# Patient Record
Sex: MALE | Race: WHITE | NOT HISPANIC OR LATINO | Employment: UNEMPLOYED | URBAN - METROPOLITAN AREA
[De-identification: names, ages, dates, MRNs, and addresses within clinical notes are randomized per-mention and may not be internally consistent; named-entity substitution may affect disease eponyms.]

---

## 2017-07-14 ENCOUNTER — RESOLUTE PROFESSIONAL BILLING HOSPITAL PROF FEE (OUTPATIENT)
Dept: HOSPITALIST | Facility: MEDICAL CENTER | Age: 32
End: 2017-07-14
Payer: MEDICAID

## 2017-07-14 ENCOUNTER — HOSPITAL ENCOUNTER (INPATIENT)
Facility: MEDICAL CENTER | Age: 32
LOS: 4 days | DRG: 638 | End: 2017-07-18
Attending: EMERGENCY MEDICINE | Admitting: HOSPITALIST
Payer: MEDICAID

## 2017-07-14 PROBLEM — E87.1 HYPONATREMIA: Status: ACTIVE | Noted: 2017-07-14

## 2017-07-14 PROBLEM — R73.9 HYPERGLYCEMIA: Status: ACTIVE | Noted: 2017-07-14

## 2017-07-14 PROBLEM — R59.9 LYMPH NODE ENLARGEMENT: Status: ACTIVE | Noted: 2017-07-14

## 2017-07-14 LAB
ALBUMIN SERPL BCP-MCNC: 3.8 G/DL (ref 3.2–4.9)
ALBUMIN/GLOB SERPL: 1.3 G/DL
ALP SERPL-CCNC: 126 U/L (ref 30–99)
ALT SERPL-CCNC: 20 U/L (ref 2–50)
ANION GAP SERPL CALC-SCNC: 11 MMOL/L (ref 0–11.9)
APPEARANCE UR: CLEAR
AST SERPL-CCNC: 15 U/L (ref 12–45)
B-OH-BUTYR SERPL-MCNC: 0.27 MMOL/L (ref 0.02–0.27)
BASOPHILS # BLD AUTO: 0.3 % (ref 0–1.8)
BASOPHILS # BLD: 0.02 K/UL (ref 0–0.12)
BILIRUB SERPL-MCNC: 0.6 MG/DL (ref 0.1–1.5)
BILIRUB UR QL STRIP.AUTO: NEGATIVE
BUN SERPL-MCNC: 8 MG/DL (ref 8–22)
CALCIUM SERPL-MCNC: 9.3 MG/DL (ref 8.5–10.5)
CHLORIDE SERPL-SCNC: 92 MMOL/L (ref 96–112)
CO2 SERPL-SCNC: 24 MMOL/L (ref 20–33)
COLOR UR: YELLOW
CREAT SERPL-MCNC: 0.71 MG/DL (ref 0.5–1.4)
CULTURE IF INDICATED INDCX: NO UA CULTURE
DEPRECATED S PYO AG THROAT QL EIA: NORMAL
EOSINOPHIL # BLD AUTO: 0.09 K/UL (ref 0–0.51)
EOSINOPHIL NFR BLD: 1.3 % (ref 0–6.9)
ERYTHROCYTE [DISTWIDTH] IN BLOOD BY AUTOMATED COUNT: 40.2 FL (ref 35.9–50)
EST. AVERAGE GLUCOSE BLD GHB EST-MCNC: 384 MG/DL
GFR SERPL CREATININE-BSD FRML MDRD: >60 ML/MIN/1.73 M 2
GLOBULIN SER CALC-MCNC: 3 G/DL (ref 1.9–3.5)
GLUCOSE BLD-MCNC: 472 MG/DL (ref 65–99)
GLUCOSE BLD-MCNC: 525 MG/DL (ref 65–99)
GLUCOSE BLD-MCNC: >600 MG/DL (ref 65–99)
GLUCOSE SERPL-MCNC: 682 MG/DL (ref 65–99)
GLUCOSE UR STRIP.AUTO-MCNC: >=1000 MG/DL
HBA1C MFR BLD: 15 % (ref 0–5.6)
HCT VFR BLD AUTO: 40 % (ref 42–52)
HETEROPH AB SER QL: NEGATIVE
HGB BLD-MCNC: 13.9 G/DL (ref 14–18)
IMM GRANULOCYTES # BLD AUTO: 0.03 K/UL (ref 0–0.11)
IMM GRANULOCYTES NFR BLD AUTO: 0.4 % (ref 0–0.9)
KETONES UR STRIP.AUTO-MCNC: NEGATIVE MG/DL
LEUKOCYTE ESTERASE UR QL STRIP.AUTO: NEGATIVE
LYMPHOCYTES # BLD AUTO: 1.54 K/UL (ref 1–4.8)
LYMPHOCYTES NFR BLD: 22.2 % (ref 22–41)
MCH RBC QN AUTO: 30.4 PG (ref 27–33)
MCHC RBC AUTO-ENTMCNC: 34.8 G/DL (ref 33.7–35.3)
MCV RBC AUTO: 87.5 FL (ref 81.4–97.8)
MICRO URNS: ABNORMAL
MONOCYTES # BLD AUTO: 0.55 K/UL (ref 0–0.85)
MONOCYTES NFR BLD AUTO: 7.9 % (ref 0–13.4)
NEUTROPHILS # BLD AUTO: 4.72 K/UL (ref 1.82–7.42)
NEUTROPHILS NFR BLD: 67.9 % (ref 44–72)
NITRITE UR QL STRIP.AUTO: NEGATIVE
NRBC # BLD AUTO: 0 K/UL
NRBC BLD AUTO-RTO: 0 /100 WBC
PH UR STRIP.AUTO: 8 [PH]
PLATELET # BLD AUTO: 227 K/UL (ref 164–446)
PMV BLD AUTO: 11.3 FL (ref 9–12.9)
POTASSIUM SERPL-SCNC: 4.4 MMOL/L (ref 3.6–5.5)
PROT SERPL-MCNC: 6.8 G/DL (ref 6–8.2)
PROT UR QL STRIP: NEGATIVE MG/DL
RBC # BLD AUTO: 4.57 M/UL (ref 4.7–6.1)
RBC UR QL AUTO: NEGATIVE
SIGNIFICANT IND 70042: NORMAL
SITE SITE: NORMAL
SODIUM SERPL-SCNC: 127 MMOL/L (ref 135–145)
SOURCE SOURCE: NORMAL
SP GR UR STRIP.AUTO: 1.04
UROBILINOGEN UR STRIP.AUTO-MCNC: 0.2 MG/DL
WBC # BLD AUTO: 7 K/UL (ref 4.8–10.8)

## 2017-07-14 PROCEDURE — 700102 HCHG RX REV CODE 250 W/ 637 OVERRIDE(OP): Performed by: HOSPITALIST

## 2017-07-14 PROCEDURE — 82010 KETONE BODYS QUAN: CPT

## 2017-07-14 PROCEDURE — 96372 THER/PROPH/DIAG INJ SC/IM: CPT

## 2017-07-14 PROCEDURE — 87081 CULTURE SCREEN ONLY: CPT

## 2017-07-14 PROCEDURE — 36415 COLL VENOUS BLD VENIPUNCTURE: CPT

## 2017-07-14 PROCEDURE — 700105 HCHG RX REV CODE 258: Performed by: EMERGENCY MEDICINE

## 2017-07-14 PROCEDURE — 86308 HETEROPHILE ANTIBODY SCREEN: CPT

## 2017-07-14 PROCEDURE — 99285 EMERGENCY DEPT VISIT HI MDM: CPT

## 2017-07-14 PROCEDURE — 99406 BEHAV CHNG SMOKING 3-10 MIN: CPT | Performed by: HOSPITALIST

## 2017-07-14 PROCEDURE — 96361 HYDRATE IV INFUSION ADD-ON: CPT

## 2017-07-14 PROCEDURE — 82962 GLUCOSE BLOOD TEST: CPT | Mod: 91

## 2017-07-14 PROCEDURE — 83036 HEMOGLOBIN GLYCOSYLATED A1C: CPT

## 2017-07-14 PROCEDURE — 85025 COMPLETE CBC W/AUTO DIFF WBC: CPT

## 2017-07-14 PROCEDURE — 770006 HCHG ROOM/CARE - MED/SURG/GYN SEMI*

## 2017-07-14 PROCEDURE — 99223 1ST HOSP IP/OBS HIGH 75: CPT | Mod: 25 | Performed by: HOSPITALIST

## 2017-07-14 PROCEDURE — 80053 COMPREHEN METABOLIC PANEL: CPT

## 2017-07-14 PROCEDURE — 96360 HYDRATION IV INFUSION INIT: CPT

## 2017-07-14 PROCEDURE — 700105 HCHG RX REV CODE 258: Performed by: HOSPITALIST

## 2017-07-14 PROCEDURE — 87880 STREP A ASSAY W/OPTIC: CPT

## 2017-07-14 PROCEDURE — 81003 URINALYSIS AUTO W/O SCOPE: CPT

## 2017-07-14 RX ORDER — HEPARIN SODIUM 5000 [USP'U]/ML
5000 INJECTION, SOLUTION INTRAVENOUS; SUBCUTANEOUS EVERY 8 HOURS
Status: DISCONTINUED | OUTPATIENT
Start: 2017-07-15 | End: 2017-07-18 | Stop reason: HOSPADM

## 2017-07-14 RX ORDER — DEXTROSE MONOHYDRATE 25 G/50ML
25 INJECTION, SOLUTION INTRAVENOUS
Status: DISCONTINUED | OUTPATIENT
Start: 2017-07-14 | End: 2017-07-18 | Stop reason: HOSPADM

## 2017-07-14 RX ORDER — ONDANSETRON 4 MG/1
4 TABLET, ORALLY DISINTEGRATING ORAL EVERY 4 HOURS PRN
Status: DISCONTINUED | OUTPATIENT
Start: 2017-07-14 | End: 2017-07-18 | Stop reason: HOSPADM

## 2017-07-14 RX ORDER — NICOTINE 21 MG/24HR
21 PATCH, TRANSDERMAL 24 HOURS TRANSDERMAL
Status: DISCONTINUED | OUTPATIENT
Start: 2017-07-15 | End: 2017-07-18 | Stop reason: HOSPADM

## 2017-07-14 RX ORDER — PROMETHAZINE HYDROCHLORIDE 25 MG/1
12.5-25 SUPPOSITORY RECTAL EVERY 4 HOURS PRN
Status: DISCONTINUED | OUTPATIENT
Start: 2017-07-14 | End: 2017-07-18 | Stop reason: HOSPADM

## 2017-07-14 RX ORDER — PROMETHAZINE HYDROCHLORIDE 25 MG/1
12.5-25 TABLET ORAL EVERY 4 HOURS PRN
Status: DISCONTINUED | OUTPATIENT
Start: 2017-07-14 | End: 2017-07-18 | Stop reason: HOSPADM

## 2017-07-14 RX ORDER — SODIUM CHLORIDE 9 MG/ML
1000 INJECTION, SOLUTION INTRAVENOUS ONCE
Status: COMPLETED | OUTPATIENT
Start: 2017-07-14 | End: 2017-07-14

## 2017-07-14 RX ORDER — AMOXICILLIN 250 MG
2 CAPSULE ORAL 2 TIMES DAILY
Status: DISCONTINUED | OUTPATIENT
Start: 2017-07-15 | End: 2017-07-18 | Stop reason: HOSPADM

## 2017-07-14 RX ORDER — SODIUM CHLORIDE 9 MG/ML
INJECTION, SOLUTION INTRAVENOUS CONTINUOUS
Status: DISCONTINUED | OUTPATIENT
Start: 2017-07-14 | End: 2017-07-15

## 2017-07-14 RX ORDER — ONDANSETRON 2 MG/ML
4 INJECTION INTRAMUSCULAR; INTRAVENOUS EVERY 4 HOURS PRN
Status: DISCONTINUED | OUTPATIENT
Start: 2017-07-14 | End: 2017-07-18 | Stop reason: HOSPADM

## 2017-07-14 RX ORDER — POLYETHYLENE GLYCOL 3350 17 G/17G
1 POWDER, FOR SOLUTION ORAL
Status: DISCONTINUED | OUTPATIENT
Start: 2017-07-14 | End: 2017-07-18 | Stop reason: HOSPADM

## 2017-07-14 RX ORDER — BISACODYL 10 MG
10 SUPPOSITORY, RECTAL RECTAL
Status: DISCONTINUED | OUTPATIENT
Start: 2017-07-14 | End: 2017-07-18 | Stop reason: HOSPADM

## 2017-07-14 RX ADMIN — INSULIN LISPRO 14 UNITS: 100 INJECTION, SOLUTION INTRAVENOUS; SUBCUTANEOUS at 21:07

## 2017-07-14 RX ADMIN — SODIUM CHLORIDE: 9 INJECTION, SOLUTION INTRAVENOUS at 20:46

## 2017-07-14 RX ADMIN — INSULIN HUMAN 10 UNITS: 100 INJECTION, SUSPENSION SUBCUTANEOUS at 18:42

## 2017-07-14 RX ADMIN — SODIUM CHLORIDE 1000 ML: 9 INJECTION, SOLUTION INTRAVENOUS at 17:45

## 2017-07-14 RX ADMIN — SODIUM CHLORIDE 1000 ML: 9 INJECTION, SOLUTION INTRAVENOUS at 17:10

## 2017-07-14 ASSESSMENT — PAIN SCALES - GENERAL
PAINLEVEL_OUTOF10: 0
PAINLEVEL_OUTOF10: 0

## 2017-07-14 ASSESSMENT — PATIENT HEALTH QUESTIONNAIRE - PHQ9
2. FEELING DOWN, DEPRESSED, IRRITABLE, OR HOPELESS: NOT AT ALL
SUM OF ALL RESPONSES TO PHQ QUESTIONS 1-9: 0
1. LITTLE INTEREST OR PLEASURE IN DOING THINGS: NOT AT ALL
SUM OF ALL RESPONSES TO PHQ9 QUESTIONS 1 AND 2: 0

## 2017-07-14 ASSESSMENT — LIFESTYLE VARIABLES: DO YOU DRINK ALCOHOL: NO

## 2017-07-14 NOTE — IP AVS SNAPSHOT
Novitas Access Code: [email or web address]  Expires: 8/17/2017  1:41 PM    Your email address is not on file at [facility].  Email Addresses are required for you to sign up for Novitas, please contact [phone] to verify your personal information and to provide your email address prior to attempting to register for Novitas.    Gerber Langston  [address] [place] [address] [address] [place] [id number]    Novitas  A secure, online tool to manage your health information     [facility]’s Novitas® is a secure, online tool that connects you to your personalized health information from the privacy of your home -- day or night - making it very easy for you to manage your healthcare. Once the activation process is completed, you can even access your medical information using the Novitas rosemary, which is available for free in the Apple Rosemary store or Google Play store.     To learn more about Novitas, visit [email or web address]    There are two levels of access available (as shown below):   My Chart Features  [facility] Primary Care Doctor [facility]  Specialists [facility]  Urgent  Care Non-[facility] Primary Care Doctor   Email your healthcare team securely and privately 24/7 X X X    Manage appointments: schedule your next appointment; view details of past/upcoming appointments X      Request prescription refills. X      View recent personal medical records, including lab and immunizations X X X X   View health record, including health history, allergies, medications X X X X   Read reports about your outpatient visits, procedures, consult and ER notes X X X X   See your discharge summary, which is a recap of your hospital and/or ER visit that includes your diagnosis, lab results, and care plan X X  X     How to register for WhatSalont:  Once your e-mail address has been verified, follow the following steps to sign up for WhatSalont.     1. Go to  [email or web address]  2. Click on the Sign Up Now box, which takes you to the New Member Sign Up page. You  will need to provide the following information:  a. Enter your JAZIO Access Code exactly as it appears at the top of this page. (You will not need to use this code after you’ve completed the sign-up process. If you do not sign up before the expiration date, you must request a new code.)   b. Enter your date of birth.   c. Enter your home email address.   d. Click Submit, and follow the next screen’s instructions.  3. Create a Caring.comt ID. This will be your JAZIO login ID and cannot be changed, so think of one that is secure and easy to remember.  4. Create a JAZIO password. You can change your password at any time.  5. Enter your Password Reset Question and Answer. This can be used at a later time if you forget your password.   6. Enter your e-mail address. This allows you to receive e-mail notifications when new information is available in JAZIO.  7. Click Sign Up. You can now view your health information.    For assistance activating your JAZIO account, call [phone]

## 2017-07-14 NOTE — ED PROVIDER NOTES
ED Provider Note    CHIEF COMPLAINT  Chief Complaint   Patient presents with   • Sore Throat       HPI  Gerber Langston is a 31 y.o. male who presents for evaluation of sore throat pain and a lymph node on his right neck. Patient also reports he has not been feeling well over the past several months with weight loss polydipsia polyphagia increased thirst. He has no stated or significant medical or surgical history. He reports mild sore throat but no muffled voice no high fevers or chills    REVIEW OF SYSTEMS  See HPI for further details. Positive for general malaise polydipsia polyuria All other systems are negative.     PAST MEDICAL HISTORY  History reviewed. No pertinent past medical history.  No stated medical history  FAMILY HISTORY  None reported    SOCIAL HISTORY  Social History     Social History   • Marital Status: Single     Spouse Name: N/A   • Number of Children: N/A   • Years of Education: N/A     Social History Main Topics   • Smoking status: Current Every Day Smoker -- 0.50 packs/day     Types: Cigarettes   • Smokeless tobacco: None   • Alcohol Use: Yes      Comment: socially   • Drug Use: No   • Sexual Activity: Not Asked     Other Topics Concern   • None     Social History Narrative     Smoke cigarettes denies IV drugs  SURGICAL HISTORY  Past Surgical History   Procedure Laterality Date   • Hernia repair Left 4/2016     inguinal       CURRENT MEDICATIONS  Home Medications     Reviewed by Anna Cortez, Pharmacy Int (Pharmacy Intern) on 07/14/17 at 1721  Med List Status: Complete    Medication Last Dose Status          Patient Ajay Taking any Medications                      No regular medications  ALLERGIES  No Known Allergies    PHYSICAL EXAM  VITAL SIGNS: /72 mmHg  Pulse 82  Temp(Src) 36.9 °C (98.4 °F)  Resp 16  Wt 56.2 kg (123 lb 14.4 oz)  SpO2 98% Room air O2: 98    Constitutional: Patient appears slightly cachectic   HEENT. Ear nose normal exam. Throat is erythematous no exudates  normal oral exudates, Nose normal. Dry mucous membranes  Eyes: PERRLA, EOMI, Conjunctiva normal, No discharge.   Neck: Normal range of motion, No tenderness, there appears to be a isolated right enlarged lymph node in the anterior cervical chain no abscess  Cardiovascular: Normal heart rate, Normal rhythm, No murmurs, No rubs, No gallops.   Thorax & Lungs: Normal breath sounds, No respiratory distress, No wheezing, No chest tenderness.   Abdomen: Bowel sounds normal, Soft, No tenderness, No masses, No pulsatile masses.   Skin: Warm, Dry, No erythema, No rash.   Back: No tenderness, No CVA tenderness.   Extremities: Intact distal pulses, No edema, No tenderness, No cyanosis, No clubbing.   Musculoskeletal: Good range of motion in all major joints. No tenderness to palpation or major deformities noted.   Neurologic: Alert & oriented x 3, Normal motor function, Normal sensory function, No focal deficits noted.   Psychiatric: Anxious      RADIOLOGY/PROCEDURES  Results for orders placed or performed during the hospital encounter of 07/14/17   CBC WITH DIFFERENTIAL   Result Value Ref Range    WBC 7.0 4.8 - 10.8 K/uL    RBC 4.57 (L) 4.70 - 6.10 M/uL    Hemoglobin 13.9 (L) 14.0 - 18.0 g/dL    Hematocrit 40.0 (L) 42.0 - 52.0 %    MCV 87.5 81.4 - 97.8 fL    MCH 30.4 27.0 - 33.0 pg    MCHC 34.8 33.7 - 35.3 g/dL    RDW 40.2 35.9 - 50.0 fL    Platelet Count 227 164 - 446 K/uL    MPV 11.3 9.0 - 12.9 fL    Neutrophils-Polys 67.90 44.00 - 72.00 %    Lymphocytes 22.20 22.00 - 41.00 %    Monocytes 7.90 0.00 - 13.40 %    Eosinophils 1.30 0.00 - 6.90 %    Basophils 0.30 0.00 - 1.80 %    Immature Granulocytes 0.40 0.00 - 0.90 %    Nucleated RBC 0.00 /100 WBC    Neutrophils (Absolute) 4.72 1.82 - 7.42 K/uL    Lymphs (Absolute) 1.54 1.00 - 4.80 K/uL    Monos (Absolute) 0.55 0.00 - 0.85 K/uL    Eos (Absolute) 0.09 0.00 - 0.51 K/uL    Baso (Absolute) 0.02 0.00 - 0.12 K/uL    Immature Granulocytes (abs) 0.03 0.00 - 0.11 K/uL    NRBC  (Absolute) 0.00 K/uL   COMP METABOLIC PANEL   Result Value Ref Range    Sodium 127 (L) 135 - 145 mmol/L    Potassium 4.4 3.6 - 5.5 mmol/L    Chloride 92 (L) 96 - 112 mmol/L    Co2 24 20 - 33 mmol/L    Anion Gap 11.0 0.0 - 11.9    Glucose 682 (HH) 65 - 99 mg/dL    Bun 8 8 - 22 mg/dL    Creatinine 0.71 0.50 - 1.40 mg/dL    Calcium 9.3 8.5 - 10.5 mg/dL    AST(SGOT) 15 12 - 45 U/L    ALT(SGPT) 20 2 - 50 U/L    Alkaline Phosphatase 126 (H) 30 - 99 U/L    Total Bilirubin 0.6 0.1 - 1.5 mg/dL    Albumin 3.8 3.2 - 4.9 g/dL    Total Protein 6.8 6.0 - 8.2 g/dL    Globulin 3.0 1.9 - 3.5 g/dL    A-G Ratio 1.3 g/dL   MONONUCLEOSIS TEST QUAL   Result Value Ref Range    Heterophile Screen Negative Negative   RAPID STREP, CULT IF INDICATED (CULTURE IF NEGATIVE)   Result Value Ref Range    Significant Indicator NEG     Source THRT     Site THROAT     Rapid Strep Screen       Negative for Group A streptococcus.  A negative result may be obtained if the specimen is  inadequate or antigen concentration is below the  sensitivity of the test. This negative test will be followed  up with a culture as requested.     ESTIMATED GFR   Result Value Ref Range    GFR If African American >60 >60 mL/min/1.73 m 2    GFR If Non African American >60 >60 mL/min/1.73 m 2   ACCU-CHEK GLUCOSE   Result Value Ref Range    Glucose - Accu-Ck >600 (HH) 65 - 99 mg/dL         COURSE & MEDICAL DECISION MAKING  Pertinent Labs & Imaging studies reviewed. (See chart for details)  Patient presented here with several constitutional symptoms as well as a enlarged lymph node isolated on the right side in the anterior cervical chain. I performed extensive laboratory studies and his rapid strep, Monospot were negative and his CBC was unremarkable however surprisingly his blood sugar was greater than 600 without evidence of acidosis. He has no evidence of hyperglycemia or diabetes. We rechecked this with an Accu-Chek to be sure that it was not a laboratory air and it was  reading critically high. Given the fact the patient is a new diabetic with profound hyperglycemia luckily he does not have any evidence of acidosis. I administered 2 L of crystalloid he'll be admitted to internal medicine for treatment and workup of new-onset diabetes    FINAL IMPRESSION  1. New onset diabetes without acidosis  2. Right anterior cervical chain lymphadenopathy    Admission         Electronically signed by: Sidney Friedman, 7/14/2017 3:17 PM

## 2017-07-14 NOTE — IP AVS SNAPSHOT
" <p align=\"LEFT\"><IMG SRC=\"//[email or web address]\" alt=\"Image\" WIDTH=\"50%\" HEIGHT=\"200\" BORDER=\"\"></p>                   Name:Gerber Langston  Medical Record Number:[id number]  CSN: [id number]    YOB: 1985   Age: 31 y.o.  Sex: male  HT:1.626 m (5' 4\") WT: 58.9 kg (129 lb 13.6 oz)          Admit Date: 7/14/2017     Discharge Date:   Today's Date: 7/18/2017  Attending Doctor:  Bashir Miner M.D.                  Allergies:  Review of patient's allergies indicates no known allergies.          Your appointments     Aug 02, 2017 10:00 AM   New Patient with Donald Weber M.D.   [facility] Med - Internal Medicine (--)    [address] [place] [id number] [phone]           Please bring Photo ID, Insurance Cards, All Medication Bottles and copies of any legal documents (such as Living Will, Power of ) If speaking a language besides English please bring an adult . Please arrive 30 minutes prior for check in and registration. You will be receiving a confirmation call a few days before your appointment from our automated call confirmation system.                 Medication List      Take these Medications        Instructions    insulin glargine 100 UNIT/ML Soln   Commonly known as:  LANTUS    Inject 30 Units as instructed every evening.   Dose:  30 Units       insulin lispro 100 UNIT/ML Soln   Commonly known as:  HUMALOG    Inject 3-14 Units as instructed 4 Times a Day,Before Meals and at Bedtime.   Dose:  3-14 Units         "

## 2017-07-14 NOTE — ED NOTES
Jesse from Lab called with critical result of Glucose of 682 at 1653. Critical lab result read back to Jesse.   Dr. Gamez notified of critical lab result at 1653.  Critical lab result read back by Dr. Chandler.

## 2017-07-14 NOTE — IP AVS SNAPSHOT
7/18/2017    Gerber Langston  [address]  [place] [address]    Dear Gerber:    [facility] wants to ensure your discharge home is safe and you or your loved ones have had all of your questions answered regarding your care after you leave the hospital.    Below is a list of resources and contact information should you have any questions regarding your hospital stay, follow-up instructions, or active medical symptoms.    Questions or Concerns Regarding… Contact   Medical Questions Related to Your Discharge  (7 days a week, 8am-5pm) Contact a Nurse Care Coordinator   [phone]   Medical Questions Not Related to Your Discharge  (24 hours a day / 7 days a week)  Contact the Nurse Health Line   [phone]    Medications or Discharge Instructions Refer to your discharge packet   or contact your [facility] Primary Care Provider   [phone]   Follow-up Appointment(s) Schedule your appointment via [employer]   or contact Scheduling [phone]   Billing Review your statement via [employer]  or contact Billing [phone]   Medical Records Review your records via [employer]   or contact Medical Records [phone]     You may receive a telephone call within two days of discharge. This call is to make certain you understand your discharge instructions and have the opportunity to have any questions answered. You can also easily access your medical information, test results and upcoming appointments via the [employer] free online health management tool. You can learn more and sign up at [employer]. For assistance setting up your [employer] account, please call [phone].    Once again, we want to ensure your discharge home is safe and that you have a clear understanding of any next steps in your care. If you have any questions or concerns, please do not hesitate to contact us, we are here for you. Thank you for choosing [facility] for your healthcare needs.    Sincerely,    Your [facility] Healthcare Team

## 2017-07-14 NOTE — IP AVS SNAPSHOT
" Home Care Instructions                                                                                                                  Name:Gerber Langston  Medical Record Number:[id number]  CSN: [id number]    YOB: 1985   Age: 31 y.o.  Sex: male  HT:1.626 m (5' 4\") WT: 58.9 kg (129 lb 13.6 oz)          Admit Date: 7/14/2017     Discharge Date:   Today's Date: 7/18/2017  Attending Doctor:  Bashir Miner M.D.                  Allergies:  Review of patient's allergies indicates no known allergies.            Discharge Instructions       Discharge Instructions    Discharged to home by car with friend. Discharged via walking, hospital escort: Refused.  Special equipment needed: Not Applicable    Be sure to schedule a follow-up appointment with your primary care doctor or any specialists as instructed.     Discharge Plan:   Diet Plan: Discussed  Activity Level: Discussed  Confirmed Follow up Appointment: Appointment Scheduled  Confirmed Symptoms Management: Discussed  Medication Reconciliation Updated: Yes  Pneumococcal Vaccine Given - only chart on this line when given: Given (See MAR)  Influenza Vaccine Indication: Indicated: Not available from distributor/    I understand that a diet low in cholesterol, fat, and sodium is recommended for good health. Unless I have been given specific instructions below for another diet, I accept this instruction as my diet prescription.   Other diet: diabetic    Special Instructions: None    · Is patient discharged on Warfarin / Coumadin?   No     · Is patient Post Blood Transfusion?  No    Depression / Suicide Risk    As you are discharged from [facility], it is important to learn how to keep safe from harming yourself.    Recognize the warning signs:  · Abrupt changes in personality, positive or negative- including increase in energy   · Giving away possessions  · Change in eating patterns- significant weight changes-  positive or negative  · Change in " sleeping patterns- unable to sleep or sleeping all the time   · Unwillingness or inability to communicate  · Depression  · Unusual sadness, discouragement and loneliness  · Talk of wanting to die  · Neglect of personal appearance   · Rebelliousness- reckless behavior  · Withdrawal from people/activities they love  · Confusion- inability to concentrate     If you or a loved one observes any of these behaviors or has concerns about self-harm, here's what you can do:  · Talk about it- your feelings and reasons for harming yourself  · Remove any means that you might use to hurt yourself (examples: pills, rope, extension cords, firearm)  · Get professional help from the community (Mental Health, Substance Abuse, psychological counseling)  · Do not be alone:Call your Safe Contact- someone whom you trust who will be there for you.  · Call your local CRISIS HOTLINE 588-3116 or 606-244-2985  · Call your local Children's Mobile Crisis Response Team [place] (612) 209-8516 or www.Cmxtwenty  · Call the toll free National Suicide Prevention Hotlines   · National Suicide Prevention Lifeline 157-021-PUKZ (1240)  · National Hope Line Network 800-SUICIDE (703-5237)        Your appointments     Aug 02, 2017 10:00 AM   New Patient with Donald Weber M.D.   [facility] - Internal Medicine (--)    [address] [place] [id number] [phone]           Please bring Photo ID, Insurance Cards, All Medication Bottles and copies of any legal documents (such as Living Will, Power of ) If speaking a language besides English please bring an adult . Please arrive 30 minutes prior for check in and registration. You will be receiving a confirmation call a few days before your appointment from our automated call confirmation system.                 Discharge Medication Instructions:    Below are the medications your physician expects you to take upon discharge:    Review all  your home medications and newly ordered medications with your doctor and/or pharmacist. Follow medication instructions as directed by your doctor and/or pharmacist.    Please keep your medication list with you and share with your physician.               Medication List      START taking these medications        Instructions    Morning Afternoon Evening Bedtime    insulin glargine 100 UNIT/ML Soln   Last time this was given:  30 Units on 7/17/2017  9:40 PM   Commonly known as:  LANTUS        Inject 30 Units as instructed every evening.   Dose:  30 Units                        insulin lispro 100 UNIT/ML Soln   Last time this was given:  7 Units on 7/18/2017 11:28 AM   Commonly known as:  HUMALOG        Inject 3-14 Units as instructed 4 Times a Day,Before Meals and at Bedtime.   Dose:  3-14 Units                             Where to Get Your Medications      You can get these medications from any pharmacy     Bring a paper prescription for each of these medications    - insulin glargine 100 UNIT/ML Soln  - insulin lispro 100 UNIT/ML Soln            Instructions           Diet / Nutrition:    Follow any diet instructions given to you by your doctor or the dietician, including how much salt (sodium) you are allowed each day.    If you are overweight, talk to your doctor about a weight reduction plan.    Activity:    Remain physically active following your doctor's instructions about exercise and activity.    Rest often.     Any time you become even a little tired or short of breath, SIT DOWN and rest.    Worsening Symptoms:    Report any of the following signs and symptoms to the doctor's office immediately:    *Pain of jaw, arm, or neck  *Chest pain not relieved by medication                               *Dizziness or loss of consciousness  *Difficulty breathing even when at rest   *More tired than usual                                       *Bleeding drainage or swelling of surgical site  *Swelling of feet, ankles,  legs or stomach                 *Fever (>100ºF)  *Pink or blood tinged sputum  *Weight gain (3lbs/day or 5lbs /week)           *Shock from internal defibrillator (if applicable)  *Palpitations or irregular heartbeats                *Cool and/or numb extremities    Stroke Awareness    Common Risk Factors for Stroke include:    Age  Atrial Fibrillation  Carotid Artery Stenosis  Diabetes Mellitus  Excessive alcohol consumption  High blood pressure  Overweight   Physical inactivity  Smoking    Warning signs and symptoms of a stroke include:    *Sudden numbness or weakness of the face, arm or leg (especially on one side of the body).  *Sudden confusion, trouble speaking or understanding.  *Sudden trouble seeing in one or both eyes.  *Sudden trouble walking, dizziness, loss of balance or coordination.Sudden severe headache with no known cause.    It is very important to get treatment quickly when a stroke occurs. If you experience any of the above warning signs, call 911 immediately.                   Disclaimer         Quit Smoking / Tobacco Use:    I understand the use of any tobacco products increases my chance of suffering from future heart disease or stroke and could cause other illnesses which may shorten my life. Quitting the use of tobacco products is the single most important thing I can do to improve my health. For further information on smoking / tobacco cessation call a Toll Free Quit Line at 1-257.439.6395 (*National [employer] [place]) or 1-279.140.1247 (American Lung Association) or you can access the web based program at www.lungusa.org.    [place] Tobacco Users Help Line:  (602) 799-9816       Toll Free: 1-585.758.4089  Quit Tobacco Program [facility] (590)666-3139    Crisis Hotline:    [place] Crisis Hotline:  5-206-NFFKCAJ or 1-854.107.6476    [place] Crisis Hotline:    1-207.728.9656 or 235-859-2946    Discharge Survey:   Thank you for choosing [email or web address]. We hope we did  everything we could to make your hospital stay a pleasant one. You may be receiving a phone survey and we would appreciate your time and participation in answering the questions. Your input is very valuable to us in our efforts to improve our service to our patients and their families.        My signature on this form indicates that:    1. I have reviewed and understand the above information.  2. My questions regarding this information have been answered to my satisfaction.  3. I have formulated a plan with my discharge nurse to obtain my prescribed medications for home.                  Disclaimer         __________________________________                     __________       ________                       Patient Signature                                                 Date                    Time

## 2017-07-15 PROBLEM — E10.9 TYPE 1 DIABETES MELLITUS WITHOUT COMPLICATION (HCC): Status: ACTIVE | Noted: 2017-07-15

## 2017-07-15 LAB
ANION GAP SERPL CALC-SCNC: 3 MMOL/L (ref 0–11.9)
BUN SERPL-MCNC: 9 MG/DL (ref 8–22)
CALCIUM SERPL-MCNC: 8.6 MG/DL (ref 8.5–10.5)
CHLORIDE SERPL-SCNC: 106 MMOL/L (ref 96–112)
CO2 SERPL-SCNC: 25 MMOL/L (ref 20–33)
CREAT SERPL-MCNC: 0.57 MG/DL (ref 0.5–1.4)
GFR SERPL CREATININE-BSD FRML MDRD: >60 ML/MIN/1.73 M 2
GLUCOSE BLD-MCNC: 149 MG/DL (ref 65–99)
GLUCOSE BLD-MCNC: 200 MG/DL (ref 65–99)
GLUCOSE BLD-MCNC: 209 MG/DL (ref 65–99)
GLUCOSE BLD-MCNC: 305 MG/DL (ref 65–99)
GLUCOSE BLD-MCNC: 471 MG/DL (ref 65–99)
GLUCOSE SERPL-MCNC: 190 MG/DL (ref 65–99)
POTASSIUM SERPL-SCNC: 3.5 MMOL/L (ref 3.6–5.5)
SODIUM SERPL-SCNC: 134 MMOL/L (ref 135–145)

## 2017-07-15 PROCEDURE — 90732 PPSV23 VACC 2 YRS+ SUBQ/IM: CPT | Performed by: HOSPITALIST

## 2017-07-15 PROCEDURE — 80048 BASIC METABOLIC PNL TOTAL CA: CPT

## 2017-07-15 PROCEDURE — 700102 HCHG RX REV CODE 250 W/ 637 OVERRIDE(OP): Performed by: HOSPITALIST

## 2017-07-15 PROCEDURE — 700105 HCHG RX REV CODE 258: Performed by: HOSPITALIST

## 2017-07-15 PROCEDURE — 90471 IMMUNIZATION ADMIN: CPT

## 2017-07-15 PROCEDURE — 700102 HCHG RX REV CODE 250 W/ 637 OVERRIDE(OP): Performed by: INTERNAL MEDICINE

## 2017-07-15 PROCEDURE — 82962 GLUCOSE BLOOD TEST: CPT | Mod: 91

## 2017-07-15 PROCEDURE — A9270 NON-COVERED ITEM OR SERVICE: HCPCS | Performed by: INTERNAL MEDICINE

## 2017-07-15 PROCEDURE — 3E0234Z INTRODUCTION OF SERUM, TOXOID AND VACCINE INTO MUSCLE, PERCUTANEOUS APPROACH: ICD-10-PCS | Performed by: HOSPITALIST

## 2017-07-15 PROCEDURE — 700111 HCHG RX REV CODE 636 W/ 250 OVERRIDE (IP): Performed by: HOSPITALIST

## 2017-07-15 PROCEDURE — 770006 HCHG ROOM/CARE - MED/SURG/GYN SEMI*

## 2017-07-15 PROCEDURE — 99233 SBSQ HOSP IP/OBS HIGH 50: CPT | Performed by: INTERNAL MEDICINE

## 2017-07-15 PROCEDURE — A9270 NON-COVERED ITEM OR SERVICE: HCPCS | Performed by: HOSPITALIST

## 2017-07-15 PROCEDURE — 36415 COLL VENOUS BLD VENIPUNCTURE: CPT

## 2017-07-15 RX ORDER — POTASSIUM CHLORIDE 20 MEQ/1
40 TABLET, EXTENDED RELEASE ORAL ONCE
Status: COMPLETED | OUTPATIENT
Start: 2017-07-15 | End: 2017-07-15

## 2017-07-15 RX ADMIN — INSULIN LISPRO 10 UNITS: 100 INJECTION, SOLUTION INTRAVENOUS; SUBCUTANEOUS at 21:54

## 2017-07-15 RX ADMIN — STANDARDIZED SENNA CONCENTRATE AND DOCUSATE SODIUM 2 TABLET: 8.6; 5 TABLET, FILM COATED ORAL at 21:05

## 2017-07-15 RX ADMIN — INSULIN LISPRO 14 UNITS: 100 INJECTION, SOLUTION INTRAVENOUS; SUBCUTANEOUS at 17:54

## 2017-07-15 RX ADMIN — NICOTINE 21 MG: 21 PATCH, EXTENDED RELEASE TRANSDERMAL at 05:50

## 2017-07-15 RX ADMIN — INSULIN HUMAN 10 UNITS: 100 INJECTION, SUSPENSION SUBCUTANEOUS at 17:55

## 2017-07-15 RX ADMIN — PNEUMOCOCCAL VACCINE POLYVALENT 25 MCG
25; 25; 25; 25; 25; 25; 25; 25; 25; 25; 25; 25; 25; 25; 25; 25; 25; 25; 25; 25; 25; 25; 25 INJECTION, SOLUTION INTRAMUSCULAR; SUBCUTANEOUS at 05:50

## 2017-07-15 RX ADMIN — POTASSIUM CHLORIDE 40 MEQ: 1500 TABLET, EXTENDED RELEASE ORAL at 09:11

## 2017-07-15 RX ADMIN — INSULIN LISPRO 4 UNITS: 100 INJECTION, SOLUTION INTRAVENOUS; SUBCUTANEOUS at 06:32

## 2017-07-15 RX ADMIN — HEPARIN SODIUM 5000 UNITS: 5000 INJECTION, SOLUTION INTRAVENOUS; SUBCUTANEOUS at 21:49

## 2017-07-15 RX ADMIN — SODIUM CHLORIDE: 9 INJECTION, SOLUTION INTRAVENOUS at 12:35

## 2017-07-15 RX ADMIN — INSULIN HUMAN 10 UNITS: 100 INJECTION, SUSPENSION SUBCUTANEOUS at 06:45

## 2017-07-15 ASSESSMENT — ENCOUNTER SYMPTOMS
DEPRESSION: 0
EYE PAIN: 0
DIARRHEA: 0
ABDOMINAL PAIN: 0
NAUSEA: 0
HEADACHES: 0
SEIZURES: 0
SPUTUM PRODUCTION: 0
SPEECH CHANGE: 0
SHORTNESS OF BREATH: 0
FALLS: 0
WEIGHT LOSS: 0
CONSTIPATION: 0
HEARTBURN: 0
TREMORS: 0
BLURRED VISION: 0
BACK PAIN: 0
WEAKNESS: 0
NECK PAIN: 0
CHILLS: 0
VOMITING: 0
PALPITATIONS: 0
DIZZINESS: 0
COUGH: 0
FEVER: 0
PND: 0
WHEEZING: 0

## 2017-07-15 ASSESSMENT — PAIN SCALES - GENERAL
PAINLEVEL_OUTOF10: 0

## 2017-07-15 ASSESSMENT — LIFESTYLE VARIABLES: SUBSTANCE_ABUSE: 0

## 2017-07-15 NOTE — CARE PLAN
Problem: Safety  Goal: Will remain free from falls  Intervention: Assess risk factors for falls    07/14/17 2034 07/15/17 0000   OTHER   Fall Risk Risk to Fall - 0 - 1 point --    Mobility Status Assessment 0-Ambulates & Transfers Independently. No Assistance Required --    History of fall 0 --    Pt Calls for Assistance --  No assistance required       Intervention: Implement fall precautions    07/14/17 2034 07/15/17 0000   OTHER   Environmental Precautions --  Treaded Slipper Socks on Patient;Report Given to Other Health Care Providers Regarding Fall Risk;Communication Sign for Patients & Families   IV Pole on Same Side of Bed as Bathroom Yes --    Bedrails Bedrails Closest to Bathroom Down --    Chair/Bed Strip Alarm Patient Educated Regarding Fall Risk and Need for Bed Alarm, Understands and Continues to Refuse --            Problem: Venous Thromboembolism (VTW)/Deep Vein Thrombosis (DVT) Prevention:  Goal: Patient will participate in Venous Thrombosis (VTE)/Deep Vein Thrombosis (DVT)Prevention Measures  Outcome: PROGRESSING AS EXPECTED    07/14/17 2034   Mechanical/VTE Prophylaxis   Mechanical Prophylaxis  (Ambulates frequently)   OTHER   Risk Assessment Score 0   VTE RISK Low   Pharmacologic Prophylaxis Used (Not ordered yet, will endorse)

## 2017-07-15 NOTE — PROGRESS NOTES
Assumed care of pt. This morning, A&Ox4, pt. Laying in bed, friend at bedside. Intact PIV running on NS at 125ml/hr infusing well. Complaints of body malaise, pain still unchange on R side under the jaw. Reinforced education about diet. Due med given this morning, pt. Ambulates steadily to the bathroom, other fall prec in place like treaded socks and room close to nurses station. Needs attended.

## 2017-07-15 NOTE — ASSESSMENT & PLAN NOTE
Newly Diagnosed, insulin antibodies ordered  Hgb 15  BS still not controlled  NPH changed to lantus  Diabetic educator has seen patient today and teaching how to use sliding scale at home

## 2017-07-15 NOTE — PROGRESS NOTES
Pt arrived in unit at around 2030. Transported via gurney. Pt is ambulatory, steady gait. Assessed to be alert and oriented x4, pleasant and cooperative with care. Per report, pt is a newly diagnosed diabetic. Noted blood sugar over 700 down in ER. Checked BS upon arrival in unit, noted to be 525mg/dl. Covered with 14units humalog per sliding scale. Rechecked after 1hr, continued to be elevated at 472mg/dl. Notified hospitalist JÚNIOR Gipson Will recheck at 12mn per request. Will notify if greater than 400. Pt has polyphagia, polydipsia. Advised that this is a common symptom for diabetes. Reports that he has been exhibiting these symptoms for a couple of weeks now and he lost about 30-40lbs in the past 3 months. Pt is calm in bed, denies nausea, no dizziness. Reports to have some weakness but manages to ambulate independently. Started IV fluids. Denies taking any steroids. Enforced diet restrictions and educated about diet order. Pt agreeable to care plan. Completed admit profile, denies taking any prescription meds at home. Kept safe and comfortable and provided needs.

## 2017-07-15 NOTE — H&P
"PATIENT ID:  NAME:  Gerber Langston  MRN:               [id number]  YOB: 1985    PMD: Pcp Pt States None    CC: Right neck lump    HPI: Gerber Langston is a 31 y.o. male who presents with right neck lump. Patient reports that he has noticed a small lump in his right neck/lower jaw area. This is been present for about a week. He denies being sick in the recent past. The lump became concerning so he finally decided to come the hospital for evaluation. Where he is found to have blood glucose levels of greater than 600. He denies ever having had a history of diabetes. He has a history of hepatitis C. He has no significant medical conditions. He has no other complaints except for polydipsia and polyuria.                REVIEW OF SYSTEMS  A full review of systems was completed and all pertinent positives and negatives are included in the HPI above by AMA/CMS criteria.    PAST MEDICAL HISTORY  History reviewed. No pertinent past medical history.    PAST SURGICAL HISTORY  Past Surgical History   Procedure Laterality Date   • Hernia repair Left 4/2016     inguinal       FAMILY HISTORY  No family history on file.    SOCIAL HISTORY  Social History   Substance Use Topics   • Smoking status: Current Every Day Smoker -- 0.50 packs/day     Types: Cigarettes   • Smokeless tobacco: Not on file   • Alcohol Use: Yes      Comment: socially       ALLERGIES  Allergies as of 07/14/2017   • (No Known Allergies)       OUTPATIENT MEDICATIONS     Medication List      Notice     You have not been prescribed any medications.          PHYSICAL EXAM:  Blood pressure 117/80, pulse 77, temperature 36.5 °C (97.7 °F), resp. rate 18, height 1.626 m (5' 4\"), weight 58.9 kg (129 lb 13.6 oz), SpO2 97 %.  Oxygen: Pulse Oximetry: 97 %, O2 (LPM): 0, O2 Delivery: None (Room Air)    Gen: NAD, comfortable  HEENT: NCAT, EOMI, right submandibular lymphadenopathy, tender to touch, very poor dentition, no JVD, neck supple, dry mucous membranes, no " supraclavicular lymphadenopathy  Heart: +S1/S2, RRR, no murmur, rubs or gallops appreciated  Lungs: CTAB, no accessory muscle use, no wheezes, rubs or rhonchi heard  GI: NTND, soft, +BS, no rebound/guarding, no hepatosplenomegaly  Extremities: Warm, well-perfused, no cyanosis/clubbing, no peripheral edema, distal pulses are intact  MSK: 5/5 strength in all 4 extremities, sensation intact to light touch  Neuro: AO x 3, CN II-XII grossly intact  Skin: Multiple tattoos on his face and elsewhere    LAB TESTS and IMAGES:   Recent Labs      07/14/17   1535   WBC  7.0   RBC  4.57*   HEMOGLOBIN  13.9*   HEMATOCRIT  40.0*   MCV  87.5   MCH  30.4   RDW  40.2   PLATELETCT  227   MPV  11.3   NEUTSPOLYS  67.90   LYMPHOCYTES  22.20   MONOCYTES  7.90   EOSINOPHILS  1.30   BASOPHILS  0.30             Recent Labs      07/14/17   1535   SODIUM  127*   POTASSIUM  4.4   CHLORIDE  92*   CO2  24   BUN  8   CREATININE  0.71   CALCIUM  9.3   ALBUMIN  3.8     Estimated GFR/CRCL = Estimated Creatinine Clearance: 127.9 mL/min (by C-G formula based on Cr of 0.71).  Recent Labs      07/14/17   1535  07/14/17   1700  07/14/17   2043  07/14/17   2213   GLUCOSE  682*   --    --    --    POCGLUCOSE   --   >600*  525*  472*     GLYCOHEMOGLOBIN   Date/Time Value Ref Range Status   07/14/2017 03:35 PM 15.0* 0.0 - 5.6 % Final     Comment:     Increased risk for diabetes:  5.7 -6.4%  Diabetes:  >6.4%  Glycemic control for adults with diabetes:  <7.0%  The above interpretations are per ADA guidelines.  Diagnosis  of diabetes mellitus on the basis of elevated Hemoglobin A1c  should be confirmed by repeating the Hb A1c test.       Recent Labs      07/14/17   1535   ASTSGOT  15   ALTSGPT  20   TBILIRUBIN  0.6   ALKPHOSPHAT  126*   GLOBULIN  3.0           Invalid input(s): [id number]  No results found for: GBNYLDOP77, FOLATE, FERRITIN, IRON, TOTIRONBC  No results found.    ASSESSMENT/PLAN: Gerber Langston is a 31 y.o. male who presents with right  submandibular lump    1. Hyperglycemia  - Newly diagnosed diabetes  - Check a hemoglobin A1c  - Aggressive sliding-scale insulin  - IV fluid  - NPH 10 units twice a day    2. Hepatitis C    3. Ongoing tobacco dependence  - Counseled him regarding tobacco cessation for approximately 3 minutes  - Nicotine patch    4. Right submandibular lymphadenopathy  - Etiology is not clear  - No lymphadenopathy elsewhere  - No sign of active infection    5. Hypochloremic hyponatremia  - Consistent with poor control diabetes    PPX: Sequential compression devices for DVT prophylaxis, no PPI indicated, stool softeners ordered    CODE STATUS: Full    Anticipate that patient will need more than 2 midnights for management of the above discussed medical issues.    This dictation was created using voice recognition software. The accuracy of the dictation is limited to the abilities of the software. I expect there may be some errors of grammar and possibly content.

## 2017-07-15 NOTE — CARE PLAN
Problem: Safety  Goal: Will remain free from falls  Outcome: PROGRESSING AS EXPECTED  Pt. Is upself able to get up to the bathroom despite weakness, not getting any pain meds.     Problem: Knowledge Deficit  Goal: Knowledge of disease process/condition, treatment plan, diagnostic tests, and medications will improve  Outcome: PROGRESSING AS EXPECTED  Newly diagnosed Diabetic, pending diabetes education and nutrition consult.  Educated on diet regimen.     Problem: Fluid Volume:  Goal: Will maintain balanced intake and output  Outcome: PROGRESSING AS EXPECTED  Getting NS fluid 125ml/hr, voiding freely.

## 2017-07-15 NOTE — CARE PLAN
Problem: Nutritional:  Goal: Patient to verbalize or demonstrate understanding of diet  Outcome: NOT MET  Pt asleep at this time, DM education handouts left at bedside, RD will follow up and provide education

## 2017-07-15 NOTE — PROGRESS NOTES
2-RN skin check done. Assessed to have fair skin condition, intact, no open areas, no brusing, skin tears, rashes. Pt has old scar from an old inguinal hernia to left inguinal area. Appropriate skin protocols initiated.

## 2017-07-15 NOTE — PROGRESS NOTES
[facility] Hospitalist Progress Note    Date of Service: 7/15/2017    Chief Complaint  31 y.o. male admitted 2017 with DM type 1 newly diagnosed.    Interval Problem Update  7/15 patient has been stable overnight, no significant chief complaint. No acute event. Blood sugar better controlled with insulin. Patient otherwise denies fever, chills, nausea, vomiting, adb pain, SOB, CP, headache, constipation, diarrhea, cough, or sputum.    Consultants/Specialty  none    Disposition  Home        Review of Systems   Constitutional: Negative for fever, chills and weight loss.   HENT: Negative for congestion, ear discharge, ear pain, hearing loss and nosebleeds.    Eyes: Negative for blurred vision and pain.   Respiratory: Negative for cough, sputum production, shortness of breath and wheezing.    Cardiovascular: Negative for chest pain, palpitations, leg swelling and PND.   Gastrointestinal: Negative for heartburn, nausea, vomiting, abdominal pain, diarrhea and constipation.   Genitourinary: Negative for dysuria, frequency and hematuria.   Musculoskeletal: Negative for back pain, joint pain, falls and neck pain.   Skin: Negative for rash.   Neurological: Negative for dizziness, tremors, speech change, seizures, weakness and headaches.   Psychiatric/Behavioral: Negative for depression, suicidal ideas and substance abuse.      Physical Exam  Laboratory/Imaging   Hemodynamics  Temp (24hrs), Av.7 °C (98.1 °F), Min:36.5 °C (97.7 °F), Max:36.9 °C (98.4 °F)   Temperature: 36.8 °C (98.2 °F)  Pulse  Av.3  Min: 74  Max: 99    Blood Pressure: 105/72 mmHg, NIBP: 122/80 mmHg      Respiratory      Respiration: 18, Pulse Oximetry: 99 %             Fluids    Intake/Output Summary (Last 24 hours) at 07/15/17 0801  Last data filed at 07/15/17 0600   Gross per 24 hour   Intake    900 ml   Output      0 ml   Net    900 ml       Nutrition  Orders Placed This Encounter   Procedures   • Diet Order     Standing Status: Standing       Number of Occurrences: 1      Standing Expiration Date:      Order Specific Question:  Diet:     Answer:  Diabetic [3]     Physical Exam   Constitutional: He is oriented to person, place, and time. He appears well-developed and well-nourished.   HENT:   Head: Normocephalic.   Eyes: EOM are normal. Pupils are equal, round, and reactive to light.   Neck: Normal range of motion. Neck supple. No JVD present. No thyromegaly present.   Cardiovascular: Normal rate, regular rhythm and normal heart sounds.  Exam reveals no gallop and no friction rub.    No murmur heard.  Pulmonary/Chest: Effort normal and breath sounds normal. No respiratory distress. He has no wheezes. He has no rales. He exhibits no tenderness.   Abdominal: Soft. Bowel sounds are normal. He exhibits no distension and no mass. There is no tenderness. There is no rebound and no guarding.   Musculoskeletal: Normal range of motion. He exhibits no edema.   Lymphadenopathy:     He has cervical adenopathy (mild small).   Neurological: He is alert and oriented to person, place, and time. He displays normal reflexes. No cranial nerve deficit.   Skin: Skin is dry. No rash noted.   Psychiatric: He has a normal mood and affect.   Nursing note and vitals reviewed.      Recent Labs      07/14/17   1535   WBC  7.0   RBC  4.57*   HEMOGLOBIN  13.9*   HEMATOCRIT  40.0*   MCV  87.5   MCH  30.4   MCHC  34.8   RDW  40.2   PLATELETCT  227   MPV  11.3     Recent Labs      07/14/17   1535  07/15/17   0216   SODIUM  127*  134*   POTASSIUM  4.4  3.5*   CHLORIDE  92*  106   CO2  24  25   GLUCOSE  682*  190*   BUN  8  9   CREATININE  0.71  0.57   CALCIUM  9.3  8.6                      Assessment/Plan     * Type 1 diabetes mellitus without complication (CMS-HCC)  Assessment & Plan  Newly Diagnosed  Hgb 15  Need insulin and diabetes education.  Patient is willing to do self insulin injection.    Hyponatremia  Assessment & Plan  sudohyponatremia  Due to high BS  Resolved      Lymph node  enlargement  Assessment & Plan  Reactive  No signs of infection      Labs reviewed, Radiology images reviewed and Medications reviewed  Cordero catheter: No Cordero        DVT prophylaxis - mechanical: SCDs  Ulcer prophylaxis: Not indicated    Assessed for rehab: Patient returned to prior level of function, rehabilitation not indicated at this time     For complexity-based billing, please refer to the history, exam, and decison making above. In addition, I spent 35 minutes caring for the patient today. More than 50% of the time was spent counseling and coordinating care.    I have discussed with RN and ERROL and SW and other consultants about patient's plan.

## 2017-07-16 LAB
ANION GAP SERPL CALC-SCNC: 7 MMOL/L (ref 0–11.9)
BUN SERPL-MCNC: 10 MG/DL (ref 8–22)
CALCIUM SERPL-MCNC: 8.5 MG/DL (ref 8.5–10.5)
CHLORIDE SERPL-SCNC: 106 MMOL/L (ref 96–112)
CO2 SERPL-SCNC: 25 MMOL/L (ref 20–33)
CREAT SERPL-MCNC: 0.53 MG/DL (ref 0.5–1.4)
GFR SERPL CREATININE-BSD FRML MDRD: >60 ML/MIN/1.73 M 2
GLUCOSE BLD-MCNC: 276 MG/DL (ref 65–99)
GLUCOSE BLD-MCNC: 289 MG/DL (ref 65–99)
GLUCOSE BLD-MCNC: 298 MG/DL (ref 65–99)
GLUCOSE BLD-MCNC: 360 MG/DL (ref 65–99)
GLUCOSE SERPL-MCNC: 175 MG/DL (ref 65–99)
POTASSIUM SERPL-SCNC: 3.6 MMOL/L (ref 3.6–5.5)
S PYO SPEC QL CULT: NORMAL
SIGNIFICANT IND 70042: NORMAL
SITE SITE: NORMAL
SODIUM SERPL-SCNC: 138 MMOL/L (ref 135–145)
SOURCE SOURCE: NORMAL

## 2017-07-16 PROCEDURE — 770006 HCHG ROOM/CARE - MED/SURG/GYN SEMI*

## 2017-07-16 PROCEDURE — 36415 COLL VENOUS BLD VENIPUNCTURE: CPT

## 2017-07-16 PROCEDURE — 99232 SBSQ HOSP IP/OBS MODERATE 35: CPT | Performed by: INTERNAL MEDICINE

## 2017-07-16 PROCEDURE — A9270 NON-COVERED ITEM OR SERVICE: HCPCS | Performed by: NURSE PRACTITIONER

## 2017-07-16 PROCEDURE — A9270 NON-COVERED ITEM OR SERVICE: HCPCS | Performed by: HOSPITALIST

## 2017-07-16 PROCEDURE — 700102 HCHG RX REV CODE 250 W/ 637 OVERRIDE(OP): Performed by: INTERNAL MEDICINE

## 2017-07-16 PROCEDURE — 80048 BASIC METABOLIC PNL TOTAL CA: CPT

## 2017-07-16 PROCEDURE — 82962 GLUCOSE BLOOD TEST: CPT | Mod: 91

## 2017-07-16 PROCEDURE — 700102 HCHG RX REV CODE 250 W/ 637 OVERRIDE(OP): Performed by: NURSE PRACTITIONER

## 2017-07-16 PROCEDURE — 700102 HCHG RX REV CODE 250 W/ 637 OVERRIDE(OP): Performed by: HOSPITALIST

## 2017-07-16 PROCEDURE — 700111 HCHG RX REV CODE 636 W/ 250 OVERRIDE (IP): Performed by: HOSPITALIST

## 2017-07-16 RX ORDER — TRAMADOL HYDROCHLORIDE 50 MG/1
50 TABLET ORAL EVERY 6 HOURS PRN
Status: DISCONTINUED | OUTPATIENT
Start: 2017-07-16 | End: 2017-07-18 | Stop reason: HOSPADM

## 2017-07-16 RX ADMIN — INSULIN LISPRO 7 UNITS: 100 INJECTION, SOLUTION INTRAVENOUS; SUBCUTANEOUS at 21:11

## 2017-07-16 RX ADMIN — NICOTINE 21 MG: 21 PATCH, EXTENDED RELEASE TRANSDERMAL at 06:36

## 2017-07-16 RX ADMIN — INSULIN LISPRO 7 UNITS: 100 INJECTION, SOLUTION INTRAVENOUS; SUBCUTANEOUS at 13:01

## 2017-07-16 RX ADMIN — HEPARIN SODIUM 5000 UNITS: 5000 INJECTION, SOLUTION INTRAVENOUS; SUBCUTANEOUS at 06:28

## 2017-07-16 RX ADMIN — INSULIN HUMAN 10 UNITS: 100 INJECTION, SUSPENSION SUBCUTANEOUS at 06:35

## 2017-07-16 RX ADMIN — INSULIN LISPRO 12 UNITS: 100 INJECTION, SOLUTION INTRAVENOUS; SUBCUTANEOUS at 18:04

## 2017-07-16 RX ADMIN — TRAMADOL HYDROCHLORIDE 50 MG: 50 TABLET, COATED ORAL at 21:34

## 2017-07-16 RX ADMIN — STANDARDIZED SENNA CONCENTRATE AND DOCUSATE SODIUM 2 TABLET: 8.6; 5 TABLET, FILM COATED ORAL at 09:50

## 2017-07-16 RX ADMIN — INSULIN LISPRO 7 UNITS: 100 INJECTION, SOLUTION INTRAVENOUS; SUBCUTANEOUS at 06:34

## 2017-07-16 ASSESSMENT — ENCOUNTER SYMPTOMS
SPEECH CHANGE: 0
PND: 0
WEAKNESS: 0
HEARTBURN: 0
NAUSEA: 0
FALLS: 0
WEIGHT LOSS: 0
DIZZINESS: 0
SPUTUM PRODUCTION: 0
COUGH: 0
CONSTIPATION: 0
CHILLS: 0
EYE PAIN: 0
TREMORS: 0
BLURRED VISION: 0
SEIZURES: 0
DIARRHEA: 0
DEPRESSION: 0
HEADACHES: 0
PALPITATIONS: 0
NECK PAIN: 0
WHEEZING: 0
FEVER: 0

## 2017-07-16 ASSESSMENT — PAIN SCALES - GENERAL
PAINLEVEL_OUTOF10: 0
PAINLEVEL_OUTOF10: 4
PAINLEVEL_OUTOF10: 7

## 2017-07-16 ASSESSMENT — LIFESTYLE VARIABLES: SUBSTANCE_ABUSE: 0

## 2017-07-16 NOTE — PROGRESS NOTES
Assumed care of pt at 0745. A/Ox4, discussed plan of care. Pt on room air, tolerating diabetic diet, further education needed.Pt up self with steady gait. MD River and RN Oxana fontaine educated pt to self administer insulin today, nurse to be at bedside and help pt with administration. All needs met at this time. Bed in lowest position, treaded socks on, personal belongings and call light within reach, instructed to call for any assistance.

## 2017-07-16 NOTE — CARE PLAN
Problem: Safety  Goal: Will remain free from falls  Patient free of falls and fall precautions in place

## 2017-07-16 NOTE — PROGRESS NOTES
demonstrated to pt how to draw up insulin and administer, Pt verbalized understanding and demonstrated teaching.

## 2017-07-16 NOTE — PROGRESS NOTES
[facility] Hospitalist Progress Note    Date of Service: 2017    Chief Complaint  31 y.o. male admitted 2017 with DM type 1 newly diagnosed.    Interval Problem Update  7/15 patient has been stable overnight, no significant chief complaint. No acute event. Blood sugar better controlled with insulin. Patient otherwise denies fever, chills, nausea, vomiting, adb pain, SOB, CP, headache, constipation, diarrhea, cough, or sputum.     patient has been stable and no significant chief complaint overnight. No acute bed. Blood sugar is still not controlled need to adjust insulin dosage. Patient remained afebrile and no significant shortness of breath or chest pain.    Consultants/Specialty  none    Disposition  Home        Review of Systems   Constitutional: Negative for fever, chills and weight loss.   HENT: Negative for congestion, ear pain and hearing loss.    Eyes: Negative for blurred vision and pain.   Respiratory: Negative for cough, sputum production and wheezing.    Cardiovascular: Negative for chest pain, palpitations, leg swelling and PND.   Gastrointestinal: Negative for heartburn, nausea, diarrhea and constipation.   Genitourinary: Negative for dysuria, frequency and hematuria.   Musculoskeletal: Negative for joint pain, falls and neck pain.   Skin: Negative for rash.   Neurological: Negative for dizziness, tremors, speech change, seizures, weakness and headaches.   Psychiatric/Behavioral: Negative for depression, suicidal ideas and substance abuse.      Physical Exam  Laboratory/Imaging   Hemodynamics  Temp (24hrs), Av.9 °C (98.5 °F), Min:36.7 °C (98.1 °F), Max:37.2 °C (98.9 °F)   Temperature: 37.2 °C (98.9 °F)  Pulse  Av.5  Min: 69  Max: 99    Blood Pressure: 108/82 mmHg      Respiratory      Respiration: 18, Pulse Oximetry: 99 %             Fluids    Intake/Output Summary (Last 24 hours) at 17 0883  Last data filed at 07/15/17 1700   Gross per 24 hour   Intake   1000 ml   Output       0 ml   Net   1000 ml       Nutrition  Orders Placed This Encounter   Procedures   • Diet Order     Standing Status: Standing      Number of Occurrences: 1      Standing Expiration Date:      Order Specific Question:  Diet:     Answer:  Diabetic [3]     Physical Exam   Constitutional: He is oriented to person, place, and time. He appears well-developed.   HENT:   Head: Normocephalic.   Eyes: EOM are normal. Pupils are equal, round, and reactive to light.   Neck: Normal range of motion. Neck supple. No JVD present. No thyromegaly present.   Cardiovascular: Normal rate, regular rhythm and normal heart sounds.  Exam reveals no gallop and no friction rub.    Pulmonary/Chest: Effort normal and breath sounds normal. No respiratory distress. He has no rales. He exhibits no tenderness.   Abdominal: Soft. Bowel sounds are normal. He exhibits no distension and no mass. There is no tenderness. There is no guarding.   Musculoskeletal: Normal range of motion. He exhibits no edema.   Lymphadenopathy:     He has cervical adenopathy (mild small).   Neurological: He is alert and oriented to person, place, and time. He displays normal reflexes. No cranial nerve deficit.   Skin: Skin is dry. No rash noted.   Psychiatric: He has a normal mood and affect.   Nursing note and vitals reviewed.      Recent Labs      07/14/17   1535   WBC  7.0   RBC  4.57*   HEMOGLOBIN  13.9*   HEMATOCRIT  40.0*   MCV  87.5   MCH  30.4   MCHC  34.8   RDW  40.2   PLATELETCT  227   MPV  11.3     Recent Labs      07/14/17   1535  07/15/17   0216   SODIUM  127*  134*   POTASSIUM  4.4  3.5*   CHLORIDE  92*  106   CO2  24  25   GLUCOSE  682*  190*   BUN  8  9   CREATININE  0.71  0.57   CALCIUM  9.3  8.6                      Assessment/Plan     * Type 1 diabetes mellitus without complication (CMS-HCC)  Assessment & Plan  Newly Diagnosed  Hgb 15  Need insulin and diabetes education.  Patient is willing to do self insulin injection.  BS still not controlled, increased  insulin NPH 15 units twice a day    Hyponatremia  Assessment & Plan  sudohyponatremia  Due to high BS  Resolved      Lymph node enlargement  Assessment & Plan  Reactive  No signs of infection      Labs reviewed, Radiology images reviewed and Medications reviewed  Cordero catheter: No Cordero        DVT prophylaxis - mechanical: SCDs  Ulcer prophylaxis: Not indicated    Assessed for rehab: Patient returned to prior level of function, rehabilitation not indicated at this time     For complexity-based billing, please refer to the history, exam, and decison making above. In addition, I spent 35 minutes caring for the patient today. More than 50% of the time was spent counseling and coordinating care.    I have discussed with RN and CM and SW and other consultants about patient's plan.

## 2017-07-16 NOTE — CARE PLAN
Problem: Communication  Goal: The ability to communicate needs accurately and effectively will improve  Outcome: PROGRESSING AS EXPECTED  Patient had a difficulty understanding education but otherwise communicating his needs

## 2017-07-16 NOTE — CARE PLAN
Problem: Communication  Goal: The ability to communicate needs accurately and effectively will improve  Outcome: PROGRESSING AS EXPECTED  Pt educated on the process of administering insulin, handouts given and demonstration preformed.     Problem: Safety  Goal: Will remain free from injury  Outcome: PROGRESSING AS EXPECTED  Educated the pt about drawing up insulin, signs and symptoms of hypoglycemia, and hyperglycemia.

## 2017-07-17 LAB
GLUCOSE BLD-MCNC: 160 MG/DL (ref 65–99)
GLUCOSE BLD-MCNC: 305 MG/DL (ref 65–99)
GLUCOSE BLD-MCNC: 362 MG/DL (ref 65–99)
GLUCOSE BLD-MCNC: 384 MG/DL (ref 65–99)

## 2017-07-17 PROCEDURE — 82962 GLUCOSE BLOOD TEST: CPT | Mod: 91

## 2017-07-17 PROCEDURE — A9270 NON-COVERED ITEM OR SERVICE: HCPCS | Performed by: HOSPITALIST

## 2017-07-17 PROCEDURE — 99232 SBSQ HOSP IP/OBS MODERATE 35: CPT | Performed by: INTERNAL MEDICINE

## 2017-07-17 PROCEDURE — 770006 HCHG ROOM/CARE - MED/SURG/GYN SEMI*

## 2017-07-17 PROCEDURE — 700102 HCHG RX REV CODE 250 W/ 637 OVERRIDE(OP): Performed by: HOSPITALIST

## 2017-07-17 RX ORDER — INSULIN GLARGINE 100 [IU]/ML
30 INJECTION, SOLUTION SUBCUTANEOUS EVERY EVENING
Status: DISCONTINUED | OUTPATIENT
Start: 2017-07-17 | End: 2017-07-18 | Stop reason: HOSPADM

## 2017-07-17 RX ADMIN — INSULIN LISPRO 12 UNITS: 100 INJECTION, SOLUTION INTRAVENOUS; SUBCUTANEOUS at 17:51

## 2017-07-17 RX ADMIN — INSULIN LISPRO 12 UNITS: 100 INJECTION, SOLUTION INTRAVENOUS; SUBCUTANEOUS at 21:39

## 2017-07-17 RX ADMIN — INSULIN LISPRO 10 UNITS: 100 INJECTION, SOLUTION INTRAVENOUS; SUBCUTANEOUS at 11:59

## 2017-07-17 RX ADMIN — INSULIN LISPRO 3 UNITS: 100 INJECTION, SOLUTION INTRAVENOUS; SUBCUTANEOUS at 05:49

## 2017-07-17 RX ADMIN — NICOTINE 21 MG: 21 PATCH, EXTENDED RELEASE TRANSDERMAL at 05:42

## 2017-07-17 RX ADMIN — INSULIN GLARGINE 30 UNITS: 100 INJECTION, SOLUTION SUBCUTANEOUS at 21:40

## 2017-07-17 ASSESSMENT — ENCOUNTER SYMPTOMS
FALLS: 0
DEPRESSION: 0
SEIZURES: 0
TREMORS: 0
CONSTIPATION: 0
DIZZINESS: 0
HEADACHES: 0
PND: 0
COUGH: 0
FEVER: 0
SPEECH CHANGE: 0
SPUTUM PRODUCTION: 0
PALPITATIONS: 0
CHILLS: 0
DIARRHEA: 0
WHEEZING: 0
NAUSEA: 0
EYE PAIN: 0
HEARTBURN: 0
WEIGHT LOSS: 0
BLURRED VISION: 0
NECK PAIN: 0
WEAKNESS: 0

## 2017-07-17 ASSESSMENT — LIFESTYLE VARIABLES: SUBSTANCE_ABUSE: 0

## 2017-07-17 ASSESSMENT — PAIN SCALES - GENERAL
PAINLEVEL_OUTOF10: 0
PAINLEVEL_OUTOF10: 7

## 2017-07-17 NOTE — PROGRESS NOTES
Pt AOx4, medicated for right neck pain. Diabetic education provided regarding checking his own blood sugar. Pt resting quietly in bed, needs met. Pt is up self with steady gait. Call light within reach, personal belongings available, bed in lowest position, treaded socks on. Hourly rounding in place.

## 2017-07-17 NOTE — DIETARY
Nutrition note:  Met with patient and S/O  for carb counting education.  Discussed sources of carb, healthful carb choices, beverages, snacks, label reading, activity, dining out and estimating portions. They asked appropriate questions and verbalized understanding of all concepts discussed.  Gave printed materials to back up verbal education. S/O not happy about having to stay one more night  I feel that he could use more education   They seem to understand the basis carb counting concept    RD will visit daily to address questions and concerns.

## 2017-07-17 NOTE — CARE PLAN
Problem: Safety  Goal: Will remain free from falls  Pt is up self. Safety precautions in place. Bed in low position, treaded socks on, personal possessions in reach, call light in reach and pt using it to call for assistance.     Problem: Venous Thromboembolism (VTW)/Deep Vein Thrombosis (DVT) Prevention:  Goal: Patient will participate in Venous Thrombosis (VTE)/Deep Vein Thrombosis (DVT)Prevention Measures  Pt refusing heparin injections.

## 2017-07-17 NOTE — PROGRESS NOTES
[facility]ist Progress Note    Date of Service: 2017    Chief Complaint  31 y.o. male admitted 2017 with DM type 1 newly diagnosed.    Interval Problem Update  7/15 patient has been stable overnight, no significant chief complaint. No acute event. Blood sugar better controlled with insulin. Patient otherwise denies fever, chills, nausea, vomiting, adb pain, SOB, CP, headache, constipation, diarrhea, cough, or sputum.     patient has been stable and no significant chief complaint overnight. No acute bed. Blood sugar is still not controlled need to adjust insulin dosage. Patient remained afebrile and no significant shortness of breath or chest pain.     no acute events overnight. Blood sugars still uncontrolled. Has used > 30 units of short acting from sliding scale. Diabetes educator spoke to patient. Changing insulin to lantus for tonight. Teaching how to use sliding scale. If BS and education understood, patient to be discharged tomorrow.    Consultants/Specialty  none    Disposition  Home        Review of Systems   Constitutional: Negative for fever, chills and weight loss.   HENT: Negative for congestion, ear pain and hearing loss.    Eyes: Negative for blurred vision and pain.   Respiratory: Negative for cough, sputum production and wheezing.    Cardiovascular: Negative for chest pain, palpitations, leg swelling and PND.   Gastrointestinal: Negative for heartburn, nausea, diarrhea and constipation.   Genitourinary: Negative for dysuria, frequency and hematuria.   Musculoskeletal: Negative for joint pain, falls and neck pain.   Skin: Negative for rash.   Neurological: Negative for dizziness, tremors, speech change, seizures, weakness and headaches.   Psychiatric/Behavioral: Negative for depression, suicidal ideas and substance abuse.      Physical Exam  Laboratory/Imaging   Hemodynamics  Temp (24hrs), Av.3 °C (97.4 °F), Min:36.1 °C (97 °F), Max:36.5 °C (97.7 °F)   Temperature: 36.5 °C  (97.7 °F)  Pulse  Av.1  Min: 65  Max: 99    Blood Pressure: (!) 92/61 mmHg (RN aware)      Respiratory      Respiration: 18, Pulse Oximetry: 99 %             Fluids    Intake/Output Summary (Last 24 hours) at 17 1429  Last data filed at 17 2100   Gross per 24 hour   Intake    240 ml   Output      0 ml   Net    240 ml       Nutrition  Orders Placed This Encounter   Procedures   • Diet Order     Standing Status: Standing      Number of Occurrences: 1      Standing Expiration Date:      Order Specific Question:  Diet:     Answer:  Diabetic [3]     Physical Exam   Constitutional: He is oriented to person, place, and time. He appears well-developed.   HENT:   Head: Normocephalic.   Eyes: EOM are normal. Pupils are equal, round, and reactive to light.   Neck: Normal range of motion. Neck supple. No JVD present. No thyromegaly present.   Cardiovascular: Normal rate, regular rhythm and normal heart sounds.  Exam reveals no gallop and no friction rub.    Pulmonary/Chest: Effort normal and breath sounds normal. No respiratory distress. He has no rales. He exhibits no tenderness.   Abdominal: Soft. Bowel sounds are normal. He exhibits no distension and no mass. There is no tenderness. There is no guarding.   Musculoskeletal: Normal range of motion. He exhibits no edema.   Lymphadenopathy:     He has cervical adenopathy (mild small).   Neurological: He is alert and oriented to person, place, and time. He displays normal reflexes. No cranial nerve deficit.   Skin: Skin is dry. No rash noted.   Psychiatric: He has a normal mood and affect.   Nursing note and vitals reviewed.      Recent Labs      17   1535   WBC  7.0   RBC  4.57*   HEMOGLOBIN  13.9*   HEMATOCRIT  40.0*   MCV  87.5   MCH  30.4   MCHC  34.8   RDW  40.2   PLATELETCT  227   MPV  11.3     Recent Labs      17   1535  07/15/17   0216  17   0819   SODIUM  127*  134*  138   POTASSIUM  4.4  3.5*  3.6   CHLORIDE  92*  106  106   CO2  24   25  25   GLUCOSE  682*  190*  175*   BUN  8  9  10   CREATININE  0.71  0.57  0.53   CALCIUM  9.3  8.6  8.5                      Assessment/Plan     * Type 1 diabetes mellitus without complication (CMS-[facility])  Assessment & Plan  Newly Diagnosed, insulin antibodies ordered  Hgb 15  BS still not controlled  NPH changed to lantus  Diabetic educator has seen patient today and teaching how to use sliding scale at home    Hyponatremia  Assessment & Plan  Resolved      Lymph node enlargement  Assessment & Plan  Reactive  No signs of infection      Labs reviewed, Radiology images reviewed and Medications reviewed  Cordero catheter: No Cordero        DVT prophylaxis - mechanical: SCDs  Ulcer prophylaxis: Not indicated    Assessed for rehab: Patient returned to prior level of function, rehabilitation not indicated at this time     For complexity-based billing, please refer to the history, exam, and decison making above. In addition, I spent 35 minutes caring for the patient today. More than 50% of the time was spent counseling and coordinating care.    I have discussed with RN and CM and SW and other consultants about patient's plan.

## 2017-07-18 VITALS
OXYGEN SATURATION: 99 % | WEIGHT: 129.85 LBS | HEART RATE: 77 BPM | RESPIRATION RATE: 18 BRPM | DIASTOLIC BLOOD PRESSURE: 73 MMHG | SYSTOLIC BLOOD PRESSURE: 108 MMHG | HEIGHT: 64 IN | BODY MASS INDEX: 22.17 KG/M2 | TEMPERATURE: 97.5 F

## 2017-07-18 LAB
ANION GAP SERPL CALC-SCNC: 5 MMOL/L (ref 0–11.9)
BASOPHILS # BLD AUTO: 0.6 % (ref 0–1.8)
BASOPHILS # BLD: 0.04 K/UL (ref 0–0.12)
BUN SERPL-MCNC: 19 MG/DL (ref 8–22)
CALCIUM SERPL-MCNC: 9.2 MG/DL (ref 8.5–10.5)
CHLORIDE SERPL-SCNC: 100 MMOL/L (ref 96–112)
CO2 SERPL-SCNC: 26 MMOL/L (ref 20–33)
CREAT SERPL-MCNC: 0.68 MG/DL (ref 0.5–1.4)
EOSINOPHIL # BLD AUTO: 0.17 K/UL (ref 0–0.51)
EOSINOPHIL NFR BLD: 2.4 % (ref 0–6.9)
ERYTHROCYTE [DISTWIDTH] IN BLOOD BY AUTOMATED COUNT: 42.8 FL (ref 35.9–50)
GFR SERPL CREATININE-BSD FRML MDRD: >60 ML/MIN/1.73 M 2
GLUCOSE BLD-MCNC: 298 MG/DL (ref 65–99)
GLUCOSE BLD-MCNC: 304 MG/DL (ref 65–99)
GLUCOSE SERPL-MCNC: 382 MG/DL (ref 65–99)
HCT VFR BLD AUTO: 41.3 % (ref 42–52)
HGB BLD-MCNC: 14.1 G/DL (ref 14–18)
IMM GRANULOCYTES # BLD AUTO: 0.06 K/UL (ref 0–0.11)
IMM GRANULOCYTES NFR BLD AUTO: 0.9 % (ref 0–0.9)
LYMPHOCYTES # BLD AUTO: 2.8 K/UL (ref 1–4.8)
LYMPHOCYTES NFR BLD: 40.2 % (ref 22–41)
MCH RBC QN AUTO: 30.5 PG (ref 27–33)
MCHC RBC AUTO-ENTMCNC: 34.1 G/DL (ref 33.7–35.3)
MCV RBC AUTO: 89.2 FL (ref 81.4–97.8)
MONOCYTES # BLD AUTO: 0.54 K/UL (ref 0–0.85)
MONOCYTES NFR BLD AUTO: 7.7 % (ref 0–13.4)
NEUTROPHILS # BLD AUTO: 3.36 K/UL (ref 1.82–7.42)
NEUTROPHILS NFR BLD: 48.2 % (ref 44–72)
NRBC # BLD AUTO: 0 K/UL
NRBC BLD AUTO-RTO: 0 /100 WBC
PLATELET # BLD AUTO: 274 K/UL (ref 164–446)
PMV BLD AUTO: 11.5 FL (ref 9–12.9)
POTASSIUM SERPL-SCNC: 4.6 MMOL/L (ref 3.6–5.5)
RBC # BLD AUTO: 4.63 M/UL (ref 4.7–6.1)
SODIUM SERPL-SCNC: 131 MMOL/L (ref 135–145)
WBC # BLD AUTO: 7 K/UL (ref 4.8–10.8)

## 2017-07-18 PROCEDURE — 36415 COLL VENOUS BLD VENIPUNCTURE: CPT

## 2017-07-18 PROCEDURE — 86337 INSULIN ANTIBODIES: CPT

## 2017-07-18 PROCEDURE — 700102 HCHG RX REV CODE 250 W/ 637 OVERRIDE(OP): Performed by: HOSPITALIST

## 2017-07-18 PROCEDURE — 99239 HOSP IP/OBS DSCHRG MGMT >30: CPT | Performed by: INTERNAL MEDICINE

## 2017-07-18 PROCEDURE — A9270 NON-COVERED ITEM OR SERVICE: HCPCS | Performed by: HOSPITALIST

## 2017-07-18 PROCEDURE — 82962 GLUCOSE BLOOD TEST: CPT

## 2017-07-18 PROCEDURE — 83525 ASSAY OF INSULIN: CPT

## 2017-07-18 PROCEDURE — 80048 BASIC METABOLIC PNL TOTAL CA: CPT

## 2017-07-18 PROCEDURE — 85025 COMPLETE CBC W/AUTO DIFF WBC: CPT

## 2017-07-18 RX ORDER — INSULIN GLARGINE 100 [IU]/ML
30 INJECTION, SOLUTION SUBCUTANEOUS EVERY EVENING
Qty: 10 ML | Refills: 0 | Status: SHIPPED | OUTPATIENT
Start: 2017-07-18 | End: 2018-09-14

## 2017-07-18 RX ADMIN — INSULIN LISPRO 10 UNITS: 100 INJECTION, SOLUTION INTRAVENOUS; SUBCUTANEOUS at 06:26

## 2017-07-18 RX ADMIN — INSULIN LISPRO 7 UNITS: 100 INJECTION, SOLUTION INTRAVENOUS; SUBCUTANEOUS at 11:28

## 2017-07-18 RX ADMIN — NICOTINE 21 MG: 21 PATCH, EXTENDED RELEASE TRANSDERMAL at 06:18

## 2017-07-18 ASSESSMENT — PAIN SCALES - GENERAL: PAINLEVEL_OUTOF10: 0

## 2017-07-18 NOTE — PROGRESS NOTES
Pt AOx4, denies pain. Ambulated hallway with SO, steady gait. Pt back in room, sitting up in bed watching television. Call light within reach, personal belongings available, bed in lowest position, treaded socks on. Hourly rounding in place.

## 2017-07-18 NOTE — DISCHARGE INSTRUCTIONS
Discharge Instructions    Discharged to home by car with friend. Discharged via walking, hospital escort: Refused.  Special equipment needed: Not Applicable    Be sure to schedule a follow-up appointment with your primary care doctor or any specialists as instructed.     Discharge Plan:   Diet Plan: Discussed  Activity Level: Discussed  Confirmed Follow up Appointment: Appointment Scheduled  Confirmed Symptoms Management: Discussed  Medication Reconciliation Updated: Yes  Pneumococcal Vaccine Given - only chart on this line when given: Given (See MAR)  Influenza Vaccine Indication: Indicated: Not available from distributor/    I understand that a diet low in cholesterol, fat, and sodium is recommended for good health. Unless I have been given specific instructions below for another diet, I accept this instruction as my diet prescription.   Other diet: diabetic    Special Instructions: None    · Is patient discharged on Warfarin / Coumadin?   No     · Is patient Post Blood Transfusion?  No    Depression / Suicide Risk    As you are discharged from this [facility] facility, it is important to learn how to keep safe from harming yourself.    Recognize the warning signs:  · Abrupt changes in personality, positive or negative- including increase in energy   · Giving away possessions  · Change in eating patterns- significant weight changes-  positive or negative  · Change in sleeping patterns- unable to sleep or sleeping all the time   · Unwillingness or inability to communicate  · Depression  · Unusual sadness, discouragement and loneliness  · Talk of wanting to die  · Neglect of personal appearance   · Rebelliousness- reckless behavior  · Withdrawal from people/activities they love  · Confusion- inability to concentrate     If you or a loved one observes any of these behaviors or has concerns about self-harm, here's what you can do:  · Talk about it- your feelings and reasons for harming  yourself  · Remove any means that you might use to hurt yourself (examples: pills, rope, extension cords, firearm)  · Get professional help from the community (Mental Health, Substance Abuse, psychological counseling)  · Do not be alone:Call your Safe Contact- someone whom you trust who will be there for you.  · Call your local CRISIS HOTLINE 937-2942 or 370-648-8132  · Call your local Children's Mobile Crisis Response Team [place] (260) 685-9698 or www.[employer]  · Call the toll free National Suicide Prevention Hotlines   · National Suicide Prevention Lifeline 710-972-KJXE (3837)  · National Hope Line Network 800-SUICIDE (445-3802)

## 2017-07-18 NOTE — CARE PLAN
Problem: Safety  Goal: Will remain free from falls  Safety precautions in place. Bed in low position, treaded socks on, personal possessions in reach, call light in reach.     Problem: Knowledge Deficit  Goal: Knowledge of disease process/condition, treatment plan, diagnostic tests, and medications will improve  Discussed plan of care regarding diabetic diet. Answered pt questions.

## 2017-07-18 NOTE — DISCHARGE SUMMARY
Hospital Medicine Discharge Note     Admit Date:  7/14/2017       Discharge Date:   7/18/2017  LOS: 4 days     Primary Care Provider:    Pcp Pt States None    Attending Physician:  Bashir Miner     Discharge Diagnoses:   Principal Problem:    Type 1 diabetes mellitus without complication (CMS-HCC)    Hyponatremia    Lymph node enlargement        Chronic Medical Problems:  Diabetes type 1     Consultants:      Diabetic educator     Studies:  Wbc 7, Hemoglobin 14.1, Hematocrit 41.3, Platelet 274, Sodium 131, Potassium 4.6, CHloride 100, Bicarb 26, Glucose 300, BUn 19, Creatinine 0.68.       Procedures:        None    Hospital Summary (Brief Narrative):       For H and P on admission, please refer to full H and P dictated by Dr. Bocanegra   In brief, Gerber Langston is a 31 y.o. male who presents with right neck lump. Patient reports that he has noticed a small lump in his right neck/lower jaw area. This is been present for about a week. He denies being sick in the recent past. The lump became concerning so he finally decided to come the hospital for evaluation. Where he is found to have blood glucose levels of greater than 600.  He was adm itted for further treatment, he was started on on SSI and also lantus, and also diabetic educator was also consulted. His blodd sugar were better controlled, as per diabetic educator he will need to be discharged on long actin insulin( Basaglar) and also on sliding scale ac only. Pt otherwise has been hemodynamically stable. He will be discharged to home today. And follow up with PCP as outpt regarding his DM.     Disposition:   Discharge home    Condition:  Stable    Activity:   As tolerated     Diet:    Diabetic Diet    Discharge Medications:           Medication List      START taking these medications       Instructions    insulin glargine 100 UNIT/ML Aleena   Last time this was given:  30 Units on 7/17/2017  9:40 PM   Commonly known as:  LANTUS    Inject 30 Units as instructed every  evening.   Dose:  30 Units       insulin lispro 100 UNIT/ML Soln   Last time this was given:  7 Units on 7/18/2017 11:28 AM   Commonly known as:  HUMALOG    Inject 3-14 Units as instructed 4 Times a Day,Before Meals and at Bedtime.   Dose:  3-14 Units               Follow up appointment details :      I encouraged him to call his PCP to confirm follow up after discharge.    Future Appointments  Date Time Provider Department Center   8/2/2017 10:00 AM Donald Weber M.D. [facility] None         Instructions:      The were given instructions to return to the ER if patient's condition worsens      Time Spent on Discharge:     Discharge instructions were discussed with the patient at bedside. Patient  expressed understanding and agreed to comply with all discharge instructions.    37 minutes were spent in the discharge planning and management of this  patient, including more than 50% of the time spent face to face in   Counseling.

## 2017-07-18 NOTE — CONSULTS
"Diabetes education: Met with Gerber to discuss new dx of diabetes (type one vs type two). Discussed what diabetes was, difference between type one and type two, what effects blood sugars, need for insulin, how insulin works, goals for blood sugars, need for carbohydrates and proteins with every meal, what carbohydrates and proteins were, hyperglycemia, hypoglycemia, DKA and ketones.  Pt was taught to use insulin pens and practiced with saline pens and practice device as insulin covered by his insurance comes in pen form only (Basaglar).   Pt was given a One touch Verio Flex meter and taught to use meter and delica lancet. Encouraged pt to do own finger sticks (stick only) with nursing as well as given own insulin.  Spoke with Dr. Jiang regarding changing insulin from NPH to Glargine ( Lantus in hospital and Basaglar outpatient). Discussed possibility pt may need insulin with meals as as well as correction/sliding scale if he has type one.  Pt works different shifts/schedules and NPH would be difficult to use. Glargine better choice with Humalog give pre meal only. How to give the insulin reviewed with patient. Pt wanted to go home but with blood sugars remaining high, and need to start new insulin, pt agreed to stay until tomorrow am. Handouts given to cover areas discussed. CDE call RD to leave message to see patient before discharge ( per note from 7/15/17 \"pt asleep at this time, DM education handouts left at bedside, RD will follow up and provide education\".  Plan: CDE will follow up in am for questions. Pt will need prescriptions for One touch verio test strips and delica lancets to test four times a day. Pt will also need prescription for Basaglar ( Glargine not lantus) kwik pen (box of five), Humalog kwik pen (box of five) with sliding scale written out and for ac only, justo pen needles (box of 100 4 mm). All prescriptions need to have dx code ( either E11.65 for type 2 or E10.65 for type 1), directions, " quantity, and refills for Medicaid HMO to cover. Please call [phone] if needs change.

## 2017-07-18 NOTE — PROGRESS NOTES
"Diabetes education: Met with pt and SO before discharge to review plan and answer questions. Pt had box of \"Tasty O's\" at bedside. Asked if he was snacking on those and he said yes, last night but they do not have any carbs in them. CDE reviewed label reading and where amount of carbs were listed (22 grams) as well as serving size .   Discussed snacks with carbs and protein, sliding scale ( ac only) and need for follow up. Pt has an appointment with PCP on Aug 2, 2017.  Pt was to go home on Basaglar ( per EPIC listed as Lantus??) and sliding scale ac only ( scale not written out nor for ac only per EPIC), but discharge note has Basaglar and sliding scale ac only written (not listed on meds), nor was strips and lancet listed there.   Pt has already left. Pt had been previously instructed if prescriptions wrong to please call floor nurse to contact hospitalist.  "

## 2017-07-18 NOTE — PROGRESS NOTES
Diabetes education: CDE met with pt this afternoon. Please see consult note.  Plan: CDE will follow up in am for questions. Pt will need prescriptions for One touch verio test strips and delica lancets to test four times a day. Pt will also need prescription for Basaglar ( Glargine not lantus) kwik pen (box of five), Humalog kwik pen (box of five) with sliding scale written out and for ac only, justo pen needles (box of 100 4 mm). All prescriptions need to have dx code ( either E11.65 for type 2 or E10.65 for type 1), directions, quantity, and refills for Medicaid O to cover. Please call [phone] if needs change.  Pt needs a PCP, and an appointment for follow up before discharge.

## 2017-07-19 LAB — INSULIN P FAST SERPL-ACNC: 15 UIU/ML (ref 3–19)

## 2017-07-20 LAB — INSULIN HUMAN AB SER-ACNC: <0.4 U/ML (ref 0–0.4)

## 2017-09-15 ENCOUNTER — HOSPITAL ENCOUNTER (EMERGENCY)
Facility: MEDICAL CENTER | Age: 32
End: 2017-09-15
Payer: MEDICAID

## 2017-09-15 VITALS
HEART RATE: 118 BPM | DIASTOLIC BLOOD PRESSURE: 76 MMHG | RESPIRATION RATE: 17 BRPM | OXYGEN SATURATION: 95 % | SYSTOLIC BLOOD PRESSURE: 131 MMHG | WEIGHT: 145.5 LBS | TEMPERATURE: 98.9 F | BODY MASS INDEX: 24.98 KG/M2

## 2017-09-15 LAB — GLUCOSE BLD-MCNC: 193 MG/DL (ref 65–99)

## 2017-09-15 PROCEDURE — 302449 STATCHG TRIAGE ONLY (STATISTIC)

## 2017-09-15 PROCEDURE — 82962 GLUCOSE BLOOD TEST: CPT

## 2017-09-15 NOTE — ED NOTES
"Ambulatory to triage with report of  Chief Complaint   Patient presents with   • Epistaxis     no active bleeding noted at this time.  states \"I was diagnosed with diabetes and don't know what to do\", when asked if nose is currently bleeding, pt states \"no, it is\" and stickes finger in nose to show blood, scant amt of blood noted on finger.  pt requesting finger stick in triage, FSBG 193   pt with occasional odd remarks for example when screened for neutropenia (currently on any chemotherapt?) pt states \"should I, would it help me in the long run?\".  Also states \"I'm not gonna lie to you, I had two beers today to help\"  "

## 2017-09-16 VITALS
HEART RATE: 99 BPM | RESPIRATION RATE: 14 BRPM | WEIGHT: 145.94 LBS | TEMPERATURE: 98.3 F | SYSTOLIC BLOOD PRESSURE: 128 MMHG | BODY MASS INDEX: 24.92 KG/M2 | DIASTOLIC BLOOD PRESSURE: 76 MMHG | HEIGHT: 64 IN | OXYGEN SATURATION: 97 %

## 2017-09-16 LAB — GLUCOSE BLD-MCNC: 198 MG/DL (ref 65–99)

## 2017-09-16 PROCEDURE — 82962 GLUCOSE BLOOD TEST: CPT

## 2017-09-16 PROCEDURE — 302449 STATCHG TRIAGE ONLY (STATISTIC)

## 2017-09-16 ASSESSMENT — PAIN SCALES - GENERAL: PAINLEVEL_OUTOF10: 0

## 2017-09-17 ENCOUNTER — HOSPITAL ENCOUNTER (EMERGENCY)
Facility: MEDICAL CENTER | Age: 32
End: 2017-09-17
Payer: MEDICAID

## 2017-09-17 NOTE — ED NOTES
"Chief Complaint   Patient presents with   • RLQ Pain     Pt reports symptoms \"for a while\"    • High Blood Sugar     Pt recently diagnosed with diabetes, unsure on how to manage and lost his monitoring equipment.     /76   Pulse 99   Temp 36.8 °C (98.3 °F) (Temporal)   Resp 14   Ht 1.626 m (5' 4\")   Wt 66.2 kg (145 lb 15.1 oz)   SpO2 97%   BMI 25.05 kg/m²     Pt ambulated into triage, complaints as above. . VS as above, NAD, encouraged to return to the triage nurse or tech with any new complaints or symptoms. Urine collection cup in a bag, given to patient as well as instructions on their use, pt verbalized understanding.  "

## 2017-09-18 ENCOUNTER — HOSPITAL ENCOUNTER (EMERGENCY)
Facility: MEDICAL CENTER | Age: 32
End: 2017-09-18
Payer: MEDICAID

## 2017-09-18 VITALS
HEART RATE: 87 BPM | WEIGHT: 151.9 LBS | TEMPERATURE: 98 F | RESPIRATION RATE: 18 BRPM | BODY MASS INDEX: 26.07 KG/M2 | DIASTOLIC BLOOD PRESSURE: 89 MMHG | SYSTOLIC BLOOD PRESSURE: 149 MMHG | OXYGEN SATURATION: 98 %

## 2017-09-18 LAB — GLUCOSE BLD-MCNC: 236 MG/DL (ref 65–99)

## 2017-09-18 PROCEDURE — 82962 GLUCOSE BLOOD TEST: CPT

## 2017-09-18 PROCEDURE — 302449 STATCHG TRIAGE ONLY (STATISTIC)

## 2017-09-18 ASSESSMENT — PAIN SCALES - GENERAL: PAINLEVEL_OUTOF10: 3

## 2017-09-18 NOTE — ED NOTES
Gerber Langston  32 y.o.  male  Chief Complaint   Patient presents with   • Hip Pain     left     Present to triage c/o left hip pain. Per patient he had GLF a month ago but pain went away. For the past week pain getting worse with movement. Ambulatory in triage.

## 2018-03-25 ENCOUNTER — HOSPITAL ENCOUNTER (EMERGENCY)
Facility: MEDICAL CENTER | Age: 33
End: 2018-03-26
Attending: EMERGENCY MEDICINE
Payer: MEDICAID

## 2018-03-25 DIAGNOSIS — R73.9 HYPERGLYCEMIA: ICD-10-CM

## 2018-03-25 DIAGNOSIS — Z86.39 HX OF DIABETES MELLITUS: ICD-10-CM

## 2018-03-25 DIAGNOSIS — K04.7 INFECTED DENTAL CARRIES: ICD-10-CM

## 2018-03-25 DIAGNOSIS — K02.9 INFECTED DENTAL CARRIES: ICD-10-CM

## 2018-03-25 LAB
ALBUMIN SERPL BCP-MCNC: 4.4 G/DL (ref 3.2–4.9)
ALBUMIN/GLOB SERPL: 1.6 G/DL
ALP SERPL-CCNC: 88 U/L (ref 30–99)
ALT SERPL-CCNC: 14 U/L (ref 2–50)
ANION GAP SERPL CALC-SCNC: 10 MMOL/L (ref 0–11.9)
APPEARANCE UR: CLEAR
AST SERPL-CCNC: 12 U/L (ref 12–45)
B-OH-BUTYR SERPL-MCNC: 0.19 MMOL/L (ref 0.02–0.27)
BASE EXCESS BLDV CALC-SCNC: 2 MMOL/L
BASOPHILS # BLD AUTO: 0.4 % (ref 0–1.8)
BASOPHILS # BLD: 0.03 K/UL (ref 0–0.12)
BILIRUB SERPL-MCNC: 0.4 MG/DL (ref 0.1–1.5)
BILIRUB UR QL STRIP.AUTO: NEGATIVE
BODY TEMPERATURE: NORMAL CENTIGRADE
BUN SERPL-MCNC: 9 MG/DL (ref 8–22)
CALCIUM SERPL-MCNC: 9.7 MG/DL (ref 8.5–10.5)
CHLORIDE SERPL-SCNC: 94 MMOL/L (ref 96–112)
CO2 SERPL-SCNC: 23 MMOL/L (ref 20–33)
COLOR UR: YELLOW
CREAT SERPL-MCNC: 0.69 MG/DL (ref 0.5–1.4)
CULTURE IF INDICATED INDCX: NO UA CULTURE
EOSINOPHIL # BLD AUTO: 0.17 K/UL (ref 0–0.51)
EOSINOPHIL NFR BLD: 2.3 % (ref 0–6.9)
ERYTHROCYTE [DISTWIDTH] IN BLOOD BY AUTOMATED COUNT: 40.3 FL (ref 35.9–50)
GLOBULIN SER CALC-MCNC: 2.7 G/DL (ref 1.9–3.5)
GLUCOSE BLD-MCNC: 401 MG/DL (ref 65–99)
GLUCOSE BLD-MCNC: 562 MG/DL (ref 65–99)
GLUCOSE SERPL-MCNC: 663 MG/DL (ref 65–99)
GLUCOSE UR STRIP.AUTO-MCNC: >=1000 MG/DL
HCO3 BLDV-SCNC: 28 MMOL/L (ref 24–28)
HCT VFR BLD AUTO: 48.1 % (ref 42–52)
HGB BLD-MCNC: 16.8 G/DL (ref 14–18)
IMM GRANULOCYTES # BLD AUTO: 0.01 K/UL (ref 0–0.11)
IMM GRANULOCYTES NFR BLD AUTO: 0.1 % (ref 0–0.9)
KETONES UR STRIP.AUTO-MCNC: NEGATIVE MG/DL
LEUKOCYTE ESTERASE UR QL STRIP.AUTO: NEGATIVE
LYMPHOCYTES # BLD AUTO: 1.83 K/UL (ref 1–4.8)
LYMPHOCYTES NFR BLD: 25.1 % (ref 22–41)
MAGNESIUM SERPL-MCNC: 2 MG/DL (ref 1.5–2.5)
MCH RBC QN AUTO: 30.4 PG (ref 27–33)
MCHC RBC AUTO-ENTMCNC: 34.9 G/DL (ref 33.7–35.3)
MCV RBC AUTO: 87 FL (ref 81.4–97.8)
MICRO URNS: ABNORMAL
MONOCYTES # BLD AUTO: 0.46 K/UL (ref 0–0.85)
MONOCYTES NFR BLD AUTO: 6.3 % (ref 0–13.4)
NEUTROPHILS # BLD AUTO: 4.79 K/UL (ref 1.82–7.42)
NEUTROPHILS NFR BLD: 65.8 % (ref 44–72)
NITRITE UR QL STRIP.AUTO: NEGATIVE
NRBC # BLD AUTO: 0 K/UL
NRBC BLD-RTO: 0 /100 WBC
PCO2 BLDV: 46.7 MMHG (ref 41–51)
PH BLDV: 7.39 [PH] (ref 7.31–7.45)
PH UR STRIP.AUTO: 6.5 [PH]
PHOSPHATE SERPL-MCNC: 4.3 MG/DL (ref 2.5–4.5)
PLATELET # BLD AUTO: 252 K/UL (ref 164–446)
PMV BLD AUTO: 12.3 FL (ref 9–12.9)
PO2 BLDV: 33.2 MMHG (ref 25–40)
POTASSIUM SERPL-SCNC: 4.4 MMOL/L (ref 3.6–5.5)
PROT SERPL-MCNC: 7.1 G/DL (ref 6–8.2)
PROT UR QL STRIP: NEGATIVE MG/DL
RBC # BLD AUTO: 5.53 M/UL (ref 4.7–6.1)
RBC UR QL AUTO: NEGATIVE
SAO2 % BLDV: 66.9 %
SODIUM SERPL-SCNC: 127 MMOL/L (ref 135–145)
SP GR UR STRIP.AUTO: 1.04
UROBILINOGEN UR STRIP.AUTO-MCNC: 0.2 MG/DL
WBC # BLD AUTO: 7.3 K/UL (ref 4.8–10.8)

## 2018-03-25 PROCEDURE — 82803 BLOOD GASES ANY COMBINATION: CPT

## 2018-03-25 PROCEDURE — 82010 KETONE BODYS QUAN: CPT

## 2018-03-25 PROCEDURE — 85025 COMPLETE CBC W/AUTO DIFF WBC: CPT

## 2018-03-25 PROCEDURE — 80053 COMPREHEN METABOLIC PANEL: CPT

## 2018-03-25 PROCEDURE — 700105 HCHG RX REV CODE 258: Performed by: EMERGENCY MEDICINE

## 2018-03-25 PROCEDURE — 81003 URINALYSIS AUTO W/O SCOPE: CPT

## 2018-03-25 PROCEDURE — 36415 COLL VENOUS BLD VENIPUNCTURE: CPT

## 2018-03-25 PROCEDURE — 84100 ASSAY OF PHOSPHORUS: CPT

## 2018-03-25 PROCEDURE — 99285 EMERGENCY DEPT VISIT HI MDM: CPT

## 2018-03-25 PROCEDURE — 96374 THER/PROPH/DIAG INJ IV PUSH: CPT

## 2018-03-25 PROCEDURE — 82962 GLUCOSE BLOOD TEST: CPT

## 2018-03-25 PROCEDURE — 700102 HCHG RX REV CODE 250 W/ 637 OVERRIDE(OP): Performed by: EMERGENCY MEDICINE

## 2018-03-25 PROCEDURE — A9270 NON-COVERED ITEM OR SERVICE: HCPCS | Performed by: EMERGENCY MEDICINE

## 2018-03-25 PROCEDURE — 94760 N-INVAS EAR/PLS OXIMETRY 1: CPT

## 2018-03-25 PROCEDURE — 83735 ASSAY OF MAGNESIUM: CPT

## 2018-03-25 PROCEDURE — 96361 HYDRATE IV INFUSION ADD-ON: CPT

## 2018-03-25 RX ORDER — SODIUM CHLORIDE 9 MG/ML
1000 INJECTION, SOLUTION INTRAVENOUS ONCE
Status: COMPLETED | OUTPATIENT
Start: 2018-03-25 | End: 2018-03-25

## 2018-03-25 RX ORDER — HYDROCODONE BITARTRATE AND ACETAMINOPHEN 5; 325 MG/1; MG/1
1 TABLET ORAL ONCE
Status: COMPLETED | OUTPATIENT
Start: 2018-03-25 | End: 2018-03-25

## 2018-03-25 RX ORDER — PENICILLIN V POTASSIUM 500 MG/1
500 TABLET ORAL ONCE
Status: COMPLETED | OUTPATIENT
Start: 2018-03-25 | End: 2018-03-25

## 2018-03-25 RX ORDER — SODIUM CHLORIDE 9 MG/ML
2000 INJECTION, SOLUTION INTRAVENOUS ONCE
Status: COMPLETED | OUTPATIENT
Start: 2018-03-25 | End: 2018-03-25

## 2018-03-25 RX ADMIN — INSULIN HUMAN 10 UNITS: 100 INJECTION, SOLUTION PARENTERAL at 21:50

## 2018-03-25 RX ADMIN — SODIUM CHLORIDE 2000 ML: 9 INJECTION, SOLUTION INTRAVENOUS at 21:49

## 2018-03-25 RX ADMIN — SODIUM CHLORIDE 1000 ML: 9 INJECTION, SOLUTION INTRAVENOUS at 21:02

## 2018-03-25 RX ADMIN — PENICILLIN V POTASIUM 500 MG: 500 TABLET OROPHARYNGEAL at 21:02

## 2018-03-25 RX ADMIN — HYDROCODONE BITARTRATE AND ACETAMINOPHEN 1 TABLET: 5; 325 TABLET ORAL at 21:02

## 2018-03-25 ASSESSMENT — LIFESTYLE VARIABLES: DO YOU DRINK ALCOHOL: NO

## 2018-03-25 ASSESSMENT — PAIN SCALES - GENERAL
PAINLEVEL_OUTOF10: 7
PAINLEVEL_OUTOF10: 4
PAINLEVEL_OUTOF10: 7

## 2018-03-26 VITALS
HEIGHT: 64 IN | HEART RATE: 62 BPM | RESPIRATION RATE: 18 BRPM | OXYGEN SATURATION: 95 % | BODY MASS INDEX: 24.28 KG/M2 | SYSTOLIC BLOOD PRESSURE: 134 MMHG | WEIGHT: 142.2 LBS | DIASTOLIC BLOOD PRESSURE: 88 MMHG | TEMPERATURE: 97.8 F

## 2018-03-26 LAB
GLUCOSE BLD-MCNC: 210 MG/DL (ref 65–99)
GLUCOSE BLD-MCNC: 244 MG/DL (ref 65–99)

## 2018-03-26 PROCEDURE — 82962 GLUCOSE BLOOD TEST: CPT

## 2018-03-26 RX ORDER — PENICILLIN V POTASSIUM 500 MG/1
500 TABLET ORAL 4 TIMES DAILY
Qty: 40 TAB | Refills: 0 | Status: SHIPPED | OUTPATIENT
Start: 2018-03-26 | End: 2018-12-01 | Stop reason: CLARIF

## 2018-03-26 NOTE — ED NOTES
Faina from Lab called with critical result of glucose at 663. Critical lab result read back to Faina .   Dr. Hernandez notified of critical lab result at 7648.  Critical lab result read back by Dr. Hernandez.

## 2018-03-26 NOTE — DISCHARGE INSTRUCTIONS
Hyperglycemia  Hyperglycemia is when the sugar (glucose) level in your blood is too high. It may not cause symptoms. If you do have symptoms, they may include warning signs, such as:  · Feeling more thirsty than normal.  · Hunger.  · Feeling tired.  · Needing to pee (urinate) more than normal.  · Blurry eyesight (vision).  You may get other symptoms as it gets worse, such as:  · Dry mouth.  · Not being hungry (loss of appetite).  · Fruity-smelling breath.  · Weakness.  · Weight gain or loss that is not planned. Weight loss may be fast.  · A tingling or numb feeling in your hands or feet.  · Headache.  · Skin that does not bounce back quickly when it is lightly pinched and released (poor skin turgor).  · Pain in your belly (abdomen).  · Cuts or bruises that heal slowly.  High blood sugar can happen to people who do or do not have diabetes. High blood sugar can happen slowly or quickly, and it can be an emergency.  Follow these instructions at home:  General instructions  · Take over-the-counter and prescription medicines only as told by your doctor.  · Do not use products that contain nicotine or tobacco, such as cigarettes and e-cigarettes. If you need help quitting, ask your doctor.  · Limit alcohol intake to no more than 1 drink per day for nonpregnant women and 2 drinks per day for men. One drink equals 12 oz of beer, 5 oz of wine, or 1½ oz of hard liquor.  · Manage stress. If you need help with this, ask your doctor.  · Keep all follow-up visits as told by your doctor. This is important.  Eating and drinking  · Stay at a healthy weight.  · Exercise regularly, as told by your doctor.  · Drink enough fluid, especially when you:  ¨ Exercise.  ¨ Get sick.  ¨ Are in hot temperatures.  · Eat healthy foods, such as:  ¨ Low-fat (lean) proteins.  ¨ Complex carbs (complex carbohydrates), such as whole wheat bread or brown rice.  ¨ Fresh fruits and vegetables.  ¨ Low-fat dairy products.  ¨ Healthy fats.  · Drink enough  fluid to keep your pee (urine) clear or pale yellow.  If you have diabetes:  · Make sure you know the symptoms of hyperglycemia.  · Follow your diabetes management plan, as told by your doctor. Make sure you:  ¨ Take insulin and medicines as told.  ¨ Follow your exercise plan.  ¨ Follow your meal plan. Eat on time. Do not skip meals.  ¨ Check your blood sugar as often as told. Make sure to check before and after exercise. If you exercise longer or in a different way than you normally do, check your blood sugar more often.  ¨ Follow your sick day plan whenever you cannot eat or drink normally. Make this plan ahead of time with your doctor.  · Share your diabetes management plan with people in your workplace, school, and household.  · Check your urine for ketones when you are ill and as told by your doctor.  · Carry a card or wear jewelry that says that you have diabetes.  Contact a doctor if:  · Your blood sugar level is higher than 240 mg/dL (13.3 mmol/L) for 2 days in a row.  · You have problems keeping your blood sugar in your target range.  · High blood sugar happens often for you.  Get help right away if:  · You have trouble breathing.  · You have a change in how you think, feel, or act (mental status).  · You feel sick to your stomach (nauseous), and that feeling does not go away.  · You cannot stop throwing up (vomiting).  These symptoms may be an emergency. Do not wait to see if the symptoms will go away. Get medical help right away. Call your local emergency services (911 in the [place] Do not drive yourself to the hospital.   Summary  · Hyperglycemia is when the sugar (glucose) level in your blood is too high.  · High blood sugar can happen to people who do or do not have diabetes.  · Make sure you drink enough fluids, eat healthy foods, and exercise regularly.  · Contact your doctor if you have problems keeping your blood sugar in your target range.  This information is not intended to replace advice given  to you by your health care provider. Make sure you discuss any questions you have with your health care provider.  Document Released: 10/15/2010 Document Revised: 09/04/2017 Document Reviewed: 09/04/2017  [employer] Interactive Patient Education © 2017 [employer]        Diabetes, Frequently Asked Questions  WHAT IS DIABETES?  Most of the food we eat is turned into glucose (sugar). Our bodies use it for energy. The pancreas makes a hormone called insulin. It helps glucose get into the cells of our bodies. When you have diabetes, your body either does not make enough insulin or cannot use its own insulin as well as it should. This causes sugars to build up in your blood.  WHAT ARE THE SYMPTOMS OF DIABETES?  · Frequent urination.   · Excessive thirst.   · Unexplained weight loss.   · Extreme hunger.   · Blurred vision.   · Tingling or numbness in hands or feet.   · Feeling very tired much of the time.   · Dry, itchy skin.   · Sores that are slow to heal.   · Yeast infections.   WHAT ARE THE TYPES OF DIABETES?  Type 1 Diabetes   · About 10% of affected people have this type.   · Usually occurs before the age of 30.   · Usually occurs in thin to normal weight people.   Type 2 Diabetes  · About 90% of affected people have this type.   · Usually occurs after the age of 40.   · Usually occurs in overweight people.   · More likely to have:   · A family history of diabetes.   · A history of diabetes during pregnancy (gestational diabetes).   · High blood pressure.   · High cholesterol and triglycerides.   Gestational Diabetes  · Occurs in about 4% of pregnancies.   · Usually goes away after the baby is born.   · More likely to occur in women with:   · Family history of diabetes.   · Previous gestational diabetes.   · Obese.   · Over 25 years old.   WHAT IS PRE-DIABETES?  Pre-diabetes means your blood glucose is higher than normal, but lower than the diabetes range. It also means you are at risk of getting type 2 diabetes and  heart disease. If you are told you have pre-diabetes, have your blood glucose checked again in 1 to 2 years.  WHAT IS THE TREATMENT FOR DIABETES?  Treatment is aimed at keeping blood glucose near normal levels at all times. Learning how to manage this yourself is important in treating diabetes. Depending on the type of diabetes you have, your treatment will include one or more of the following:  · Monitoring your blood glucose.   · Meal planning.   · Exercise.   · Oral medicine (pills) or insulin.   CAN DIABETES BE PREVENTED?  With type 1 diabetes, prevention is more difficult, because the triggers that cause it are not yet known.  With type 2 diabetes, prevention is more likely, with lifestyle changes:  · Maintain a healthy weight.   · Eat healthy.   · Exercise.   IS THERE A CURE FOR DIABETES?  No, there is no cure for diabetes. There is a lot of research going on that is looking for a cure, and progress is being made. Diabetes can be treated and controlled. People with diabetes can manage their diabetes and lead normal, active lives.  SHOULD I BE TESTED FOR DIABETES?  If you are at least 45 years old, you should be tested for diabetes. You should be tested again every 3 years. If you are 45 or older and overweight, you may want to get tested more often. If you are younger than 45, overweight, and have one or more of the following risk factors, you should be tested:  · Family history of diabetes.   · Inactive lifestyle.   · High blood pressure.   WHAT ARE SOME OTHER SOURCES FOR INFORMATION ON DIABETES?  The following organizations may help in your search for more information on diabetes:  National Diabetes Education Program (NDEP)  Internet: http://www.ndep.nih.gov/resources  American Diabetes Association  Internet: http://www.diabetes.org   Juvenile Diabetes Foundation International  Internet: http://www.jdf.org  Document Released: 12/20/2004 Document Revised: 03/11/2013 Document Reviewed: 10/15/2010  [employer]®  Patient Information ©2013 [facility].

## 2018-03-26 NOTE — ED TRIAGE NOTES
"Chief Complaint   Patient presents with   • High Blood Sugar     \"I'm a type 1 diabetic and haven't had insulin in 2 months\". Denies checking BS at home. BS in triage 562.    • Weight Loss     States, \"I have been losing weight, I have blurred vision and my kidneys hurt.\"    • Blurred Vision   • Mouth Pain     Also c/o RT upper molar tooth pain.      Pt amb to triage with above. NAD noted. VSS.   Pt returned to lobby. Educated on triage process and to inform staff of any changes.     /88   Pulse 95   Temp 36.6 °C (97.8 °F)   Resp 18   Ht 1.626 m (5' 4\")   Wt 64.5 kg (142 lb 3.2 oz)   SpO2 98%   BMI 24.41 kg/m²     "

## 2018-03-26 NOTE — ED PROVIDER NOTES
"ED Provider Note    CHIEF COMPLAINT  Chief Complaint   Patient presents with   • High Blood Sugar     \"I'm a type 1 diabetic and haven't had insulin in 2 months\". Denies checking BS at home. BS in triage 562.    • Weight Loss     States, \"I have been losing weight, I have blurred vision and my kidneys hurt.\"    • Blurred Vision   • Mouth Pain     Also c/o RT upper molar tooth pain.        HPI  Gerber Langston is a 32 y.o. male who presents to emergency department with chief complaint of blurred vision, weight loss about 20 pounds over the last 2 months, not having any insulin for last 2 months, and tooth pain for last week. Patient states that he was told that he was diabetic type I that is supposed to be on insulin. He states that he did not have a doctor and did not know who to follow up with so he simply stopped using insulin. Patient states that he has not been watching his diet. He states that he's had about 20 pounds weight loss over the last 2 months. He states he has had some nausea and vomiting, some diarrhea and is gradually had worsening blurred vision. Patient denies any current abdominal pain. State he has some pain with urination    He also complain of pain in the right upper 2nd molar the last week or so. He states he has an appointment next week with a dentist. Denies any facial swelling, fever, chills, difficulty swallowing or breathing.    REVIEW OF SYSTEMS  Pertinent positives include weight loss, blurred vision, dental pain, nausea, vomiting, diarrhea, and dysuria. Pertinent negatives include no fever, no chills, no facial swelling, no difficulty swallowing,  All other review of systems is negative      PAST MEDICAL HISTORY   has a past medical history of Diabetes (CMS-[facility]).type I diabetic    SOCIAL HISTORY  Social History     Social History Main Topics   • Smoking status: Current Every Day Smoker     Packs/day: 0.50     Types: Cigarettes   • Smokeless tobacco: Never Used   • Alcohol use Yes    " "  Comment: socially   • Drug use: No   • Sexual activity: Not on file       SURGICAL HISTORY   has a past surgical history that includes hernia repair (Left, 4/2016).    CURRENT MEDICATIONS  Home Medications     Reviewed by Harpal Magaña R.N. (Registered Nurse) on 03/25/18 at 2031  Med List Status: Partial   Medication Last Dose Status   insulin glargine (LANTUS) 100 UNIT/ML Solution  Active   insulin lispro (HUMALOG) 100 UNIT/ML Solution  Active                ALLERGIES  No Known Allergies    PHYSICAL EXAM  VITAL SIGNS: /88   Pulse 62   Temp 36.6 °C (97.8 °F)   Resp 18   Ht 1.626 m (5' 4\")   Wt 64.5 kg (142 lb 3.2 oz)   SpO2 95%   BMI 24.41 kg/m²   Constitutional: Well developed, Well nourished, no acute distress.   HENT: Normocephalic, Atraumatic, Oropharynx moist, slightly dry mucous membranes, patient has a significant dental caries in the right upper 2nd molar. There is no surrounding abscess or drainage. He has pain to percussion of that tooth. No signs of Maicol angina. Oral exudates.   Eyes: Conjunctiva normal, No discharge.   Cardiovascular: Normal heart rate, Normal rhythm, No murmurs, equal pulses.   Pulmonary: Normal breath sounds, No respiratory distress, No wheezing, No rales, No rhonchi.  Abdomen:Soft, No tenderness.  Back: No CVA tenderness.   Skin: Warm, Dry, No erythema, No rash.   Neurologic: Alert & oriented x 3, Normal motor function,  No focal deficits noted.   Psychiatric: Affect normal, Judgment normal, Mood normal.       Laboratory tests  Results for orders placed or performed during the hospital encounter of 03/25/18   CBC w/ Differential   Result Value Ref Range    WBC 7.3 4.8 - 10.8 K/uL    RBC 5.53 4.70 - 6.10 M/uL    Hemoglobin 16.8 14.0 - 18.0 g/dL    Hematocrit 48.1 42.0 - 52.0 %    MCV 87.0 81.4 - 97.8 fL    MCH 30.4 27.0 - 33.0 pg    MCHC 34.9 33.7 - 35.3 g/dL    RDW 40.3 35.9 - 50.0 fL    Platelet Count 252 164 - 446 K/uL    MPV 12.3 9.0 - 12.9 fL    Neutrophils-Polys " 65.80 44.00 - 72.00 %    Lymphocytes 25.10 22.00 - 41.00 %    Monocytes 6.30 0.00 - 13.40 %    Eosinophils 2.30 0.00 - 6.90 %    Basophils 0.40 0.00 - 1.80 %    Immature Granulocytes 0.10 0.00 - 0.90 %    Nucleated RBC 0.00 /100 WBC    Neutrophils (Absolute) 4.79 1.82 - 7.42 K/uL    Lymphs (Absolute) 1.83 1.00 - 4.80 K/uL    Monos (Absolute) 0.46 0.00 - 0.85 K/uL    Eos (Absolute) 0.17 0.00 - 0.51 K/uL    Baso (Absolute) 0.03 0.00 - 0.12 K/uL    Immature Granulocytes (abs) 0.01 0.00 - 0.11 K/uL    NRBC (Absolute) 0.00 K/uL   Complete Metabolic Panel (CMP)   Result Value Ref Range    Sodium 127 (L) 135 - 145 mmol/L    Potassium 4.4 3.6 - 5.5 mmol/L    Chloride 94 (L) 96 - 112 mmol/L    Co2 23 20 - 33 mmol/L    Anion Gap 10.0 0.0 - 11.9    Glucose 663 (HH) 65 - 99 mg/dL    Bun 9 8 - 22 mg/dL    Creatinine 0.69 0.50 - 1.40 mg/dL    Calcium 9.7 8.5 - 10.5 mg/dL    AST(SGOT) 12 12 - 45 U/L    ALT(SGPT) 14 2 - 50 U/L    Alkaline Phosphatase 88 30 - 99 U/L    Total Bilirubin 0.4 0.1 - 1.5 mg/dL    Albumin 4.4 3.2 - 4.9 g/dL    Total Protein 7.1 6.0 - 8.2 g/dL    Globulin 2.7 1.9 - 3.5 g/dL    A-G Ratio 1.6 g/dL   Urinalysis, culture if indicated   Result Value Ref Range    Color Yellow     Character Clear     Specific Gravity 1.041 <1.035    Ph 6.5 5.0 - 8.0    Glucose >=1000 (A) Negative mg/dL    Ketones Negative Negative mg/dL    Protein Negative Negative mg/dL    Bilirubin Negative Negative    Urobilinogen, Urine 0.2 Negative    Nitrite Negative Negative    Leukocyte Esterase Negative Negative    Occult Blood Negative Negative    Micro Urine Req see below     Culture Indicated No UA Culture   BETA-HYDROXYBUTYRIC ACID   Result Value Ref Range    beta-Hydroxybutyric Acid 0.19 0.02 - 0.27 mmol/L   VENOUS BLOOD GAS   Result Value Ref Range    Venous Bg Ph 7.39 7.31 - 7.45    Venous Bg Pco2 46.7 41.0 - 51.0 mmHg    Venous Bg Po2 33.2 25.0 - 40.0 mmHg    Venous Bg O2 Saturation 66.9 %    Venous Bg Hco3 28 24 - 28 mmol/L     Venous Bg Base Excess 2 mmol/L    Body Temp see below Centigrade   MAGNESIUM   Result Value Ref Range    Magnesium 2.0 1.5 - 2.5 mg/dL   PHOSPHORUS   Result Value Ref Range    Phosphorus 4.3 2.5 - 4.5 mg/dL   ESTIMATED GFR   Result Value Ref Range    GFR If African American >60 >60 mL/min/1.73 m 2    GFR If Non African American >60 >60 mL/min/1.73 m 2   ACCU-CHEK GLUCOSE   Result Value Ref Range    Glucose - Accu-Ck 562 (HH) 65 - 99 mg/dL   ACCU-CHEK GLUCOSE   Result Value Ref Range    Glucose - Accu-Ck 401 (HH) 65 - 99 mg/dL   ACCU-CHEK GLUCOSE   Result Value Ref Range    Glucose - Accu-Ck 210 (H) 65 - 99 mg/dL   ACCU-CHEK GLUCOSE   Result Value Ref Range    Glucose - Accu-Ck 244 (H) 65 - 99 mg/dL         COURSE & MEDICAL DECISION MAKING  Pertinent Labs & Imaging studies reviewed. (See chart for details)  Differential includes diabetic ketoacidosis, hyperglycemia, electrolyte abnormality, dehydration, UTI, affected dental caries    I ordered IV fluids and labs for the patient. For his dental caries I order penicillin.    IV fluids were given for potential DKA and dehydration. Patient responded appropriately decreased glucose below 200    Patient was given 10 units of insulin regular IV. As well as several liters of IV fluids. With that his blood glucose came down to an appropriate level    Medical decision making: Patient presents with significantly elevated glucose. He states that he is type I diabetic but even though he's not been taking insulin his not in DKA therefore I feel the patient is more likely a type II diabetic. Patient's glucose came down with IV fluids and 10 units of insulin. Because of the dramatic drop in the patient's glucose with this I am concerned that if he is actually given insulin he may become hypoglycemic. I think the patient could try an oral medication like metformin which would not cause hypoglycemia. Therefore patient will be started on metformin and I have discussed with the patient  that he needs to follow-up with a provider to make sure that his glucose is controlled.    Patient also is complaining of dental pain. He was given a penicillin tablet for this. I do not see any dental caries or signs of Maicol angina. Patient states she has an appointment with dentist next week therefore I'll place about it is limited course of Penicillin VK.    The patient will return for new or worsening symptoms and is stable at the time of discharge.    The patient is referred to a primary physician for blood pressure management, diabetic screening, and for all other preventative health concerns.      DISPOSITION:  Patient will be discharged home in stable condition.    FOLLOW UP:  Your Physician  Varies    Schedule an appointment as soon as possible for a visit in 1 week      [address] [id number] [phone]    If you need a doctor    [address] [address]  [phone]    If you need a doctor      OUTPATIENT MEDICATIONS:  Discharge Medication List as of 3/26/2018 12:58 AM      START taking these medications    Details   metFORMIN (GLUCOPHAGE) 500 MG Tab Take 1 Tab by mouth 2 times a day, with meals., Disp-60 Tab, R-0, Print Rx Paper           Penicillin  mg tablet 4 times a day ×10 days. This prescription was printed after the patient left the nursing staff called the patient to allow him to come get it in the morning.      FINAL IMPRESSION  1. Hyperglycemia    2. Hx of diabetes mellitus    3. Infected dental carries               Electronically signed by: Kofi Hernandez, 3/25/2018 8:26 PM  This record was made with a voice recognition software. The software is not perfect. I have tried to correct any grammar, spelling or context errors to the best of my ability, but errors may still remain. Interpretation of this chart should be taken in this context.

## 2018-06-21 ENCOUNTER — HOSPITAL ENCOUNTER (EMERGENCY)
Facility: MEDICAL CENTER | Age: 33
End: 2018-06-21
Attending: EMERGENCY MEDICINE
Payer: MEDICAID

## 2018-06-21 VITALS
HEART RATE: 83 BPM | WEIGHT: 150 LBS | HEIGHT: 65 IN | BODY MASS INDEX: 24.99 KG/M2 | TEMPERATURE: 97.7 F | DIASTOLIC BLOOD PRESSURE: 67 MMHG | RESPIRATION RATE: 14 BRPM | SYSTOLIC BLOOD PRESSURE: 103 MMHG | OXYGEN SATURATION: 99 %

## 2018-06-21 DIAGNOSIS — R73.9 HYPERGLYCEMIA: ICD-10-CM

## 2018-06-21 LAB
ALBUMIN SERPL BCP-MCNC: 4.1 G/DL (ref 3.2–4.9)
ALBUMIN/GLOB SERPL: 1.5 G/DL
ALP SERPL-CCNC: 112 U/L (ref 30–99)
ALT SERPL-CCNC: 30 U/L (ref 2–50)
ANION GAP SERPL CALC-SCNC: 8 MMOL/L (ref 0–11.9)
AST SERPL-CCNC: 30 U/L (ref 12–45)
BASOPHILS # BLD AUTO: 0.5 % (ref 0–1.8)
BASOPHILS # BLD: 0.04 K/UL (ref 0–0.12)
BILIRUB SERPL-MCNC: 0.5 MG/DL (ref 0.1–1.5)
BUN SERPL-MCNC: 11 MG/DL (ref 8–22)
CALCIUM SERPL-MCNC: 9.3 MG/DL (ref 8.5–10.5)
CHLORIDE SERPL-SCNC: 98 MMOL/L (ref 96–112)
CO2 SERPL-SCNC: 25 MMOL/L (ref 20–33)
CREAT SERPL-MCNC: 0.75 MG/DL (ref 0.5–1.4)
EOSINOPHIL # BLD AUTO: 0.12 K/UL (ref 0–0.51)
EOSINOPHIL NFR BLD: 1.4 % (ref 0–6.9)
ERYTHROCYTE [DISTWIDTH] IN BLOOD BY AUTOMATED COUNT: 39 FL (ref 35.9–50)
GLOBULIN SER CALC-MCNC: 2.7 G/DL (ref 1.9–3.5)
GLUCOSE BLD-MCNC: 501 MG/DL (ref 65–99)
GLUCOSE SERPL-MCNC: 566 MG/DL (ref 65–99)
HCT VFR BLD AUTO: 39.2 % (ref 42–52)
HGB BLD-MCNC: 13.9 G/DL (ref 14–18)
IMM GRANULOCYTES # BLD AUTO: 0.02 K/UL (ref 0–0.11)
IMM GRANULOCYTES NFR BLD AUTO: 0.2 % (ref 0–0.9)
LYMPHOCYTES # BLD AUTO: 2.13 K/UL (ref 1–4.8)
LYMPHOCYTES NFR BLD: 24.6 % (ref 22–41)
MCH RBC QN AUTO: 30.5 PG (ref 27–33)
MCHC RBC AUTO-ENTMCNC: 35.5 G/DL (ref 33.7–35.3)
MCV RBC AUTO: 86.2 FL (ref 81.4–97.8)
MONOCYTES # BLD AUTO: 0.56 K/UL (ref 0–0.85)
MONOCYTES NFR BLD AUTO: 6.5 % (ref 0–13.4)
NEUTROPHILS # BLD AUTO: 5.79 K/UL (ref 1.82–7.42)
NEUTROPHILS NFR BLD: 66.8 % (ref 44–72)
NRBC # BLD AUTO: 0 K/UL
NRBC BLD-RTO: 0 /100 WBC
PLATELET # BLD AUTO: 260 K/UL (ref 164–446)
PMV BLD AUTO: 11.2 FL (ref 9–12.9)
POTASSIUM SERPL-SCNC: 3.9 MMOL/L (ref 3.6–5.5)
PROT SERPL-MCNC: 6.8 G/DL (ref 6–8.2)
RBC # BLD AUTO: 4.55 M/UL (ref 4.7–6.1)
SODIUM SERPL-SCNC: 131 MMOL/L (ref 135–145)
WBC # BLD AUTO: 8.7 K/UL (ref 4.8–10.8)

## 2018-06-21 PROCEDURE — 700105 HCHG RX REV CODE 258

## 2018-06-21 PROCEDURE — 82962 GLUCOSE BLOOD TEST: CPT

## 2018-06-21 PROCEDURE — 85025 COMPLETE CBC W/AUTO DIFF WBC: CPT

## 2018-06-21 PROCEDURE — 80053 COMPREHEN METABOLIC PANEL: CPT

## 2018-06-21 PROCEDURE — 99283 EMERGENCY DEPT VISIT LOW MDM: CPT

## 2018-06-21 PROCEDURE — 36415 COLL VENOUS BLD VENIPUNCTURE: CPT

## 2018-06-21 RX ORDER — SODIUM CHLORIDE 9 MG/ML
INJECTION, SOLUTION INTRAVENOUS CONTINUOUS
Status: DISCONTINUED | OUTPATIENT
Start: 2018-06-21 | End: 2018-06-21 | Stop reason: HOSPADM

## 2018-06-21 RX ORDER — SODIUM CHLORIDE 9 MG/ML
1000 INJECTION, SOLUTION INTRAVENOUS ONCE
Status: COMPLETED | OUTPATIENT
Start: 2018-06-21 | End: 2018-06-21

## 2018-06-21 RX ADMIN — SODIUM CHLORIDE 1000 ML: 9 INJECTION, SOLUTION INTRAVENOUS at 17:35

## 2018-06-21 ASSESSMENT — PAIN SCALES - GENERAL: PAINLEVEL_OUTOF10: 0

## 2018-06-22 NOTE — ED NOTES
Bailee from Lab called with critical result of glucose 566. Critical lab result read back to Bailee.   Dr. Nava notified of critical lab result.

## 2018-06-22 NOTE — ED TRIAGE NOTES
Gerber Rhoades Kian 32 y.o. male who presents to ED with law enforcment for c/o being found to have elevated blood glucose on arrival to [facility]. PMHx of DM    Patient is alert and oriented x 4, respirations even and unlabored, no acute distress noted.     Stretcher low, wheels locked, call light within reach.     FSBS 501 on arrival to ED. Patient reports that he hasn't used his insulin in a while and that he does not have a meter for home use.

## 2018-06-22 NOTE — ED PROVIDER NOTES
"ED Provider Note    CHIEF COMPLAINT  Chief Complaint   Patient presents with   • Hyperglycemia     brought to correctional facility, found to have elevated blood glucose       HPI  Roman Garcia is a 32 y.o. male who presents with history of being sent from the [facility] because of episode of hyperglycemia. Evidently he was arrested. Taken to the [facility]. Noted to have elevated glucose and told to go to the emergency room. He has no complaints no fever no chills no nausea no vomiting no diarrhea no lightheadedness. Does have a history of insulin-dependent diabetes but he running out of his insulin. He cannot recall his last insulin    REVIEW OF SYSTEMS  See HPI for further details. History diabetesDenies other G.I., G.U.. endrocine, cardiovascular, respriatory or neurological problems.  All other systems are negative.     PAST MEDICAL HISTORY  Past Medical History:   Diagnosis Date   • Diabetes (CMS-HCC) (HCC)        FAMILY HISTORY  No family history on file.    SOCIAL HISTORY  Social History     Social History   • Marital status: Single     Spouse name: N/A   • Number of children: N/A   • Years of education: N/A     Social History Main Topics   • Smoking status: Current Every Day Smoker     Packs/day: 0.50     Types: Cigarettes   • Smokeless tobacco: Never Used   • Alcohol use Yes      Comment: socially   • Drug use: No   • Sexual activity: Not on file     Other Topics Concern   • Not on file     Social History Narrative   • No narrative on file       SURGICAL HISTORY  Past Surgical History:   Procedure Laterality Date   • HERNIA REPAIR Left 4/2016    inguinal       CURRENT MEDICATIONS  Home Medications    **Home medications have not yet been reviewed for this encounter**         ALLERGIES  No Known Allergies    PHYSICAL EXAM  VITAL SIGNS: /67   Pulse 83   Temp 36.5 °C (97.7 °F)   Resp 14   Ht 1.651 m (5' 5\")   Wt 68 kg (150 lb)   SpO2 95%   BMI 24.96 kg/m²    Constitutional: Well developed, Well " nourished, No acute distress, Non-toxic appearance.   HENT: Normocephalic, Atraumatic, Bilateral external ears normal, Oropharynx moist, No oral exudates, Nose normal.   Eyes: PERRL, EOMI, Conjunctiva normal, No discharge.   Neck: Normal range of motion, No tenderness, Supple, No stridor.   Lymphatic: No lymphadenopathy noted.   Cardiovascular: Normal heart rate, Normal rhythm, No murmurs, No rubs, No gallops.   Thorax & Lungs: Normal breath sounds, No respiratory distress, No wheezing, No chest tenderness.   Abdomen:  No tenderness, no guarding no rigidity and the abdomen is soft.  No masses, No pulsatile masses.  Skin: Warm, Dry, No erythema, No rash.   Back: No tenderness, No CVA tenderness.   Extremities: Intact distal pulses, No edema, No tenderness, No cyanosis, No clubbing.   Musculoskeletal: Good range of motion in all major joints. No tenderness to palpation or major deformities noted.   Neurologic: Alert & oriented x 3, Normal motor function, Normal sensory function, No focal deficits noted.   Psychiatric: Affect normal, Judgment normal, Mood normal.       RADIOLOGY/PROCEDURES      COURSE & MEDICAL DECISION MAKING  Pertinent Labs & Imaging studies reviewed. (See chart for details) white count hematocrit platelets normal. Glucose, 501, point of care    He has a history of diabetes, cannot recall his last insulin, noted to have a elevated glucose tonight. He is given IV normal saline because of his hyperglycemia, in an effort to bring his glucose  FINAL IMPRESSION  1.   1. Hyperglycemia        2.   3.     Disposition  This patient disappeared from the emergency room, he eloped without telling staff  Electronically signed by: Jf Nava, 6/21/2018 6:10 PM

## 2018-09-14 ENCOUNTER — HOSPITAL ENCOUNTER (EMERGENCY)
Facility: MEDICAL CENTER | Age: 33
End: 2018-09-14
Attending: EMERGENCY MEDICINE
Payer: MEDICAID

## 2018-09-14 VITALS
SYSTOLIC BLOOD PRESSURE: 127 MMHG | BODY MASS INDEX: 22.43 KG/M2 | WEIGHT: 131.39 LBS | HEART RATE: 99 BPM | DIASTOLIC BLOOD PRESSURE: 79 MMHG | TEMPERATURE: 98.5 F | RESPIRATION RATE: 16 BRPM | HEIGHT: 64 IN | OXYGEN SATURATION: 97 %

## 2018-09-14 DIAGNOSIS — Z76.0 MEDICATION REFILL: ICD-10-CM

## 2018-09-14 DIAGNOSIS — E13.8 OTHER SPECIFIED DIABETES MELLITUS WITH COMPLICATION, WITH LONG-TERM CURRENT USE OF INSULIN: ICD-10-CM

## 2018-09-14 DIAGNOSIS — Z79.4 OTHER SPECIFIED DIABETES MELLITUS WITH COMPLICATION, WITH LONG-TERM CURRENT USE OF INSULIN: ICD-10-CM

## 2018-09-14 PROCEDURE — 99284 EMERGENCY DEPT VISIT MOD MDM: CPT

## 2018-09-14 RX ORDER — INSULIN GLARGINE 100 [IU]/ML
30 INJECTION, SOLUTION SUBCUTANEOUS EVERY EVENING
Qty: 10 ML | Refills: 0 | Status: SHIPPED | OUTPATIENT
Start: 2018-09-14 | End: 2018-12-01 | Stop reason: CLARIF

## 2018-09-14 NOTE — ED TRIAGE NOTES
Pt ambulatory to triage. Pt was just release from group home yesterday and has a PCP appointment next Friday. Pt needs a prescription for short and long acting insulin.     Chief Complaint   Patient presents with   • Medication Refill     Pt placed in lobby, updated on triage process. Pt educated to notified RN or triage tech if changes in condition.

## 2018-09-14 NOTE — ED PROVIDER NOTES
"ED Provider Note    Scribed for Matilda Guidry M.D. by Tristin Sevilla. 9/14/2018  1:35 PM    Primary care provider: Pcp Pt States None  Means of arrival: walk in  History obtained from: patient  History limited by: none    CHIEF COMPLAINT  Chief Complaint   Patient presents with   • Medication Refill       HPI  Gerber Langston is a 33 y.o. male who presents to the Emergency Department for medication refill. Patient reports a history of diabetes and states that he ran out of his insulin yesterday, and is unable to follow up with his primary for a few more days, so he presents to the ED for medication refill. He denies any medical complaints at this time.       REVIEW OF SYSTEMS  Medication refill. No medical complaints.     See history of present illness. E.    PAST MEDICAL HISTORY   has a past medical history of Diabetes (CMS-HCC) (HCC).    SURGICAL HISTORY   has a past surgical history that includes hernia repair (Left, 4/2016).    SOCIAL HISTORY  Social History   Substance Use Topics   • Smoking status: Current Every Day Smoker     Packs/day: 0.50     Types: Cigarettes   • Smokeless tobacco: Never Used   • Alcohol use Yes      Comment: socially      History   Drug Use No       FAMILY HISTORY  None noted    CURRENT MEDICATIONS  Current Outpatient Prescriptions:   •  metFORMIN (GLUCOPHAGE) 500 MG Tab, Take 1 Tab by mouth 2 times a day, with meals., Disp: 60 Tab, Rfl: 0  •  penicillin v potassium (VEETID) 500 MG Tab, Take 1 Tab by mouth 4 times a day., Disp: 40 Tab, Rfl: 0  •  insulin glargine (LANTUS) 100 UNIT/ML Solution, Inject 30 Units as instructed every evening., Disp: 10 mL, Rfl: 0  •  insulin lispro (HUMALOG) 100 UNIT/ML Solution, Inject 3-14 Units as instructed 4 Times a Day,Before Meals and at Bedtime., Disp: 10 mL, Rfl: 0    ALLERGIES  No Known Allergies    PHYSICAL EXAM  VITAL SIGNS: /79   Pulse 99   Temp 36.9 °C (98.5 °F) (Temporal)   Resp 16   Ht 1.626 m (5' 4\")   Wt 59.6 kg (131 lb 6.3 " oz)   SpO2 97%   BMI 22.55 kg/m²     Constitutional: Well developed, Well nourished, No acute distress, Non-toxic appearance.   HEENT: Normocephalic, Atraumatic,  external ears normal, pharynx pink,  Mucous  Membranes moist, No rhinorrhea or mucosal edema  Eyes: PERRL, EOMI, Conjunctiva normal, No discharge.   Neck: Normal range of motion, No tenderness, Supple, No stridor.   Lymphatic: No lymphadenopathy    Cardiovascular: Regular Rate and Rhythm, No murmurs,  rubs, or gallops.   Thorax & Lungs: Lungs clear to auscultation bilaterally, No respiratory distress, No wheezes, rhales or rhonchi, No chest wall tenderness.   Abdomen: Bowel sounds normal, Soft, non tender, non distended,  No pulsatile masses., no rebound guarding or peritoneal signs.   Skin: Warm, Dry, No erythema, No rash,   Extremities: Equal, intact distal pulses, No cyanosis, No tenderness.   Musculoskeletal: Good range of motion in all major joints. No tenderness to palpation or major deformities noted.   Neurologic: Alert & awake, no focal deficits  Psychiatric: Affect normal    DIAGNOSTIC STUDIES / PROCEDURES    COURSE & MEDICAL DECISION MAKING  Nursing notes, VS, PMSFHx reviewed in chart.     1:35 PM - Patient seen and examined at bedside. He will be discharged with a prescription for Glucophage, Lantus, Humalog. He has established with a primary and plans to follow up with his PCP as soon as possible. Patient is instructed to return with any new or worsening symptoms.     The patient will return for new or worsening symptoms and is stable at the time of discharge.    The patient is referred to a primary physician for blood pressure management, diabetic screening, and for all other preventative health concerns.    DISPOSITION:  Patient will be discharged home in stable condition.    FOLLOW UP:  No follow-up provider specified.    OUTPATIENT MEDICATIONS:  New Prescriptions    SMART DIABETES VANTAGE LANCETS MISC    1 Container by Does not apply  route 2 Times a Day.       FINAL IMPRESSION  1. Medication refill    2. Other specified diabetes mellitus with complication, with long-term current use of insulin (HCC)          ITristin (Scribe), am scribing for, and in the presence of, Matilda Guidry M.D..    Electronically signed by: Tristin Sevilla (Scribe), 9/14/2018    IMatilda M.D. personally performed the services described in this documentation, as scribed by Tristin Sevilla in my presence, and it is both accurate and complete.    The note accurately reflects work and decisions made by me.  Matilda Guidry  9/14/2018  3:00 PM

## 2018-09-14 NOTE — DISCHARGE INSTRUCTIONS
Blood Glucose Monitoring, Adult  Monitoring your blood glucose (also know as blood sugar) helps you to manage your diabetes. It also helps you and your health care provider monitor your diabetes and determine how well your treatment plan is working.  WHY SHOULD YOU MONITOR YOUR BLOOD GLUCOSE?  · It can help you understand how food, exercise, and medicine affect your blood glucose.  · It allows you to know what your blood glucose is at any given moment. You can quickly tell if you are having low blood glucose (hypoglycemia) or high blood glucose (hyperglycemia).  · It can help you and your health care provider know how to adjust your medicines.  · It can help you understand how to manage an illness or adjust medicine for exercise.  WHEN SHOULD YOU TEST?  Your health care provider will help you decide how often you should check your blood glucose. This may depend on the type of diabetes you have, your diabetes control, or the types of medicines you are taking. Be sure to write down all of your blood glucose readings so that this information can be reviewed with your health care provider. See below for examples of testing times that your health care provider may suggest.  Type 1 Diabetes  · Test at least 2 times per day if your diabetes is well controlled, if you are using an insulin pump, or if you perform multiple daily injections.  · If your diabetes is not well controlled or if you are sick, you may need to test more often.  · It is a good idea to also test:  ¨ Before every insulin injection.  ¨ Before and after exercise.  ¨ Between meals and 2 hours after a meal.  ¨ Occasionally between 2:00 a.m. and 3:00 a.m.  Type 2 Diabetes  · If you are taking insulin, test at least 2 times per day. However, it is best to test before every insulin injection.  · If you take medicines by mouth (orally), test 2 times a day.  · If you are on a controlled diet, test once a day.  · If your diabetes is not well controlled or if you  "are sick, you may need to monitor more often.  HOW TO MONITOR YOUR BLOOD GLUCOSE  Supplies Needed  · Blood glucose meter.  · Test strips for your meter. Each meter has its own strips. You must use the strips that go with your own meter.  · A pricking needle (lancet).  · A device that holds the lancet (lancing device).  · A journal or log book to write down your results.  Procedure  · Wash your hands with soap and water. Alcohol is not preferred.  · Prick the side of your finger (not the tip) with the lancet.  · Gently milk the finger until a small drop of blood appears.  · Follow the instructions that come with your meter for inserting the test strip, applying blood to the strip, and using your blood glucose meter.  Other Areas to Get Blood for Testing  Some meters allow you to use other areas of your body (other than your finger) to test your blood. These areas are called alternative sites. The most common alternative sites are:  · The forearm.  · The thigh.  · The back area of the lower leg.  · The palm of the hand.  The blood flow in these areas is slower. Therefore, the blood glucose values you get may be delayed, and the numbers are different from what you would get from your fingers. Do not use alternative sites if you think you are having hypoglycemia. Your reading will not be accurate. Always use a finger if you are having hypoglycemia. Also, if you cannot feel your lows (hypoglycemia unawareness), always use your fingers for your blood glucose checks.  ADDITIONAL TIPS FOR GLUCOSE MONITORING  · Do not reuse lancets.  · Always carry your supplies with you.  · All blood glucose meters have a 24-hour \"hotline\" number to call if you have questions or need help.  · Adjust (calibrate) your blood glucose meter with a control solution after finishing a few boxes of strips.  BLOOD GLUCOSE RECORD KEEPING  It is a good idea to keep a daily record or log of your blood glucose readings. Most glucose meters, if not all, " keep your glucose records stored in the meter. Some meters come with the ability to download your records to your home computer. Keeping a record of your blood glucose readings is especially helpful if you are wanting to look for patterns. Make notes to go along with the blood glucose readings because you might forget what happened at that exact time. Keeping good records helps you and your health care provider to work together to achieve good diabetes management.      This information is not intended to replace advice given to you by your health care provider. Make sure you discuss any questions you have with your health care provider.     Document Released: 12/20/2004 Document Revised: 01/08/2016 Document Reviewed: 05/12/2014  [employer] Patient Education ©2016 [employer]    Diabetes, Frequently Asked Questions  WHAT IS DIABETES?  Most of the food we eat is turned into glucose (sugar). Our bodies use it for energy. The pancreas makes a hormone called insulin. It helps glucose get into the cells of our bodies. When you have diabetes, your body either does not make enough insulin or cannot use its own insulin as well as it should. This causes sugars to build up in your blood.  WHAT ARE THE SYMPTOMS OF DIABETES?  · Frequent urination.   · Excessive thirst.   · Unexplained weight loss.   · Extreme hunger.   · Blurred vision.   · Tingling or numbness in hands or feet.   · Feeling very tired much of the time.   · Dry, itchy skin.   · Sores that are slow to heal.   · Yeast infections.   WHAT ARE THE TYPES OF DIABETES?  Type 1 Diabetes   · About 10% of affected people have this type.   · Usually occurs before the age of 30.   · Usually occurs in thin to normal weight people.   Type 2 Diabetes  · About 90% of affected people have this type.   · Usually occurs after the age of 40.   · Usually occurs in overweight people.   · More likely to have:   · A family history of diabetes.   · A history of diabetes during  pregnancy (gestational diabetes).   · High blood pressure.   · High cholesterol and triglycerides.   Gestational Diabetes  · Occurs in about 4% of pregnancies.   · Usually goes away after the baby is born.   · More likely to occur in women with:   · Family history of diabetes.   · Previous gestational diabetes.   · Obese.   · Over 25 years old.   WHAT IS PRE-DIABETES?  Pre-diabetes means your blood glucose is higher than normal, but lower than the diabetes range. It also means you are at risk of getting type 2 diabetes and heart disease. If you are told you have pre-diabetes, have your blood glucose checked again in 1 to 2 years.  WHAT IS THE TREATMENT FOR DIABETES?  Treatment is aimed at keeping blood glucose near normal levels at all times. Learning how to manage this yourself is important in treating diabetes. Depending on the type of diabetes you have, your treatment will include one or more of the following:  · Monitoring your blood glucose.   · Meal planning.   · Exercise.   · Oral medicine (pills) or insulin.   CAN DIABETES BE PREVENTED?  With type 1 diabetes, prevention is more difficult, because the triggers that cause it are not yet known.  With type 2 diabetes, prevention is more likely, with lifestyle changes:  · Maintain a healthy weight.   · Eat healthy.   · Exercise.   IS THERE A CURE FOR DIABETES?  No, there is no cure for diabetes. There is a lot of research going on that is looking for a cure, and progress is being made. Diabetes can be treated and controlled. People with diabetes can manage their diabetes and lead normal, active lives.  SHOULD I BE TESTED FOR DIABETES?  If you are at least 45 years old, you should be tested for diabetes. You should be tested again every 3 years. If you are 45 or older and overweight, you may want to get tested more often. If you are younger than 45, overweight, and have one or more of the following risk factors, you should be tested:  · Family history of diabetes.    · Inactive lifestyle.   · High blood pressure.   WHAT ARE SOME OTHER SOURCES FOR INFORMATION ON DIABETES?  The following organizations may help in your search for more information on diabetes:  National Diabetes Education Program (NDEP)  Internet: http://www.ndep.nih.gov/resources  American Diabetes Association  Internet: http://www.diabetes.org   Juvenile Diabetes Foundation International  Internet: http://www.jdf.org  Document Released: 12/20/2004 Document Revised: 03/11/2013 Document Reviewed: 10/15/2010  [employer]® Patient Information ©2013 [employer], [employer].

## 2018-09-14 NOTE — ED NOTES
Patient discharged with friend.  Prescriptions provided for medication refills.  Patient has follow up with PCP.  Patient verbalizes understanding of when to return to the ED.  All questions answered.

## 2018-09-15 NOTE — DISCHARGE PLANNING
Pt 's insurance does not cover his humalog and lantus as prescribed yesterday.   Pt's insurance is Medicaid [facility].  New orders received from Dr. Gary Gansert.      Admalog 100 units  #15 ml  Sig: 3-10 units QID  No refills    Basaglar 100 units  #15 ml  Si units Q HS  (Kwik Pen)  No refills      Nanopen needles Box 100  True Metrix Meter, test strips and lancets    Original scripts were scanned and under Media tab.    1700 call to pt to  new scripts.  Both phone listed are [facility].  Scripts will be left out in the ER lobby file.

## 2018-10-31 ENCOUNTER — HOSPITAL ENCOUNTER (EMERGENCY)
Facility: MEDICAL CENTER | Age: 33
End: 2018-10-31
Payer: MEDICAID

## 2018-10-31 VITALS
BODY MASS INDEX: 21.11 KG/M2 | OXYGEN SATURATION: 98 % | WEIGHT: 123.68 LBS | TEMPERATURE: 98.1 F | HEART RATE: 99 BPM | SYSTOLIC BLOOD PRESSURE: 118 MMHG | RESPIRATION RATE: 16 BRPM | HEIGHT: 64 IN | DIASTOLIC BLOOD PRESSURE: 77 MMHG

## 2018-10-31 PROCEDURE — 302449 STATCHG TRIAGE ONLY (STATISTIC)

## 2018-10-31 NOTE — ED NOTES
Patient called multiple times, not in lounge, not in senior lounge, not in lobby bathroom, will call again in 10 minutes.

## 2018-10-31 NOTE — ED NOTES
Called patient in lobby and senior josh, looked outside to see if he was smoking.  Could not find him, removing from system

## 2018-11-15 ENCOUNTER — HOSPITAL ENCOUNTER (EMERGENCY)
Facility: MEDICAL CENTER | Age: 33
End: 2018-11-15
Attending: EMERGENCY MEDICINE
Payer: MEDICAID

## 2018-11-15 ENCOUNTER — TELEPHONE (OUTPATIENT)
Dept: MEDICAL GROUP | Facility: MEDICAL CENTER | Age: 33
End: 2018-11-15

## 2018-11-15 VITALS
RESPIRATION RATE: 18 BRPM | SYSTOLIC BLOOD PRESSURE: 105 MMHG | HEART RATE: 66 BPM | BODY MASS INDEX: 22.21 KG/M2 | WEIGHT: 130.07 LBS | HEIGHT: 64 IN | TEMPERATURE: 98.2 F | DIASTOLIC BLOOD PRESSURE: 74 MMHG

## 2018-11-15 DIAGNOSIS — R73.9 HYPERGLYCEMIA: ICD-10-CM

## 2018-11-15 LAB
ALBUMIN SERPL BCP-MCNC: 4 G/DL (ref 3.2–4.9)
ALBUMIN/GLOB SERPL: 1.5 G/DL
ALP SERPL-CCNC: 110 U/L (ref 30–99)
ALT SERPL-CCNC: 43 U/L (ref 2–50)
ANION GAP SERPL CALC-SCNC: 11 MMOL/L (ref 0–11.9)
ANION GAP SERPL CALC-SCNC: 11 MMOL/L (ref 0–11.9)
AST SERPL-CCNC: 31 U/L (ref 12–45)
BASE EXCESS BLDV CALC-SCNC: -7 MMOL/L
BASOPHILS # BLD AUTO: 0.4 % (ref 0–1.8)
BASOPHILS # BLD: 0.03 K/UL (ref 0–0.12)
BILIRUB SERPL-MCNC: 0.4 MG/DL (ref 0.1–1.5)
BODY TEMPERATURE: ABNORMAL CENTIGRADE
BUN SERPL-MCNC: 6 MG/DL (ref 8–22)
BUN SERPL-MCNC: 6 MG/DL (ref 8–22)
CALCIUM SERPL-MCNC: 8.9 MG/DL (ref 8.5–10.5)
CALCIUM SERPL-MCNC: 8.9 MG/DL (ref 8.5–10.5)
CHLORIDE SERPL-SCNC: 99 MMOL/L (ref 96–112)
CHLORIDE SERPL-SCNC: 99 MMOL/L (ref 96–112)
CO2 SERPL-SCNC: 23 MMOL/L (ref 20–33)
CO2 SERPL-SCNC: 23 MMOL/L (ref 20–33)
CREAT SERPL-MCNC: 0.62 MG/DL (ref 0.5–1.4)
CREAT SERPL-MCNC: 0.62 MG/DL (ref 0.5–1.4)
EOSINOPHIL # BLD AUTO: 0.08 K/UL (ref 0–0.51)
EOSINOPHIL NFR BLD: 1.1 % (ref 0–6.9)
ERYTHROCYTE [DISTWIDTH] IN BLOOD BY AUTOMATED COUNT: 40.6 FL (ref 35.9–50)
GLOBULIN SER CALC-MCNC: 2.7 G/DL (ref 1.9–3.5)
GLUCOSE BLD-MCNC: 178 MG/DL (ref 65–99)
GLUCOSE BLD-MCNC: 329 MG/DL (ref 65–99)
GLUCOSE SERPL-MCNC: 425 MG/DL (ref 65–99)
GLUCOSE SERPL-MCNC: 425 MG/DL (ref 65–99)
HCO3 BLDV-SCNC: 18 MMOL/L (ref 24–28)
HCT VFR BLD AUTO: 38.8 % (ref 42–52)
HGB BLD-MCNC: 13.5 G/DL (ref 14–18)
IMM GRANULOCYTES # BLD AUTO: 0.02 K/UL (ref 0–0.11)
IMM GRANULOCYTES NFR BLD AUTO: 0.3 % (ref 0–0.9)
LYMPHOCYTES # BLD AUTO: 2.12 K/UL (ref 1–4.8)
LYMPHOCYTES NFR BLD: 30.1 % (ref 22–41)
MCH RBC QN AUTO: 30.7 PG (ref 27–33)
MCHC RBC AUTO-ENTMCNC: 34.8 G/DL (ref 33.7–35.3)
MCV RBC AUTO: 88.2 FL (ref 81.4–97.8)
MONOCYTES # BLD AUTO: 0.56 K/UL (ref 0–0.85)
MONOCYTES NFR BLD AUTO: 7.9 % (ref 0–13.4)
NEUTROPHILS # BLD AUTO: 4.24 K/UL (ref 1.82–7.42)
NEUTROPHILS NFR BLD: 60.2 % (ref 44–72)
NRBC # BLD AUTO: 0 K/UL
NRBC BLD-RTO: 0 /100 WBC
PCO2 BLDV: 34.2 MMHG (ref 41–51)
PH BLDV: 7.35 [PH] (ref 7.31–7.45)
PLATELET # BLD AUTO: 239 K/UL (ref 164–446)
PMV BLD AUTO: 11.3 FL (ref 9–12.9)
PO2 BLDV: 66.6 MMHG (ref 25–40)
POTASSIUM SERPL-SCNC: 3.5 MMOL/L (ref 3.6–5.5)
POTASSIUM SERPL-SCNC: 3.5 MMOL/L (ref 3.6–5.5)
PROT SERPL-MCNC: 6.7 G/DL (ref 6–8.2)
RBC # BLD AUTO: 4.4 M/UL (ref 4.7–6.1)
SAO2 % BLDV: 92.3 %
SODIUM SERPL-SCNC: 133 MMOL/L (ref 135–145)
SODIUM SERPL-SCNC: 133 MMOL/L (ref 135–145)
WBC # BLD AUTO: 7.1 K/UL (ref 4.8–10.8)

## 2018-11-15 PROCEDURE — 82803 BLOOD GASES ANY COMBINATION: CPT

## 2018-11-15 PROCEDURE — 700105 HCHG RX REV CODE 258: Performed by: EMERGENCY MEDICINE

## 2018-11-15 PROCEDURE — 99284 EMERGENCY DEPT VISIT MOD MDM: CPT

## 2018-11-15 PROCEDURE — 700102 HCHG RX REV CODE 250 W/ 637 OVERRIDE(OP): Performed by: EMERGENCY MEDICINE

## 2018-11-15 PROCEDURE — 82962 GLUCOSE BLOOD TEST: CPT | Mod: 91

## 2018-11-15 PROCEDURE — 36415 COLL VENOUS BLD VENIPUNCTURE: CPT

## 2018-11-15 PROCEDURE — 80053 COMPREHEN METABOLIC PANEL: CPT

## 2018-11-15 PROCEDURE — 96372 THER/PROPH/DIAG INJ SC/IM: CPT

## 2018-11-15 PROCEDURE — 85025 COMPLETE CBC W/AUTO DIFF WBC: CPT

## 2018-11-15 RX ORDER — SODIUM CHLORIDE, SODIUM LACTATE, POTASSIUM CHLORIDE, CALCIUM CHLORIDE 600; 310; 30; 20 MG/100ML; MG/100ML; MG/100ML; MG/100ML
1000 INJECTION, SOLUTION INTRAVENOUS ONCE
Status: COMPLETED | OUTPATIENT
Start: 2018-11-15 | End: 2018-11-15

## 2018-11-15 RX ORDER — SODIUM CHLORIDE 9 MG/ML
1000 INJECTION, SOLUTION INTRAVENOUS ONCE
Status: COMPLETED | OUTPATIENT
Start: 2018-11-15 | End: 2018-11-15

## 2018-11-15 RX ORDER — INSULIN GLARGINE 100 [IU]/ML
30 INJECTION, SOLUTION SUBCUTANEOUS EVERY EVENING
Qty: 9 ML | Refills: 0 | Status: SHIPPED | OUTPATIENT
Start: 2018-11-15 | End: 2018-12-01 | Stop reason: CLARIF

## 2018-11-15 RX ADMIN — SODIUM CHLORIDE, POTASSIUM CHLORIDE, SODIUM LACTATE AND CALCIUM CHLORIDE 1000 ML: 600; 310; 30; 20 INJECTION, SOLUTION INTRAVENOUS at 19:47

## 2018-11-15 RX ADMIN — INSULIN HUMAN 15 UNITS: 100 INJECTION, SOLUTION PARENTERAL at 19:49

## 2018-11-15 RX ADMIN — SODIUM CHLORIDE 1000 ML: 9 INJECTION, SOLUTION INTRAVENOUS at 18:45

## 2018-11-15 ASSESSMENT — LIFESTYLE VARIABLES
CONSUMPTION TOTAL: INCOMPLETE
TOTAL SCORE: 0
HAVE PEOPLE ANNOYED YOU BY CRITICIZING YOUR DRINKING: NO
EVER HAD A DRINK FIRST THING IN THE MORNING TO STEADY YOUR NERVES TO GET RID OF A HANGOVER: NO
HAVE YOU EVER FELT YOU SHOULD CUT DOWN ON YOUR DRINKING: NO
TOTAL SCORE: 0
TOTAL SCORE: 0
DO YOU DRINK ALCOHOL: YES
EVER FELT BAD OR GUILTY ABOUT YOUR DRINKING: NO

## 2018-11-15 NOTE — TELEPHONE ENCOUNTER
Phone number is disconnected and no email is listed in chart.  Will send no show letter about no show to appointment 11/14/18.

## 2018-11-16 NOTE — ED NOTES
Pt resting quietly in room at this time. Police at bedside. Pt calm and cooperative with care at this time.

## 2018-11-16 NOTE — ED NOTES
All lines and monitors discontinued. Discharge instructions given, questions answered.    AMbulated out of ER, escorted by PD.  Instructed not to drive after taking pain medication and pt verbalizes understanding.  Rx x 2 given.

## 2018-11-16 NOTE — ED NOTES
Pt states he tales 30 units of lantus at home and 20 units of Humalog when he eats not the prescribed 3-14 units, and has stopped taking metformin because it was not working.

## 2018-11-16 NOTE — ED PROVIDER NOTES
"ED Provider Note    ER PROVIDER NOTE        CHIEF COMPLAINT  Chief Complaint   Patient presents with   • Hyperglycemia       HPI  Gerber Langston is a 33 y.o. male who presents to the emergency department complaining of hyperglycemia.  Patient has history of insulin controlled diabetes has been out of his medications for 3 weeks.  He was being booked into residential and noted that his blood sugar was high and brought here.  Patient endorses some generalized weakness and fatigue, otherwise no complaints.  No nausea vomiting or abdominal pain, no chest pain difficulty breathing fevers or chills.  No polyuria polydipsia    REVIEW OF SYSTEMS  Pertinent positives include high blood sugar. Pertinent negatives include no abdominal pain. See HPI for details. All other systems reviewed and are negative.    PAST MEDICAL HISTORY   has a past medical history of Diabetes (HCC) and Hepatitis C.    SURGICAL HISTORY   has a past surgical history that includes hernia repair (Left, 4/2016).    FAMILY HISTORY  No family history on file.    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    Constitutional: Alert in no apparent distress.  HENT: No signs of trauma, Bilateral external ears normal, Nose normal.  Mucous membranes dry  Eyes: Pupils are equal and reactive, Conjunctiva normal, Non-icteric.   Neck: Normal range of motion, No tenderness, Supple, No stridor.   Lymphatic: No lymphadenopathy noted.   Cardiovascular: Regular rate and rhythm, no murmurs.   Thorax & Lungs: Normal breath sounds, No respiratory distress, No wheezing, No chest tenderness.   Abdomen: Bowel sounds normal, Soft, No tenderness, No masses, No pulsatile masses. No peritoneal signs.  Skin: Warm, Dry, No erythema, No rash.   Back: No bony tenderness, No CVA tenderness.   Extremities: Intact distal pulses, No edema, No tenderness, No cyanosis, Negative Sue's sign.  Musculoskeletal: Good range of motion in all major joints. No tenderness to palpation or major deformities noted.   Neurologic: Alert , Normal motor function, Normal sensory function, No focal deficits noted.   Psychiatric: Affect normal, Judgment normal, Mood normal.     DIAGNOSTIC STUDIES / PROCEDURES        LABS  Labs Reviewed   BASIC METABOLIC PANEL - Abnormal; Notable for the following:        Result Value    Sodium 133 (*)     Potassium 3.5 (*)     Glucose 425 (*)     Bun 6 (*)     All other components within normal limits   CBC WITH DIFFERENTIAL - Abnormal; Notable for the following:     RBC 4.40 (*)     Hemoglobin 13.5 (*)     Hematocrit 38.8 (*)     All other components within normal limits   VENOUS BLOOD GAS - Abnormal; Notable for the following:     Venous Bg Pco2 34.2 (*)     Venous Bg Po2 66.6 (*)     Venous Bg Hco3 18 (*)     All other components within normal limits   ACCU-CHEK GLUCOSE - Abnormal; Notable for the following:     Glucose - Accu-Ck 329 (*)     All other components within normal limits   ACCU-CHEK GLUCOSE - Abnormal; Notable for the  following:     Glucose - Accu-Ck 178 (*)     All other components within normal limits   ESTIMATED GFR   COMP METABOLIC PANEL       All labs reviewed by me.    RADIOLOGY  No orders to display     The radiologist's interpretation of all radiological studies have been reviewed by me.    COURSE & MEDICAL DECISION MAKING  Nursing notes, ZABRINA MCPHERSONx reviewed in chart.    7:03 PM Patient seen and examined at bedside. Patient will be treated with IV fluids. Ordered for labs to evaluate his symptoms.     HYDRATION: Based on the patient's presentation of Dehydration and Hyperglycemia the patient was given IV fluids. IV Hydration was used because oral hydration was not as rapid as required. Upon recheck following hydration, the patient was improved.     Patient's blood sugar is improving appropriately with hydration as well as reinstitution of his insulin.      Decision Making:  This is a 33 y.o. male presented with hyperglycemia in the setting of medication noncompliance with his diabetes.  There is no evidence of gap acidosis DKA or other complication at this time.  He has improved with treatment here, restarted on insulin, and will provide his home insulin in senior living   The patient will return for new or worsening symptoms and is stable at the time of discharge.    The patient is referred to a primary physician for blood pressure management, diabetic screening, and for all other preventative health concerns.    DISPOSITION:  Patient will be discharged home in stable condition.    FOLLOW UP:  With primary care in 3 days            OUTPATIENT MEDICATIONS:  New Prescriptions    INSULIN GLARGINE (LANTUS) 100 UNIT/ML SOLUTION    Inject 30 Units as instructed every evening for 30 days.    INSULIN LISPRO (HUMALOG) 100 UNIT/ML SOLUTION    Inject 20 Units as instructed 3 times a day before meals for 30 days.         FINAL IMPRESSION  1. Hyperglycemia         The note accurately reflects work and decisions made by me.  Daryl Todd   11/15/2018  10:14 PM

## 2018-11-16 NOTE — ED TRIAGE NOTES
Pt BIB EMS for hyperglycemia. Pt was getting checked into [facility] on the officers serving a warrant and pt had blood sugar of 456 mg/dL. Pt is type I diabetic and has not had his insulin for the past 3 months due to not being able to afford it. Pt calm and cooperative with care at this time.

## 2018-12-01 ENCOUNTER — HOSPITAL ENCOUNTER (OUTPATIENT)
Facility: MEDICAL CENTER | Age: 33
End: 2018-12-01
Attending: EMERGENCY MEDICINE | Admitting: INTERNAL MEDICINE
Payer: MEDICAID

## 2018-12-01 ENCOUNTER — TELEPHONE (OUTPATIENT)
Dept: HEALTH INFORMATION MANAGEMENT | Facility: OTHER | Age: 33
End: 2018-12-01

## 2018-12-01 VITALS
SYSTOLIC BLOOD PRESSURE: 159 MMHG | WEIGHT: 118.39 LBS | TEMPERATURE: 98.4 F | OXYGEN SATURATION: 98 % | RESPIRATION RATE: 20 BRPM | HEART RATE: 89 BPM | HEIGHT: 64 IN | BODY MASS INDEX: 20.21 KG/M2 | DIASTOLIC BLOOD PRESSURE: 95 MMHG

## 2018-12-01 DIAGNOSIS — R73.9 HYPERGLYCEMIA: ICD-10-CM

## 2018-12-01 DIAGNOSIS — E88.89 KETOSIS (HCC): ICD-10-CM

## 2018-12-01 PROBLEM — R30.0 DYSURIA: Status: ACTIVE | Noted: 2018-12-01

## 2018-12-01 LAB
ALBUMIN SERPL BCP-MCNC: 4.5 G/DL (ref 3.2–4.9)
ALBUMIN/GLOB SERPL: 1.4 G/DL
ALP SERPL-CCNC: 132 U/L (ref 30–99)
ALT SERPL-CCNC: 33 U/L (ref 2–50)
ANION GAP SERPL CALC-SCNC: 10 MMOL/L (ref 0–11.9)
APPEARANCE UR: CLEAR
AST SERPL-CCNC: 22 U/L (ref 12–45)
BASE EXCESS BLDV CALC-SCNC: -2 MMOL/L
BASOPHILS # BLD AUTO: 0.6 % (ref 0–1.8)
BASOPHILS # BLD: 0.05 K/UL (ref 0–0.12)
BILIRUB SERPL-MCNC: 0.5 MG/DL (ref 0.1–1.5)
BODY TEMPERATURE: ABNORMAL CENTIGRADE
BUN SERPL-MCNC: 14 MG/DL (ref 8–22)
CALCIUM SERPL-MCNC: 9.4 MG/DL (ref 8.5–10.5)
CHLORIDE SERPL-SCNC: 88 MMOL/L (ref 96–112)
CHOLEST SERPL-MCNC: 170 MG/DL (ref 100–199)
CO2 SERPL-SCNC: 27 MMOL/L (ref 20–33)
COLOR UR AUTO: YELLOW
CREAT SERPL-MCNC: 0.84 MG/DL (ref 0.5–1.4)
EOSINOPHIL # BLD AUTO: 0.12 K/UL (ref 0–0.51)
EOSINOPHIL NFR BLD: 1.4 % (ref 0–6.9)
ERYTHROCYTE [DISTWIDTH] IN BLOOD BY AUTOMATED COUNT: 41.3 FL (ref 35.9–50)
GLOBULIN SER CALC-MCNC: 3.3 G/DL (ref 1.9–3.5)
GLUCOSE BLD-MCNC: 157 MG/DL (ref 65–99)
GLUCOSE BLD-MCNC: 180 MG/DL (ref 65–99)
GLUCOSE BLD-MCNC: 202 MG/DL (ref 65–99)
GLUCOSE BLD-MCNC: 262 MG/DL (ref 65–99)
GLUCOSE BLD-MCNC: 292 MG/DL (ref 65–99)
GLUCOSE BLD-MCNC: 336 MG/DL (ref 65–99)
GLUCOSE BLD-MCNC: 371 MG/DL (ref 65–99)
GLUCOSE BLD-MCNC: >600 MG/DL (ref 65–99)
GLUCOSE SERPL-MCNC: 875 MG/DL (ref 65–99)
GLUCOSE UR QL STRIP.AUTO: >=1000 MG/DL
HCO3 BLDV-SCNC: 25 MMOL/L (ref 24–28)
HCT VFR BLD AUTO: 46.4 % (ref 42–52)
HDLC SERPL-MCNC: 37 MG/DL
HGB BLD-MCNC: 15.8 G/DL (ref 14–18)
IMM GRANULOCYTES # BLD AUTO: 0.07 K/UL (ref 0–0.11)
IMM GRANULOCYTES NFR BLD AUTO: 0.8 % (ref 0–0.9)
KETONES UR QL STRIP.AUTO: ABNORMAL MG/DL
LDLC SERPL CALC-MCNC: ABNORMAL MG/DL
LEUKOCYTE ESTERASE UR QL STRIP.AUTO: NEGATIVE
LYMPHOCYTES # BLD AUTO: 1.92 K/UL (ref 1–4.8)
LYMPHOCYTES NFR BLD: 21.8 % (ref 22–41)
MAGNESIUM SERPL-MCNC: 2 MG/DL (ref 1.5–2.5)
MCH RBC QN AUTO: 30.7 PG (ref 27–33)
MCHC RBC AUTO-ENTMCNC: 34.1 G/DL (ref 33.7–35.3)
MCV RBC AUTO: 90.1 FL (ref 81.4–97.8)
MONOCYTES # BLD AUTO: 0.63 K/UL (ref 0–0.85)
MONOCYTES NFR BLD AUTO: 7.1 % (ref 0–13.4)
NEUTROPHILS # BLD AUTO: 6.03 K/UL (ref 1.82–7.42)
NEUTROPHILS NFR BLD: 68.3 % (ref 44–72)
NITRITE UR QL STRIP.AUTO: NEGATIVE
NRBC # BLD AUTO: 0 K/UL
NRBC BLD-RTO: 0 /100 WBC
PCO2 BLDV: 52.1 MMHG (ref 41–51)
PH BLDV: 7.31 [PH] (ref 7.31–7.45)
PH UR STRIP.AUTO: 5 [PH]
PHOSPHATE SERPL-MCNC: 4.2 MG/DL (ref 2.5–4.5)
PLATELET # BLD AUTO: 285 K/UL (ref 164–446)
PMV BLD AUTO: 11.7 FL (ref 9–12.9)
PO2 BLDV: 16.7 MMHG (ref 25–40)
POTASSIUM SERPL-SCNC: 3.9 MMOL/L (ref 3.6–5.5)
PROT SERPL-MCNC: 7.8 G/DL (ref 6–8.2)
PROT UR QL STRIP: NEGATIVE MG/DL
RBC # BLD AUTO: 5.15 M/UL (ref 4.7–6.1)
RBC UR QL AUTO: NEGATIVE
SAO2 % BLDV: 22.4 %
SODIUM SERPL-SCNC: 125 MMOL/L (ref 135–145)
SP GR UR: <=1.005
TRIGL SERPL-MCNC: 510 MG/DL (ref 0–149)
TSH SERPL DL<=0.005 MIU/L-ACNC: 2.17 UIU/ML (ref 0.38–5.33)
WBC # BLD AUTO: 8.8 K/UL (ref 4.8–10.8)

## 2018-12-01 PROCEDURE — 83036 HEMOGLOBIN GLYCOSYLATED A1C: CPT

## 2018-12-01 PROCEDURE — 700105 HCHG RX REV CODE 258: Performed by: EMERGENCY MEDICINE

## 2018-12-01 PROCEDURE — 99285 EMERGENCY DEPT VISIT HI MDM: CPT

## 2018-12-01 PROCEDURE — 84443 ASSAY THYROID STIM HORMONE: CPT

## 2018-12-01 PROCEDURE — 700111 HCHG RX REV CODE 636 W/ 250 OVERRIDE (IP): Performed by: INTERNAL MEDICINE

## 2018-12-01 PROCEDURE — A9270 NON-COVERED ITEM OR SERVICE: HCPCS | Performed by: INTERNAL MEDICINE

## 2018-12-01 PROCEDURE — 81002 URINALYSIS NONAUTO W/O SCOPE: CPT

## 2018-12-01 PROCEDURE — 700105 HCHG RX REV CODE 258: Performed by: INTERNAL MEDICINE

## 2018-12-01 PROCEDURE — 700102 HCHG RX REV CODE 250 W/ 637 OVERRIDE(OP): Performed by: INTERNAL MEDICINE

## 2018-12-01 PROCEDURE — 84100 ASSAY OF PHOSPHORUS: CPT

## 2018-12-01 PROCEDURE — 80053 COMPREHEN METABOLIC PANEL: CPT

## 2018-12-01 PROCEDURE — 90686 IIV4 VACC NO PRSV 0.5 ML IM: CPT | Performed by: INTERNAL MEDICINE

## 2018-12-01 PROCEDURE — 90471 IMMUNIZATION ADMIN: CPT

## 2018-12-01 PROCEDURE — G0378 HOSPITAL OBSERVATION PER HR: HCPCS

## 2018-12-01 PROCEDURE — 99220 PR INITIAL OBSERVATION CARE,LEVL III: CPT | Performed by: INTERNAL MEDICINE

## 2018-12-01 PROCEDURE — 82962 GLUCOSE BLOOD TEST: CPT | Mod: 91

## 2018-12-01 PROCEDURE — 96372 THER/PROPH/DIAG INJ SC/IM: CPT

## 2018-12-01 PROCEDURE — 36415 COLL VENOUS BLD VENIPUNCTURE: CPT

## 2018-12-01 PROCEDURE — 85025 COMPLETE CBC W/AUTO DIFF WBC: CPT

## 2018-12-01 PROCEDURE — 80061 LIPID PANEL: CPT

## 2018-12-01 PROCEDURE — 83735 ASSAY OF MAGNESIUM: CPT

## 2018-12-01 PROCEDURE — 82803 BLOOD GASES ANY COMBINATION: CPT

## 2018-12-01 RX ORDER — DEXTROSE MONOHYDRATE 25 G/50ML
25 INJECTION, SOLUTION INTRAVENOUS
Status: DISCONTINUED | OUTPATIENT
Start: 2018-12-01 | End: 2018-12-01 | Stop reason: HOSPADM

## 2018-12-01 RX ORDER — INSULIN GLARGINE 100 [IU]/ML
50 INJECTION, SOLUTION SUBCUTANEOUS EVERY EVENING
Status: DISCONTINUED | OUTPATIENT
Start: 2018-12-01 | End: 2018-12-01

## 2018-12-01 RX ORDER — INSULIN GLARGINE 100 [IU]/ML
30 INJECTION, SOLUTION SUBCUTANEOUS
Status: DISCONTINUED | OUTPATIENT
Start: 2018-12-02 | End: 2018-12-01 | Stop reason: HOSPADM

## 2018-12-01 RX ORDER — SODIUM CHLORIDE 9 MG/ML
1000 INJECTION, SOLUTION INTRAVENOUS ONCE
Status: COMPLETED | OUTPATIENT
Start: 2018-12-01 | End: 2018-12-01

## 2018-12-01 RX ORDER — DEXTROSE MONOHYDRATE 25 G/50ML
25 INJECTION, SOLUTION INTRAVENOUS
Status: DISCONTINUED | OUTPATIENT
Start: 2018-12-01 | End: 2018-12-01

## 2018-12-01 RX ORDER — SODIUM CHLORIDE, SODIUM LACTATE, POTASSIUM CHLORIDE, CALCIUM CHLORIDE 600; 310; 30; 20 MG/100ML; MG/100ML; MG/100ML; MG/100ML
INJECTION, SOLUTION INTRAVENOUS CONTINUOUS
Status: DISCONTINUED | OUTPATIENT
Start: 2018-12-01 | End: 2018-12-01 | Stop reason: HOSPADM

## 2018-12-01 RX ORDER — LOVASTATIN 20 MG/1
20 TABLET ORAL EVERY EVENING
Status: DISCONTINUED | OUTPATIENT
Start: 2018-12-01 | End: 2018-12-01 | Stop reason: HOSPADM

## 2018-12-01 RX ADMIN — SODIUM CHLORIDE 1000 ML: 9 INJECTION, SOLUTION INTRAVENOUS at 07:21

## 2018-12-01 RX ADMIN — NICOTINE 7 MG: 7 PATCH, EXTENDED RELEASE TRANSDERMAL at 16:09

## 2018-12-01 RX ADMIN — SODIUM CHLORIDE, POTASSIUM CHLORIDE, SODIUM LACTATE AND CALCIUM CHLORIDE: 600; 310; 30; 20 INJECTION, SOLUTION INTRAVENOUS at 10:10

## 2018-12-01 RX ADMIN — SODIUM CHLORIDE 1000 ML: 9 INJECTION, SOLUTION INTRAVENOUS at 09:06

## 2018-12-01 RX ADMIN — INFLUENZA A VIRUS A/MICHIGAN/45/2015 X-275 (H1N1) ANTIGEN (FORMALDEHYDE INACTIVATED), INFLUENZA A VIRUS A/SINGAPORE/INFIMH-16-0019/2016 IVR-186 (H3N2) ANTIGEN (FORMALDEHYDE INACTIVATED), INFLUENZA B VIRUS B/PHUKET/3073/2013 ANTIGEN (FORMALDEHYDE INACTIVATED), AND INFLUENZA B VIRUS B/MARYLAND/15/2016 BX-69A ANTIGEN (FORMALDEHYDE INACTIVATED) 0.5 ML: 15; 15; 15; 15 INJECTION, SUSPENSION INTRAMUSCULAR at 13:21

## 2018-12-01 RX ADMIN — INSULIN HUMAN 2 UNITS: 100 INJECTION, SOLUTION PARENTERAL at 13:49

## 2018-12-01 RX ADMIN — ASPIRIN 81 MG: 81 TABLET, DELAYED RELEASE ORAL at 11:08

## 2018-12-01 RX ADMIN — INSULIN LISPRO 15 UNITS: 100 INJECTION, SOLUTION INTRAVENOUS; SUBCUTANEOUS at 09:10

## 2018-12-01 ASSESSMENT — ENCOUNTER SYMPTOMS
WHEEZING: 0
ABDOMINAL PAIN: 0
HEMOPTYSIS: 0
DOUBLE VISION: 0
SHORTNESS OF BREATH: 0
BLOOD IN STOOL: 0
VOMITING: 0
CHILLS: 0
PND: 0
FEVER: 0
FLANK PAIN: 0
CONSTIPATION: 0
FALLS: 0
DIZZINESS: 0
WEIGHT LOSS: 1
DIARRHEA: 0
SPUTUM PRODUCTION: 0
NAUSEA: 0
HEADACHES: 0
NECK PAIN: 0
COUGH: 0
DEPRESSION: 0
WEAKNESS: 0
BLURRED VISION: 1
PALPITATIONS: 0
CLAUDICATION: 0
DIAPHORESIS: 0
MYALGIAS: 0
ORTHOPNEA: 0
BACK PAIN: 0
HEARTBURN: 0

## 2018-12-01 ASSESSMENT — LIFESTYLE VARIABLES
TOTAL SCORE: 0
ALCOHOL_USE: YES
CONSUMPTION TOTAL: NEGATIVE
EVER HAD A DRINK FIRST THING IN THE MORNING TO STEADY YOUR NERVES TO GET RID OF A HANGOVER: NO
HAVE YOU EVER FELT YOU SHOULD CUT DOWN ON YOUR DRINKING: NO
HAVE PEOPLE ANNOYED YOU BY CRITICIZING YOUR DRINKING: NO
HOW MANY TIMES IN THE PAST YEAR HAVE YOU HAD 5 OR MORE DRINKS IN A DAY: 0
ON A TYPICAL DAY WHEN YOU DRINK ALCOHOL HOW MANY DRINKS DO YOU HAVE: 2
TOTAL SCORE: 0
EVER FELT BAD OR GUILTY ABOUT YOUR DRINKING: NO
AVERAGE NUMBER OF DAYS PER WEEK YOU HAVE A DRINK CONTAINING ALCOHOL: 1
TOTAL SCORE: 0
EVER_SMOKED: YES

## 2018-12-01 ASSESSMENT — PATIENT HEALTH QUESTIONNAIRE - PHQ9
SUM OF ALL RESPONSES TO PHQ9 QUESTIONS 1 AND 2: 0
2. FEELING DOWN, DEPRESSED, IRRITABLE, OR HOPELESS: NOT AT ALL
1. LITTLE INTEREST OR PLEASURE IN DOING THINGS: NOT AT ALL

## 2018-12-01 ASSESSMENT — PAIN SCALES - GENERAL
PAINLEVEL_OUTOF10: 0

## 2018-12-01 NOTE — PROGRESS NOTES
Stella from Lab called with critical result of blood sugar 875 at 0927. Critical lab result read back to Stella.   Dr. Tineo notified of critical lab result at 0928.  Critical lab result read back by Dr. Tineo. Orders already in place.

## 2018-12-01 NOTE — DISCHARGE PLANNING
Pt states he didn't fill his Rx as the pharmacy wouldn't fill it because 'I don't have a doctor'. EPIC shows that he was a no show to his appt with Primo SANDS.  Pt states 'I didn't know about it'.     CM spoke with Kallie in scheduling and she will reschedule appt. Discussed with pt and SO on where to look on AVS and to reschedule if time doesn't work for him. He was encouraged to get established with a PCP due to his diabetes and told that ER is not the place to get Rx filled. Facesheet updated with correct address and phone numbers.     Prior CM notes indicate issues with insurance covering insulin. Also, pt does not have a glucometer. All of this d/w Anuj SANDS and message left for DM educator to call pt on Monday. Pt states he's willing to attend a class.      Care Transition Team Assessment    Information Source  Orientation : Oriented x 4  Information Given By: Patient  Who is responsible for making decisions for patient? : Patient    Readmission Evaluation  Is this a readmission?: No    Elopement Risk  Legal Hold: No    Interdisciplinary Discharge Planning  Does Admitting Nurse Feel This Could be a Complex Discharge?: No  Primary Care Physician: none  Lives with - Patient's Self Care Capacity: Significant Other  Support Systems: Spouse / Significant Other  Housing / Facility: 2 Story Apartment / Condo  Do You Take your Prescribed Medications Regularly: No  Reasons Why Not Taking Medications : Didn't Refill Prescriptions   Able to Return to Previous ADL's: Yes  Mobility Issues: No  Prior Services: None  Patient Expects to be Discharged to:: home  Assistance Needed: No  Durable Medical Equipment: Not Applicable    Discharge Preparedness  What are your discharge supports?: Other (comment)  Prior Functional Level: Independent with Activities of Daily Living  Difficulity with ADLs: None  Difficulity with IADLs: None    Functional Assesment  Prior Functional Level: Independent with Activities of Daily  Living    Finances  Financial Barriers to Discharge: No  Prescription Coverage: Yes     Advance Directive  Advance Directive?: None    Domestic Abuse  Have you ever been the victim of abuse or violence?: No    Discharge Risks or Barriers  Discharge risks or barriers?: Non-adherence to medication or treatment  Patient risk factors: Multiple ED visits, No PCP, Noncompliance, Uninsured or underinsured    Anticipated Discharge Information  Anticipated discharge disposition: Home  Discharge Address: facesheet verified  Discharge Contact Phone Number: fs verified

## 2018-12-01 NOTE — PROGRESS NOTES
FSBS still reading >600, 15 units insulin lispro given per MAR, IV fluid bolus running. q1 hour accuchecks in place

## 2018-12-01 NOTE — ED NOTES
Agree with Triage RN.  Pt states polydipsia and polyuria.  Pt states cont blurry vision x several weeks.  Pt blood sent to lab, chart up for ERP.

## 2018-12-01 NOTE — DISCHARGE PLANNING
Per [facility], Rx should be written as follows:    Glucometer, test strips and lancets 'per formulary' and to include directions on use and how many day supply.    Basaglar Rx to include 10 needles and Novolin R to include syringes.

## 2018-12-01 NOTE — CARE PLAN
Problem: Medication  Goal: Compliance with prescribed medication will improve  Outcome: PROGRESSING AS EXPECTED      Problem: GLYCEMIA IMBALANCE  Goal: Clinical indication of glycemia balance is achieved  Outcome: PROGRESSING AS EXPECTED  FSBS 371 at this time, >600 in ED

## 2018-12-01 NOTE — ED PROVIDER NOTES
"ED Provider Note    CHIEF COMPLAINT  Chief Complaint   Patient presents with   • Hyperglycemia       HPI  Gerber Langston is a 33 y.o. male who presents complaining of polyuria, polydipsia.  He has had this for about 2 weeks.  He is feeling progressively worse.  He feels generally unwell.  He also has had some blurry vision.  He is supposed to be on insulin, having been diagnosed with insulin dependent diabetes last year.  However he is not been taking this for the last 2 weeks.  He did have a loose stool today, otherwise no bowel changes.  No fever.  No belly pain or chest pain.  No shortness of breath.  No cough or cold symptoms.  There is no other complaint.    PAST MEDICAL HISTORY  Past Medical History:   Diagnosis Date   • Diabetes (HCC)     type 1   • Hepatitis C        FAMILY HISTORY  History reviewed. No pertinent family history.    SOCIAL HISTORY  Social History   Substance Use Topics   • Smoking status: Current Every Day Smoker     Packs/day: 0.25     Types: Cigarettes   • Smokeless tobacco: Never Used   • Alcohol use Yes      Comment: socially         SURGICAL HISTORY  Past Surgical History:   Procedure Laterality Date   • HERNIA REPAIR Left 4/2016    inguinal       CURRENT MEDICATIONS  Home Medications     Reviewed by Susan Martinez (Pharmacy Tech) on 12/01/18 at 0753  Med List Status: Complete   Medication Last Dose Status        Patient Ajay Taking any Medications                       I have reviewed the nurses notes and/or the list brought with the patient.    ALLERGIES  No Known Allergies    REVIEW OF SYSTEMS  See HPI for further details. Review of systems as above, otherwise all other systems are negative.     PHYSICAL EXAM  VITAL SIGNS: /79   Pulse 77   Temp 36.4 °C (97.6 °F) (Temporal)   Resp 16   Ht 1.626 m (5' 4\")   Wt 53.7 kg (118 lb 6.2 oz)   SpO2 99%   BMI 20.32 kg/m²     Constitutional: Well appearing patient in no acute distress.  Not toxic, nor ill in " appearance.  HENT: Mucus membranes moist.  Oropharynx is clear.  Eyes: Pupils equally round.  No scleral icterus.   Neck: Full nontender range of motion.  Lymphatic: No cervical lymphadenopathy noted.   Cardiovascular: Regular heart rate and rhythm.  No murmurs, rubs, nor gallop appreciated.   Thorax & Lungs: Chest is nontender.  Lungs are clear to auscultation with good air movement bilaterally.  No wheeze, rhonchi, nor rales.   Abdomen: Soft, with no tenderness, rebound nor guarding.  No mass, pulsatile mass, nor hepatosplenomegaly appreciated.  Skin: No purpura nor petechia noted.  Extremities/Musculoskeletal: No sign of trauma.  Calves are nontender with no cords nor edema.  No Sue's sign.  Pulses are intact all around.   Neurologic: Alert & oriented.  Strength and sensation is intact all around.  Gait is normal.  Psychiatric: Normal affect appropriate for the clinical situation.      LABS  Labs Reviewed   CBC WITH DIFFERENTIAL - Abnormal; Notable for the following:        Result Value    Lymphocytes 21.80 (*)     All other components within normal limits   VENOUS BLOOD GAS - Abnormal; Notable for the following:     Venous Bg Pco2 52.1 (*)     Venous Bg Po2 16.7 (*)     All other components within normal limits   ACCU-CHEK GLUCOSE - Abnormal; Notable for the following:     Glucose - Accu-Ck >600 (*)     All other components within normal limits   POC UA - Abnormal; Notable for the following:     POC Glucose >=1000 (*)     POC Ketones Trace (*)     POC Specific Gravity <=1.005 (*)     All other components within normal limits   COMP METABOLIC PANEL   POC URINALYSIS   POC URINALYSIS       MEDICAL RECORD  I have reviewed patient's medical record and pertinent results are listed above.    COURSE & MEDICAL DECISION MAKING  I have reviewed any medical record information, laboratory studies and radiographic results as noted above.  This patient presents with polyuria, polydipsia, blurry vision.  He has hypoglycemia  in the setting of insulin-dependent diabetes not taking insulin.  He has some trace ketones in his urine.  His venous gas does not show a profound acidosis.  However, I do suspect that there is a mild element of ketosis.  For this reason, he was ordered to have an IV fluid bolus.  He is not a candidate for p.o. rehydration.  Upon recheck is somewhat improved.  I discussed the patient's case with Dr. Tineo, hospitalist, will be putting him him in for correction of his ketosis and his hyperglycemia.  I have cautioned the patient no uncertain terms the importance of taking his insulin.      FINAL IMPRESSION  1. Hyperglycemia    2. Ketosis (HCC)           This dictation was created using voice recognition software.    Electronically signed by: Khanh Talamantes, 12/1/2018 8:34 AM

## 2018-12-01 NOTE — ED TRIAGE NOTES
"Pt ambulatory to triage reports taking no insulin since last visit on 11-15-18. Pt states he is unable to get his prescription filled. Pt states,  \"my insurance won't pay for it.\" Pt c/o nausea, denies vomiting, diarrhea x 1. Denies abd discomfort. FSBG reads \"HI\" in triage. Pt c/o painful urination x 2 weeks. Charge RN informed of pt. Pt to room 15 by ED tech.   "

## 2018-12-01 NOTE — PROGRESS NOTES
Pt arrived to unit via WC with tech and RN at 0820. Ambulatory to BR and back with SBA, steady gait noted. Pt oriented to room, unit, and plan of care. All questions answered at this time. Call light within reach, fall precautions in place, will continue to monitor. MD notified of pt arrival

## 2018-12-01 NOTE — H&P
Hospital Medicine History & Physical Note    Date of Service  12/1/2018    Primary Care Physician  Pcp Pt States None    Consultants  None    Code Status  FULL CODE     Chief Complaint  Hyperglycemia    History of Presenting Illness  33 y.o. male who presented 12/1/2018 with Hyperglycemia    Recent diagnosis of type I DM, unable to fill medications because of cost per patient and insurance not filling them. Has been having urinary frequency, polydypsia, visual blurring. Presenting to the ED and found to have profound hyperglycemia. IDDM at this time. Lethargy and fatigue with these symptoms. No other complaints at this time.     Review of Systems  Review of Systems   Constitutional: Positive for malaise/fatigue and weight loss. Negative for chills, diaphoresis and fever.   HENT: Negative for hearing loss and tinnitus.    Eyes: Positive for blurred vision. Negative for double vision.   Respiratory: Negative for cough, hemoptysis, sputum production, shortness of breath and wheezing.    Cardiovascular: Negative for chest pain, palpitations, orthopnea, claudication, leg swelling and PND.   Gastrointestinal: Negative for abdominal pain, blood in stool, constipation, diarrhea, heartburn, melena, nausea and vomiting.   Genitourinary: Negative for dysuria, flank pain, frequency, hematuria and urgency.   Musculoskeletal: Negative for back pain, falls, joint pain, myalgias and neck pain.   Skin: Negative for itching and rash.   Neurological: Negative for dizziness, weakness and headaches.   Psychiatric/Behavioral: Negative for depression and suicidal ideas.       Past Medical History   has a past medical history of Diabetes (HCC) and Hepatitis C.    Surgical History   has a past surgical history that includes hernia repair (Left, 4/2016).     Family History  Negative     Social History   reports that he has been smoking Cigarettes.  He has been smoking about 0.25 packs per day. He has never used smokeless tobacco. He reports  that he drinks alcohol. He reports that he does not use drugs.    Allergies  No Known Allergies    Medications  None       Physical Exam  Temp:  [36 °C (96.8 °F)-36.4 °C (97.6 °F)] 36.4 °C (97.6 °F)  Pulse:  [] 77  Resp:  [16] 16  BP: (118-133)/(79-87) 118/79    Physical Exam   Constitutional: He is oriented to person, place, and time. He appears well-developed and well-nourished. No distress.   HENT:   Head: Normocephalic.   Mouth/Throat: Oropharynx is clear and moist. No oropharyngeal exudate.   Eyes: Pupils are equal, round, and reactive to light. Conjunctivae and EOM are normal. No scleral icterus.   Neck: Normal range of motion. No JVD present. No thyromegaly present.   Cardiovascular: Normal rate, regular rhythm and normal heart sounds.  Exam reveals no gallop and no friction rub.    No murmur heard.  Pulses:       Posterior tibial pulses are 2+ on the right side, and 2+ on the left side.   Cap refill < 3s   Pulmonary/Chest: No stridor. No respiratory distress. He has no wheezes. He has no rales. He exhibits no tenderness.   Abdominal: Soft. Bowel sounds are normal. He exhibits no distension. There is no tenderness. There is no rebound and no guarding.   Musculoskeletal: He exhibits no edema, tenderness or deformity.   Lymphadenopathy:     He has no cervical adenopathy.   Neurological: He is alert and oriented to person, place, and time. He has normal reflexes. No cranial nerve deficit.   Skin: Skin is warm and dry. He is not diaphoretic.   Psychiatric: He has a normal mood and affect. His behavior is normal. Judgment and thought content normal.       Laboratory:  Recent Labs      12/01/18   0649   WBC  8.8   RBC  5.15   HEMOGLOBIN  15.8   HEMATOCRIT  46.4   MCV  90.1   MCH  30.7   MCHC  34.1   RDW  41.3   PLATELETCT  285   MPV  11.7     Recent Labs      12/01/18   0649   SODIUM  125*   POTASSIUM  3.9   CHLORIDE  88*   CO2  27   GLUCOSE  875*   BUN  14   CREATININE  0.84   CALCIUM  9.4     Recent Labs       12/01/18   0649   ALTSGPT  33   ASTSGOT  22   ALKPHOSPHAT  132*   TBILIRUBIN  0.5   GLUCOSE  875*                 No results for input(s): TROPONINI in the last 72 hours.    Urinalysis:    No results found     Imaging:  No orders to display         Assessment/Plan:  I anticipate this patient is appropriate for observation status at this time.    Type 1 diabetes mellitus without complication (HCC)- (present on admission)   Assessment & Plan    Failure to fill medication  Presenting with profound hyperglycemia   Admit to CDU   Discussed with pharmacy (Will need basaglar / regular insulin on discharge)     Start Lantus / Meal time insulin and SSI     Titrate to achieve normoglycemia control     ASA / Statin    Check A1C / Lipid profile      Dysuria- (present on admission)   Assessment & Plan    Will check UA     Hyponatremia- (present on admission)   Assessment & Plan    Pseudohyponatremia from hyperglycemia  Improve glycemic control          VTE prophylaxis: SCDs

## 2018-12-01 NOTE — ED NOTES
Med Rec completed per patient  Allergies reviewed  No ORAL antibiotics in last 30 days    Pt states that he has not taken/filled any of his medications. Last doses were the last time he was in the hospital.

## 2018-12-02 LAB
EST. AVERAGE GLUCOSE BLD GHB EST-MCNC: 424 MG/DL
HBA1C MFR BLD: 16.4 % (ref 0–5.6)

## 2018-12-02 NOTE — DISCHARGE SUMMARY
Discharge Summary    CHIEF COMPLAINT ON ADMISSION  Chief Complaint   Patient presents with   • Hyperglycemia       Reason for Admission  Diabetic Problem     Admission Date  12/1/2018    CODE STATUS  Prior    HPI & HOSPITAL COURSE  This is a 33 y.o. male here with Hyperglycemia.     Type I DM, non compliant behavior. Previous erratic history of Insulin filling at the pharmacy. Now presented with hyperglycemia, which improved during the hospital stay with medical management. Case was discussed with his pharmacy too and he should have coverage for Basaglar / Insulin regular. With improvement in his blood sugars patient wanted to be discharged 12/1/2018 within hours of hospital admission. At this time patient was informed that an accurate glycemic control with insulin could not be recommended by me given risk of hypoglycemia with aggressive management and hyperglycemia if not appropriately managed. Advised to stay overnight and discussed issues with significant hyponatremia on presentation / risk of DKA. After risks of leaving AMA discussed, benefits of hospitalization discussed patient decided to stay but once I left for the day patient decided to leave AMA irrespectively on 12/01/2018.          DISCHARGE DIAGNOSES  Active Problems:    Type 1 diabetes mellitus without complication (HCC) POA: Yes    Hyponatremia POA: Yes    Dysuria POA: Yes  Resolved Problems:    * No resolved hospital problems. *      FOLLOW UP  Future Appointments  Date Time Provider Department Center   12/4/2018 2:15 PM Vladimir Willingham M.D. [facility] None       LABORATORY  Lab Results   Component Value Date    SODIUM 125 (L) 12/01/2018    POTASSIUM 3.9 12/01/2018    CHLORIDE 88 (L) 12/01/2018    CO2 27 12/01/2018    GLUCOSE 875 (HH) 12/01/2018    BUN 14 12/01/2018    CREATININE 0.84 12/01/2018        Lab Results   Component Value Date    WBC 8.8 12/01/2018    HEMOGLOBIN 15.8 12/01/2018    HEMATOCRIT 46.4 12/01/2018    PLATELETCT 285 12/01/2018

## 2018-12-02 NOTE — ASSESSMENT & PLAN NOTE
Failure to fill medication  Presenting with profound hyperglycemia   Admit to CDU   Discussed with pharmacy (Will need basaglar / regular insulin on discharge)     Start Lantus / Meal time insulin and SSI     Titrate to achieve normoglycemia control     ASA / Statin    Check A1C / Lipid profile

## 2018-12-03 ENCOUNTER — HOSPITAL ENCOUNTER (EMERGENCY)
Facility: MEDICAL CENTER | Age: 33
End: 2018-12-03
Attending: EMERGENCY MEDICINE
Payer: MEDICAID

## 2018-12-03 VITALS
TEMPERATURE: 97.8 F | SYSTOLIC BLOOD PRESSURE: 105 MMHG | WEIGHT: 118 LBS | DIASTOLIC BLOOD PRESSURE: 72 MMHG | RESPIRATION RATE: 16 BRPM | HEIGHT: 64 IN | BODY MASS INDEX: 20.14 KG/M2 | HEART RATE: 89 BPM

## 2018-12-03 LAB
ALBUMIN SERPL BCP-MCNC: 3.8 G/DL (ref 3.2–4.9)
ALBUMIN/GLOB SERPL: 1.3 G/DL
ALP SERPL-CCNC: 113 U/L (ref 30–99)
ALT SERPL-CCNC: 35 U/L (ref 2–50)
ANION GAP SERPL CALC-SCNC: 14 MMOL/L (ref 0–11.9)
APPEARANCE UR: CLEAR
AST SERPL-CCNC: 18 U/L (ref 12–45)
B-OH-BUTYR SERPL-MCNC: 1.64 MMOL/L (ref 0.02–0.27)
BASOPHILS # BLD AUTO: 0.5 % (ref 0–1.8)
BASOPHILS # BLD: 0.04 K/UL (ref 0–0.12)
BILIRUB SERPL-MCNC: 0.3 MG/DL (ref 0.1–1.5)
BILIRUB UR QL STRIP.AUTO: NEGATIVE
BUN SERPL-MCNC: 15 MG/DL (ref 8–22)
CALCIUM SERPL-MCNC: 9 MG/DL (ref 8.5–10.5)
CHLORIDE SERPL-SCNC: 102 MMOL/L (ref 96–112)
CO2 SERPL-SCNC: 21 MMOL/L (ref 20–33)
COLOR UR: YELLOW
CREAT SERPL-MCNC: 0.85 MG/DL (ref 0.5–1.4)
EOSINOPHIL # BLD AUTO: 0.13 K/UL (ref 0–0.51)
EOSINOPHIL NFR BLD: 1.7 % (ref 0–6.9)
ERYTHROCYTE [DISTWIDTH] IN BLOOD BY AUTOMATED COUNT: 40.6 FL (ref 35.9–50)
GLOBULIN SER CALC-MCNC: 2.9 G/DL (ref 1.9–3.5)
GLUCOSE BLD-MCNC: 229 MG/DL (ref 65–99)
GLUCOSE SERPL-MCNC: 418 MG/DL (ref 65–99)
GLUCOSE UR STRIP.AUTO-MCNC: >=1000 MG/DL
HCT VFR BLD AUTO: 37.3 % (ref 42–52)
HGB BLD-MCNC: 13 G/DL (ref 14–18)
IMM GRANULOCYTES # BLD AUTO: 0.03 K/UL (ref 0–0.11)
IMM GRANULOCYTES NFR BLD AUTO: 0.4 % (ref 0–0.9)
KETONES UR STRIP.AUTO-MCNC: 40 MG/DL
LEUKOCYTE ESTERASE UR QL STRIP.AUTO: NEGATIVE
LYMPHOCYTES # BLD AUTO: 1.93 K/UL (ref 1–4.8)
LYMPHOCYTES NFR BLD: 25.1 % (ref 22–41)
MCH RBC QN AUTO: 30.7 PG (ref 27–33)
MCHC RBC AUTO-ENTMCNC: 34.9 G/DL (ref 33.7–35.3)
MCV RBC AUTO: 88 FL (ref 81.4–97.8)
MICRO URNS: ABNORMAL
MONOCYTES # BLD AUTO: 0.61 K/UL (ref 0–0.85)
MONOCYTES NFR BLD AUTO: 7.9 % (ref 0–13.4)
NEUTROPHILS # BLD AUTO: 4.94 K/UL (ref 1.82–7.42)
NEUTROPHILS NFR BLD: 64.4 % (ref 44–72)
NITRITE UR QL STRIP.AUTO: NEGATIVE
NRBC # BLD AUTO: 0 K/UL
NRBC BLD-RTO: 0 /100 WBC
PH UR STRIP.AUTO: 5 [PH]
PLATELET # BLD AUTO: 261 K/UL (ref 164–446)
PMV BLD AUTO: 11 FL (ref 9–12.9)
POTASSIUM SERPL-SCNC: 4.1 MMOL/L (ref 3.6–5.5)
PROT SERPL-MCNC: 6.7 G/DL (ref 6–8.2)
PROT UR QL STRIP: NEGATIVE MG/DL
RBC # BLD AUTO: 4.24 M/UL (ref 4.7–6.1)
RBC UR QL AUTO: NEGATIVE
SODIUM SERPL-SCNC: 137 MMOL/L (ref 135–145)
SP GR UR STRIP.AUTO: 1.04
UROBILINOGEN UR STRIP.AUTO-MCNC: 0.2 MG/DL
WBC # BLD AUTO: 7.7 K/UL (ref 4.8–10.8)

## 2018-12-03 PROCEDURE — 700102 HCHG RX REV CODE 250 W/ 637 OVERRIDE(OP)

## 2018-12-03 PROCEDURE — 700105 HCHG RX REV CODE 258: Performed by: EMERGENCY MEDICINE

## 2018-12-03 PROCEDURE — 36415 COLL VENOUS BLD VENIPUNCTURE: CPT

## 2018-12-03 PROCEDURE — 81003 URINALYSIS AUTO W/O SCOPE: CPT

## 2018-12-03 PROCEDURE — A9270 NON-COVERED ITEM OR SERVICE: HCPCS

## 2018-12-03 PROCEDURE — 99284 EMERGENCY DEPT VISIT MOD MDM: CPT

## 2018-12-03 PROCEDURE — 82010 KETONE BODYS QUAN: CPT

## 2018-12-03 PROCEDURE — 80053 COMPREHEN METABOLIC PANEL: CPT

## 2018-12-03 PROCEDURE — 82962 GLUCOSE BLOOD TEST: CPT

## 2018-12-03 PROCEDURE — 96374 THER/PROPH/DIAG INJ IV PUSH: CPT

## 2018-12-03 PROCEDURE — 700102 HCHG RX REV CODE 250 W/ 637 OVERRIDE(OP): Performed by: EMERGENCY MEDICINE

## 2018-12-03 PROCEDURE — 85025 COMPLETE CBC W/AUTO DIFF WBC: CPT

## 2018-12-03 RX ORDER — NICOTINE 21 MG/24HR
1 PATCH, TRANSDERMAL 24 HOURS TRANSDERMAL ONCE
Status: DISCONTINUED | OUTPATIENT
Start: 2018-12-03 | End: 2018-12-03 | Stop reason: HOSPADM

## 2018-12-03 RX ORDER — SODIUM CHLORIDE 9 MG/ML
1000 INJECTION, SOLUTION INTRAVENOUS ONCE
Status: COMPLETED | OUTPATIENT
Start: 2018-12-03 | End: 2018-12-03

## 2018-12-03 RX ADMIN — INSULIN HUMAN 5 UNITS: 100 INJECTION, SOLUTION PARENTERAL at 18:16

## 2018-12-03 RX ADMIN — NICOTINE 1 PATCH: 21 PATCH, EXTENDED RELEASE TRANSDERMAL at 19:19

## 2018-12-03 RX ADMIN — SODIUM CHLORIDE 1000 ML: 9 INJECTION, SOLUTION INTRAVENOUS at 17:15

## 2018-12-03 ASSESSMENT — LIFESTYLE VARIABLES: DO YOU DRINK ALCOHOL: NO

## 2018-12-04 NOTE — ED NOTES
Pt. Ambulatory to the bathroom with steady gait. Pt. Educated there is no smoking in the hospital. Pt. Verbalized understanding and agreed he would not smoke in the bathroom.

## 2018-12-04 NOTE — PROGRESS NOTES
Asked to evaluate and admit the patient by Dr. Sidney Friedman, patient was not in his bed.  Apparently the patient left AGAINST MEDICAL ADVICE.

## 2018-12-04 NOTE — ED NOTES
Police leaving at this time, requesting to be notified when pt up for discharge so they can come back. [phone]    Pt aware of POC for admission. Pt medicated per MAR. NAD noted.

## 2018-12-04 NOTE — ED PROVIDER NOTES
ED Provider Note    CHIEF COMPLAINT  Chief Complaint   Patient presents with   • High Blood Sugar   • Medical Clearance       HPI  Gerber Langston is a 33 y.o. male who presents for evaluation of elevated blood sugar.  The patient is brought in by police for medical clearance.  He was seen at this facility 2 days ago admitted for profound hyperglycemia without acidosis.  He ultimately left the hospital AGAINST MEDICAL ADVICE.  He has a history of noncompliance.  He is under a police hold currently.  He has not been taking any anti-hyperglycemics or insulin.  He has not reported any high fevers or chills.  No numbness weakness or tingling    REVIEW OF SYSTEMS  See HPI for further details.  No night sweats weight loss numbness tingling weakness rash all other systems are negative.     PAST MEDICAL HISTORY  Past Medical History:   Diagnosis Date   • Diabetes (HCC)     type 1   • Hepatitis C        FAMILY HISTORY  Noncontributory    SOCIAL HISTORY  Social History     Social History   • Marital status: Single     Spouse name: N/A   • Number of children: N/A   • Years of education: N/A     Social History Main Topics   • Smoking status: Current Every Day Smoker     Packs/day: 0.25     Types: Cigarettes   • Smokeless tobacco: Never Used   • Alcohol use Yes      Comment: socially   • Drug use: No      Comment: hx of meth use, denies use currently   • Sexual activity: Not on file     Other Topics Concern   • Not on file     Social History Narrative   • No narrative on file     Smoke cigarettes history of polysubstance abuse  SURGICAL HISTORY  Past Surgical History:   Procedure Laterality Date   • HERNIA REPAIR Left 4/2016    inguinal       CURRENT MEDICATIONS  Home Medications     Reviewed by Justin Perez R.N. (Registered Nurse) on 12/03/18 at 1618  Med List Status: Partial   Medication Last Dose Status        Patient Ajay Taking any Medications                   Noncompliant with all medications    ALLERGIES  No Known  "Allergies    PHYSICAL EXAM  VITAL SIGNS: /66   Pulse 98   Temp 36.6 °C (97.8 °F)   Resp 18   Ht 1.626 m (5' 4\")   Wt 53.5 kg (118 lb)   BMI 20.25 kg/m²  Room air O2: 95    Constitutional: Well developed, Well nourished, No acute distress, Non-toxic appearance.   HENT: Normocephalic, Atraumatic, Bilateral external ears normal, dry mucous membranes, No oral exudates, Nose normal.   Eyes: PERRLA, EOMI, Conjunctiva normal, No discharge.   Neck: Normal range of motion, No tenderness, Supple, No stridor.   Cardiovascular: Normal heart rate, Normal rhythm, No murmurs, No rubs, No gallops.   Thorax & Lungs: Normal breath sounds, No respiratory distress, No wheezing, No chest tenderness.   Abdomen: Bowel sounds normal, Soft, No tenderness, No masses, No pulsatile masses.   Skin: Warm, Dry, No erythema, No rash.   Back: No tenderness, No CVA tenderness.   Extremities: Intact distal pulses, No edema, No tenderness, No cyanosis, No clubbing.   Musculoskeletal: Good range of motion in all major joints. No tenderness to palpation or major deformities noted.   Neurologic: Alert & oriented x 3, Normal motor function, Normal sensory function, No focal deficits noted.   Psychiatric: Affect normal, Judgment normal, Mood normal.       RADIOLOGY/PROCEDURES  Results for orders placed or performed during the hospital encounter of 12/03/18   CBC WITH DIFFERENTIAL   Result Value Ref Range    WBC 7.7 4.8 - 10.8 K/uL    RBC 4.24 (L) 4.70 - 6.10 M/uL    Hemoglobin 13.0 (L) 14.0 - 18.0 g/dL    Hematocrit 37.3 (L) 42.0 - 52.0 %    MCV 88.0 81.4 - 97.8 fL    MCH 30.7 27.0 - 33.0 pg    MCHC 34.9 33.7 - 35.3 g/dL    RDW 40.6 35.9 - 50.0 fL    Platelet Count 261 164 - 446 K/uL    MPV 11.0 9.0 - 12.9 fL    Neutrophils-Polys 64.40 44.00 - 72.00 %    Lymphocytes 25.10 22.00 - 41.00 %    Monocytes 7.90 0.00 - 13.40 %    Eosinophils 1.70 0.00 - 6.90 %    Basophils 0.50 0.00 - 1.80 %    Immature Granulocytes 0.40 0.00 - 0.90 %    Nucleated RBC " 0.00 /100 WBC    Neutrophils (Absolute) 4.94 1.82 - 7.42 K/uL    Lymphs (Absolute) 1.93 1.00 - 4.80 K/uL    Monos (Absolute) 0.61 0.00 - 0.85 K/uL    Eos (Absolute) 0.13 0.00 - 0.51 K/uL    Baso (Absolute) 0.04 0.00 - 0.12 K/uL    Immature Granulocytes (abs) 0.03 0.00 - 0.11 K/uL    NRBC (Absolute) 0.00 K/uL   COMP METABOLIC PANEL   Result Value Ref Range    Sodium 137 135 - 145 mmol/L    Potassium 4.1 3.6 - 5.5 mmol/L    Chloride 102 96 - 112 mmol/L    Co2 21 20 - 33 mmol/L    Anion Gap 14.0 (H) 0.0 - 11.9    Glucose 418 (H) 65 - 99 mg/dL    Bun 15 8 - 22 mg/dL    Creatinine 0.85 0.50 - 1.40 mg/dL    Calcium 9.0 8.5 - 10.5 mg/dL    AST(SGOT) 18 12 - 45 U/L    ALT(SGPT) 35 2 - 50 U/L    Alkaline Phosphatase 113 (H) 30 - 99 U/L    Total Bilirubin 0.3 0.1 - 1.5 mg/dL    Albumin 3.8 3.2 - 4.9 g/dL    Total Protein 6.7 6.0 - 8.2 g/dL    Globulin 2.9 1.9 - 3.5 g/dL    A-G Ratio 1.3 g/dL   BETA-HYDROXYBUTYRIC ACID   Result Value Ref Range    beta-Hydroxybutyric Acid 1.64 (H) 0.02 - 0.27 mmol/L   URINALYSIS CULTURE, IF INDICATED   Result Value Ref Range    Color Yellow     Character Clear     Specific Gravity 1.041 <1.035    Ph 5.0 5.0 - 8.0    Glucose >=1000 (A) Negative mg/dL    Ketones 40 (A) Negative mg/dL    Protein Negative Negative mg/dL    Bilirubin Negative Negative    Urobilinogen, Urine 0.2 Negative    Nitrite Negative Negative    Leukocyte Esterase Negative Negative    Occult Blood Negative Negative    Micro Urine Req see below    ESTIMATED GFR   Result Value Ref Range    GFR If African American >60 >60 mL/min/1.73 m 2    GFR If Non African American >60 >60 mL/min/1.73 m 2         COURSE & MEDICAL DECISION MAKING  Pertinent Labs & Imaging studies reviewed. (See chart for details)  Patient was clinically dehydrated.  He was significantly nauseous and with his significantly elevated blood sugar he was given IV fluids.  After IV fluid administration his heart rate came down and he started to make urine and he felt  better.  Here the patient has significant hyperglycemia and very mild acidosis but no suggestion of severe diabetic ketoacidosis.  He was given IV fluids as well as IV insulin.  With his noncompliance and evidence of early acidosis I recommended admission for glucose control, diabetic training and to reinitiate insulin therapy.  Of note the patient came in with police but they will site and release him and he will not require ongoing please involvement while hospitalized.    FINAL IMPRESSION  1.  Hyperglycemia with mild acidosis    Admission      Electronically signed by: Sidney Friedman, 12/3/2018 5:46 PM

## 2018-12-04 NOTE — ED NOTES
"Pt. Self-removed IV catheter. Pt self-placed band aids over IV site. IV has been successfully removed and discarded in unknown location, but is no longer present in pt's arm as verified by this RN. Pt. Then stated he was leaving and did not want to stay stating \"you can't hold me you have nothing on me.\" Police no longer present with this pt. And have already left per Day Shift RN. Pt walked out with steady gait. Number for  called per earlier progress note by Justin HARE and notified of pt. eloping.  "

## 2018-12-04 NOTE — ED NOTES
Pt was found wandering the halls of the ER trying to find an exit to smoke. Pt brought back to bed, upset that he couldn't go outside to smoke. ERP notified of pt desire for nicotine patch, order placed. Pt medicated. BS rechecked. Report to Jessica HARE.

## 2018-12-04 NOTE — ED NOTES
Pt aware of POC. Pt to and from restroom with steady gait. Urine collected and sent to lab. Blood drawn and sent to lab. NS bolus initiated.

## 2018-12-04 NOTE — ED TRIAGE NOTES
"Chief Complaint   Patient presents with   • High Blood Sugar   • Medical Clearance     BIB law enforcement for above. BS at the longterm was 571, received 500cc NS PTA then had a BS of 428. Pt is a type 1 diabetic and hasn't taken meds in months.     /66   Pulse 98   Temp 36.6 °C (97.8 °F)   Resp 18   Ht 1.626 m (5' 4\")   Wt 53.5 kg (118 lb)   BMI 20.25 kg/m²     "

## 2018-12-04 NOTE — ED NOTES
Pt denies taking medications   Med rec updated and complete.  Allergies reviewed.  No antibiotic use in last 30 days at home.

## 2019-04-06 ENCOUNTER — APPOINTMENT (OUTPATIENT)
Dept: RADIOLOGY | Facility: MEDICAL CENTER | Age: 34
End: 2019-04-06
Attending: EMERGENCY MEDICINE
Payer: MEDICAID

## 2019-04-06 ENCOUNTER — HOSPITAL ENCOUNTER (EMERGENCY)
Facility: MEDICAL CENTER | Age: 34
End: 2019-04-06
Attending: EMERGENCY MEDICINE
Payer: MEDICAID

## 2019-04-06 VITALS
TEMPERATURE: 97.9 F | RESPIRATION RATE: 14 BRPM | HEART RATE: 83 BPM | SYSTOLIC BLOOD PRESSURE: 136 MMHG | DIASTOLIC BLOOD PRESSURE: 87 MMHG | OXYGEN SATURATION: 96 % | BODY MASS INDEX: 23.71 KG/M2 | HEIGHT: 64 IN | WEIGHT: 138.89 LBS

## 2019-04-06 DIAGNOSIS — R73.9 HYPERGLYCEMIA: ICD-10-CM

## 2019-04-06 DIAGNOSIS — N50.819 TESTICULAR PAIN: ICD-10-CM

## 2019-04-06 LAB
ALBUMIN SERPL BCP-MCNC: 3.9 G/DL (ref 3.2–4.9)
ALBUMIN/GLOB SERPL: 1.3 G/DL
ALP SERPL-CCNC: 87 U/L (ref 30–99)
ALT SERPL-CCNC: 17 U/L (ref 2–50)
ANION GAP SERPL CALC-SCNC: 6 MMOL/L (ref 0–11.9)
APPEARANCE UR: CLEAR
AST SERPL-CCNC: 13 U/L (ref 12–45)
BASOPHILS # BLD AUTO: 0.5 % (ref 0–1.8)
BASOPHILS # BLD: 0.05 K/UL (ref 0–0.12)
BILIRUB SERPL-MCNC: 0.3 MG/DL (ref 0.1–1.5)
BILIRUB UR QL STRIP.AUTO: NEGATIVE
BUN SERPL-MCNC: 10 MG/DL (ref 8–22)
C TRACH DNA SPEC QL NAA+PROBE: NEGATIVE
CALCIUM SERPL-MCNC: 8.8 MG/DL (ref 8.5–10.5)
CHLORIDE SERPL-SCNC: 105 MMOL/L (ref 96–112)
CO2 SERPL-SCNC: 28 MMOL/L (ref 20–33)
COLOR UR: YELLOW
CREAT SERPL-MCNC: 0.74 MG/DL (ref 0.5–1.4)
EOSINOPHIL # BLD AUTO: 0.14 K/UL (ref 0–0.51)
EOSINOPHIL NFR BLD: 1.3 % (ref 0–6.9)
ERYTHROCYTE [DISTWIDTH] IN BLOOD BY AUTOMATED COUNT: 40.6 FL (ref 35.9–50)
GLOBULIN SER CALC-MCNC: 3 G/DL (ref 1.9–3.5)
GLUCOSE SERPL-MCNC: 256 MG/DL (ref 65–99)
GLUCOSE UR STRIP.AUTO-MCNC: >=1000 MG/DL
HCT VFR BLD AUTO: 42.6 % (ref 42–52)
HGB BLD-MCNC: 14.5 G/DL (ref 14–18)
IMM GRANULOCYTES # BLD AUTO: 0.04 K/UL (ref 0–0.11)
IMM GRANULOCYTES NFR BLD AUTO: 0.4 % (ref 0–0.9)
KETONES UR STRIP.AUTO-MCNC: ABNORMAL MG/DL
LEUKOCYTE ESTERASE UR QL STRIP.AUTO: NEGATIVE
LYMPHOCYTES # BLD AUTO: 2.27 K/UL (ref 1–4.8)
LYMPHOCYTES NFR BLD: 21.6 % (ref 22–41)
MCH RBC QN AUTO: 30.6 PG (ref 27–33)
MCHC RBC AUTO-ENTMCNC: 34 G/DL (ref 33.7–35.3)
MCV RBC AUTO: 89.9 FL (ref 81.4–97.8)
MICRO URNS: ABNORMAL
MONOCYTES # BLD AUTO: 0.52 K/UL (ref 0–0.85)
MONOCYTES NFR BLD AUTO: 4.9 % (ref 0–13.4)
N GONORRHOEA DNA SPEC QL NAA+PROBE: NEGATIVE
NEUTROPHILS # BLD AUTO: 7.49 K/UL (ref 1.82–7.42)
NEUTROPHILS NFR BLD: 71.3 % (ref 44–72)
NITRITE UR QL STRIP.AUTO: NEGATIVE
NRBC # BLD AUTO: 0 K/UL
NRBC BLD-RTO: 0 /100 WBC
PH UR STRIP.AUTO: 5.5 [PH]
PLATELET # BLD AUTO: 275 K/UL (ref 164–446)
PMV BLD AUTO: 10.2 FL (ref 9–12.9)
POTASSIUM SERPL-SCNC: 4.5 MMOL/L (ref 3.6–5.5)
PROT SERPL-MCNC: 6.9 G/DL (ref 6–8.2)
PROT UR QL STRIP: NEGATIVE MG/DL
RBC # BLD AUTO: 4.74 M/UL (ref 4.7–6.1)
RBC UR QL AUTO: NEGATIVE
SODIUM SERPL-SCNC: 139 MMOL/L (ref 135–145)
SP GR UR STRIP.AUTO: 1.04
SPECIMEN SOURCE: NORMAL
UROBILINOGEN UR STRIP.AUTO-MCNC: 1 MG/DL
WBC # BLD AUTO: 10.5 K/UL (ref 4.8–10.8)

## 2019-04-06 PROCEDURE — 700111 HCHG RX REV CODE 636 W/ 250 OVERRIDE (IP)

## 2019-04-06 PROCEDURE — 96374 THER/PROPH/DIAG INJ IV PUSH: CPT

## 2019-04-06 PROCEDURE — 700101 HCHG RX REV CODE 250: Performed by: EMERGENCY MEDICINE

## 2019-04-06 PROCEDURE — 700102 HCHG RX REV CODE 250 W/ 637 OVERRIDE(OP): Performed by: EMERGENCY MEDICINE

## 2019-04-06 PROCEDURE — 99285 EMERGENCY DEPT VISIT HI MDM: CPT

## 2019-04-06 PROCEDURE — 36415 COLL VENOUS BLD VENIPUNCTURE: CPT

## 2019-04-06 PROCEDURE — 80053 COMPREHEN METABOLIC PANEL: CPT

## 2019-04-06 PROCEDURE — 87591 N.GONORRHOEAE DNA AMP PROB: CPT

## 2019-04-06 PROCEDURE — 87491 CHLMYD TRACH DNA AMP PROBE: CPT

## 2019-04-06 PROCEDURE — 85025 COMPLETE CBC W/AUTO DIFF WBC: CPT

## 2019-04-06 PROCEDURE — 81003 URINALYSIS AUTO W/O SCOPE: CPT

## 2019-04-06 PROCEDURE — 700111 HCHG RX REV CODE 636 W/ 250 OVERRIDE (IP): Performed by: EMERGENCY MEDICINE

## 2019-04-06 PROCEDURE — 76870 US EXAM SCROTUM: CPT

## 2019-04-06 PROCEDURE — A9270 NON-COVERED ITEM OR SERVICE: HCPCS | Performed by: EMERGENCY MEDICINE

## 2019-04-06 PROCEDURE — 96372 THER/PROPH/DIAG INJ SC/IM: CPT | Mod: XU

## 2019-04-06 RX ORDER — KETOROLAC TROMETHAMINE 30 MG/ML
INJECTION, SOLUTION INTRAMUSCULAR; INTRAVENOUS
Status: COMPLETED
Start: 2019-04-06 | End: 2019-04-06

## 2019-04-06 RX ORDER — DOXYCYCLINE 100 MG/1
100 TABLET ORAL ONCE
Status: COMPLETED | OUTPATIENT
Start: 2019-04-06 | End: 2019-04-06

## 2019-04-06 RX ORDER — KETOROLAC TROMETHAMINE 30 MG/ML
30 INJECTION, SOLUTION INTRAMUSCULAR; INTRAVENOUS ONCE
Status: COMPLETED | OUTPATIENT
Start: 2019-04-06 | End: 2019-04-06

## 2019-04-06 RX ORDER — CEFTRIAXONE SODIUM 250 MG/1
250 INJECTION, POWDER, FOR SOLUTION INTRAMUSCULAR; INTRAVENOUS ONCE
Status: COMPLETED | OUTPATIENT
Start: 2019-04-06 | End: 2019-04-06

## 2019-04-06 RX ORDER — ACETAMINOPHEN 325 MG/1
650 TABLET ORAL ONCE
Status: COMPLETED | OUTPATIENT
Start: 2019-04-06 | End: 2019-04-06

## 2019-04-06 RX ORDER — DOXYCYCLINE 100 MG/1
100 CAPSULE ORAL 2 TIMES DAILY
Qty: 20 CAP | Refills: 0 | Status: SHIPPED | OUTPATIENT
Start: 2019-04-06 | End: 2019-04-16

## 2019-04-06 RX ADMIN — KETOROLAC TROMETHAMINE 30 MG: 30 INJECTION, SOLUTION INTRAMUSCULAR; INTRAVENOUS at 12:20

## 2019-04-06 RX ADMIN — CEFTRIAXONE SODIUM 250 MG: 250 INJECTION, POWDER, FOR SOLUTION INTRAMUSCULAR; INTRAVENOUS at 13:23

## 2019-04-06 RX ADMIN — DOXYCYCLINE 100 MG: 100 TABLET, FILM COATED ORAL at 13:22

## 2019-04-06 RX ADMIN — ACETAMINOPHEN 650 MG: 325 TABLET, FILM COATED ORAL at 13:22

## 2019-04-06 RX ADMIN — LIDOCAINE HYDROCHLORIDE 1 ML: 10 INJECTION, SOLUTION INFILTRATION; PERINEURAL at 13:23

## 2019-04-06 NOTE — ED NOTES
Patient walked to [facility] area room 75 with a steady gate at this time, ready to be seen. Placed patient in gown, gave call light, and warm blanket.   He was able to walk to and from restroom with steady gate at this time, urine sample is at bedside

## 2019-04-06 NOTE — ED TRIAGE NOTES
Pt amb to triage.  Chief Complaint   Patient presents with   • Testicle Pain     bilat, x5d, denies swelling

## 2019-04-06 NOTE — ED NOTES
All lines and monitors D/Cd.  Discharge instructions given, questions answered.  Left ER on foot escorted by RN. Pt states all belongings in possession.  Rx x1 given.

## 2019-04-06 NOTE — ED PROVIDER NOTES
"ED Provider Note    Scribed for Kandy Escalera D.O. by Kwesi Dey. 4/6/2019, 11:02 AM.    Primary care provider: None noted  Means of arrival: Walk-In  History obtained from: Patient  History limited by: None    CHIEF COMPLAINT  Chief Complaint   Patient presents with   • Testicle Pain     bilat, x5d, denies swelling     HPI  Gerber Langston is a 33 y.o. male who presents to the Emergency Department for evaluation of bilateral testicular pain that began 5 days ago. He states it began as a dull ache and it has worsened in severity. He denies any hematuria but states he has had some dysuria. He is sexually active and has been with the same person for 8 years but does not use protection. He denies any chest pain, shortness of breath, coughing, wheezing, neck pain, back pain, sore throat, fevers, vomiting, diarrhea, abdominal pain, or testicular discharge. He has taken Tylenol and Ibuprofen to alleviate symptoms with some effect. He has a history of diabetes and his sugars have been running at roughly 2-3. He denies any modifying factors.    REVIEW OF SYSTEMS  See HPI for further details. All other systems are negative.     PAST MEDICAL HISTORY   has a past medical history of Diabetes (HCC) and Hepatitis C.    SURGICAL HISTORY   has a past surgical history that includes hernia repair (Left, 4/2016).    SOCIAL HISTORY  Social History   Substance Use Topics   • Smoking status: Current Every Day Smoker     Packs/day: 0.25     Types: Cigarettes   • Smokeless tobacco: Never Used   • Alcohol use Yes      Comment: socially      History   Drug Use No     Comment: hx of meth use, denies use currently     FAMILY HISTORY  None noted.    CURRENT MEDICATIONS  Reviewed.  See Encounter Summary.     ALLERGIES  No Known Allergies    PHYSICAL EXAM  VITAL SIGNS: /98   Pulse 86   Temp 36.3 °C (97.4 °F) (Temporal)   Resp 16   Ht 1.626 m (5' 4\")   Wt 63 kg (138 lb 14.2 oz)   SpO2 96%   BMI 23.84 kg/m²   Constitutional: " Alert and in no apparent distress.  HENT: Normocephalic atraumatic. Bilateral external ears normal. Nose normal. Mucous membranes are moist.  Eyes: Pupils are equal and reactive. Conjunctiva normal. Non-icteric sclera.   Neck: Normal range of motion without tenderness. Supple. No meningeal signs.  Cardiovascular: Regular rate and rhythm. No murmurs, gallops or rubs.  Thorax & Lungs: Breath sounds are clear to auscultation bilaterally. No wheezing, rhonchi or rales.  Abdomen: Soft, nontender and nondistended. No peritoneal signs noted.  Skin: Warm and dry. No rashes are noted.  Back: No bony tenderness, No CVA tenderness.   Extremities: 2+ peripheral pulses. Cap refill is less than 2 seconds. No edema, cyanosis, or clubbing.  Musculoskeletal: Good range of motion in all major joints. No tenderness to palpation or major deformities noted.   Genitourinary: Chaperone - Romie, EDT. Tenderness to palpation to epididymis bilaterally. Cremasteric reflex is difficult to elicit bilaterally. Normal circumsized penis, no discharge.   Neurologic: Alert and oriented ×3. The patient moves all 4 extremities and follows commands.  Psychiatric: Affect is normal. Judgment appears to be intact.    DIAGNOSTIC STUDIES / PROCEDURES     LABS  Results for orders placed or performed during the hospital encounter of 04/06/19   CBC WITH DIFFERENTIAL   Result Value Ref Range    WBC 10.5 4.8 - 10.8 K/uL    RBC 4.74 4.70 - 6.10 M/uL    Hemoglobin 14.5 14.0 - 18.0 g/dL    Hematocrit 42.6 42.0 - 52.0 %    MCV 89.9 81.4 - 97.8 fL    MCH 30.6 27.0 - 33.0 pg    MCHC 34.0 33.7 - 35.3 g/dL    RDW 40.6 35.9 - 50.0 fL    Platelet Count 275 164 - 446 K/uL    MPV 10.2 9.0 - 12.9 fL    Neutrophils-Polys 71.30 44.00 - 72.00 %    Lymphocytes 21.60 (L) 22.00 - 41.00 %    Monocytes 4.90 0.00 - 13.40 %    Eosinophils 1.30 0.00 - 6.90 %    Basophils 0.50 0.00 - 1.80 %    Immature Granulocytes 0.40 0.00 - 0.90 %    Nucleated RBC 0.00 /100 WBC    Neutrophils  (Absolute) 7.49 (H) 1.82 - 7.42 K/uL    Lymphs (Absolute) 2.27 1.00 - 4.80 K/uL    Monos (Absolute) 0.52 0.00 - 0.85 K/uL    Eos (Absolute) 0.14 0.00 - 0.51 K/uL    Baso (Absolute) 0.05 0.00 - 0.12 K/uL    Immature Granulocytes (abs) 0.04 0.00 - 0.11 K/uL    NRBC (Absolute) 0.00 K/uL   COMP METABOLIC PANEL   Result Value Ref Range    Sodium 139 135 - 145 mmol/L    Potassium 4.5 3.6 - 5.5 mmol/L    Chloride 105 96 - 112 mmol/L    Co2 28 20 - 33 mmol/L    Anion Gap 6.0 0.0 - 11.9    Glucose 256 (H) 65 - 99 mg/dL    Bun 10 8 - 22 mg/dL    Creatinine 0.74 0.50 - 1.40 mg/dL    Calcium 8.8 8.5 - 10.5 mg/dL    AST(SGOT) 13 12 - 45 U/L    ALT(SGPT) 17 2 - 50 U/L    Alkaline Phosphatase 87 30 - 99 U/L    Total Bilirubin 0.3 0.1 - 1.5 mg/dL    Albumin 3.9 3.2 - 4.9 g/dL    Total Protein 6.9 6.0 - 8.2 g/dL    Globulin 3.0 1.9 - 3.5 g/dL    A-G Ratio 1.3 g/dL   URINALYSIS CULTURE, IF INDICATED   Result Value Ref Range    Color Yellow     Character Clear     Specific Gravity 1.044 <1.035    Ph 5.5 5.0 - 8.0    Glucose >=1000 (A) Negative mg/dL    Ketones Trace (A) Negative mg/dL    Protein Negative Negative mg/dL    Bilirubin Negative Negative    Urobilinogen, Urine 1.0 Negative    Nitrite Negative Negative    Leukocyte Esterase Negative Negative    Occult Blood Negative Negative    Micro Urine Req see below    Chlamydia/GC PCR Urine Or Swab   Result Value Ref Range    Source Urine    ESTIMATED GFR   Result Value Ref Range    GFR If African American >60 >60 mL/min/1.73 m 2    GFR If Non African American >60 >60 mL/min/1.73 m 2   All labs were reviewed by me.    RADIOLOGY  [id number]   Final Result      No evidence of testicular mass or torsion.        The radiologist's interpretation of all radiological studies have been reviewed by me.    COURSE & MEDICAL DECISION MAKING  Pertinent Labs & Imaging studies reviewed. (See chart for details)    11:02 AM - Patient seen and examined at bedside. Ordered US Scrotum, Chlamydia  PCR Urine or Swab, CBC, CMP, GFR, Urinalysis to evaluate his symptoms.     Decision Making:  This is a 33 y.o. year old male who presents with testicular pain.  On initial evaluation, the patient appeared well and in no acute distress.  His vital signs were normal.  His abdominal exam was benign.  A chaperoned exam revealed tenderness to palpation over the epididymis with no swelling, redness, or masses.  Cremasteric reflexes difficult to elicit bilaterally.  No discharge have been noted from the penis.  I did obtain an ultrasound which did not reveal any evidence of testicular mass or torsion.  No hernias were appreciated.  Urinalysis was obtained and notable for glucose and trace ketones.  His serum glucose was elevated at 256 but no additional ultra abnormalities were noted and I have low clinical suspicion for DKA.  Additionally, a white blood cell count was normal and reassuring.  He did not have any skin changes over the scrotum and have extremely low clinical suspicion for Faraz's gangrene.  I think that his clinical presentation is most consistent with epididymitis and/or orchitis.  GC cultures were sent and pending at time of discharge.  I did encourage him to follow-up on these results with his primary care physician.  He was given a prescription for doxycycline and given his first dose here in the emergency department in addition to IM ceftriaxone.  He was discharged home and will follow-up as outlined above.  He will return to the ED with any worsening signs or symptoms.    DISPOSITION:  Patient will be discharged home in stable condition.    FOLLOW UP:  [address] [address]  [phone]  Call in 1 day  To schedule a follow up appointment    [facility], Emergency Dept  [address] [facility] [id number] [phone]  Go to   As needed    OUTPATIENT MEDICATIONS:  Discharge Medication List as of 4/6/2019  1:20 PM      START taking  these medications    Details   doxycycline (MONODOX) 100 MG capsule Take 1 Cap by mouth 2 times a day for 10 days., Disp-20 Cap, R-0, Print Rx Paper           FINAL IMPRESSION  1. Testicular pain    2. Hyperglycemia       I, Kwesi Dey (Scribe), am scribing for, and in the presence of, Kandy Escalera D.O..    Electronically signed by: Kwesi Dey (Scribe), 4/6/2019    IKandy D.O. personally performed the services described in this documentation, as scribed by Kwesi Dey in my presence, and it is both accurate and complete.    C    The note accurately reflects work and decisions made by me.  Kandy Escalera  4/6/2019  1:39 PM

## 2019-04-08 ENCOUNTER — HOSPITAL ENCOUNTER (EMERGENCY)
Facility: MEDICAL CENTER | Age: 34
End: 2019-04-08
Attending: EMERGENCY MEDICINE
Payer: MEDICAID

## 2019-04-08 ENCOUNTER — APPOINTMENT (OUTPATIENT)
Dept: RADIOLOGY | Facility: MEDICAL CENTER | Age: 34
End: 2019-04-08
Attending: EMERGENCY MEDICINE
Payer: MEDICAID

## 2019-04-08 VITALS
HEART RATE: 90 BPM | DIASTOLIC BLOOD PRESSURE: 89 MMHG | SYSTOLIC BLOOD PRESSURE: 122 MMHG | HEIGHT: 64 IN | WEIGHT: 141.54 LBS | RESPIRATION RATE: 18 BRPM | TEMPERATURE: 98.4 F | OXYGEN SATURATION: 96 % | BODY MASS INDEX: 24.16 KG/M2

## 2019-04-08 DIAGNOSIS — N23 RENAL COLIC: ICD-10-CM

## 2019-04-08 DIAGNOSIS — N50.819 TESTICULAR PAIN: ICD-10-CM

## 2019-04-08 DIAGNOSIS — R73.9 HYPERGLYCEMIA: ICD-10-CM

## 2019-04-08 LAB
APPEARANCE UR: CLEAR
COLOR UR AUTO: YELLOW
GLUCOSE BLD-MCNC: 212 MG/DL (ref 65–99)
GLUCOSE UR QL STRIP.AUTO: 100 MG/DL
KETONES UR QL STRIP.AUTO: NEGATIVE MG/DL
LEUKOCYTE ESTERASE UR QL STRIP.AUTO: NEGATIVE
NITRITE UR QL STRIP.AUTO: NEGATIVE
PH UR STRIP.AUTO: 6 [PH]
PROT UR QL STRIP: NEGATIVE MG/DL
RBC UR QL AUTO: NEGATIVE
SP GR UR: 1.02

## 2019-04-08 PROCEDURE — 82962 GLUCOSE BLOOD TEST: CPT

## 2019-04-08 PROCEDURE — 99284 EMERGENCY DEPT VISIT MOD MDM: CPT

## 2019-04-08 PROCEDURE — 74176 CT ABD & PELVIS W/O CONTRAST: CPT

## 2019-04-08 PROCEDURE — A9270 NON-COVERED ITEM OR SERVICE: HCPCS | Performed by: EMERGENCY MEDICINE

## 2019-04-08 PROCEDURE — 700102 HCHG RX REV CODE 250 W/ 637 OVERRIDE(OP): Performed by: EMERGENCY MEDICINE

## 2019-04-08 PROCEDURE — 81002 URINALYSIS NONAUTO W/O SCOPE: CPT

## 2019-04-08 RX ORDER — OXYCODONE HYDROCHLORIDE AND ACETAMINOPHEN 5; 325 MG/1; MG/1
1 TABLET ORAL ONCE
Status: COMPLETED | OUTPATIENT
Start: 2019-04-08 | End: 2019-04-08

## 2019-04-08 RX ADMIN — OXYCODONE HYDROCHLORIDE AND ACETAMINOPHEN 1 TABLET: 5; 325 TABLET ORAL at 09:58

## 2019-04-08 NOTE — ED PROVIDER NOTES
ED Provider Note    Scribed for Melquiades Hernandez M.D. by Aleyda Gudino. 4/8/2019  9:41 AM    Primary Care Provider: Pcp Pt States None  Means of arrival: walk-in  History limited by: none    CHIEF COMPLAINT  Chief Complaint   Patient presents with   • Testicle Pain     pt was seen here on Saturday for the same.  Pt has been taking doxycycline as directed and pain is getting worse.       HPI  Gerber Langston is a 33 y.o. male who presents to the ED complaining of sudden onset diffuse,  scrotal and testicle pain. that has been present for 7 days with associated nausea. Patient reports no obvious trauma to the area. He was evaluated for this discomfort 2 days ago and placed on doxycycline following a normal scrotal US, reporting no improvement to the pain since being on this medication. No complaints of fevers, chills, vision changes, urinary symptoms, sore throat, blood in the stool, swelling in the genitals, discharge from the penis, chest pain, shortness of breath, weakness, rash, headache.      REVIEW OF SYSTEMS    CONSTITUTIONAL:  Denies fever, chills,  EYES:  Denies vision changes  ENT:  Denies sore throat  CARDIOVASCULAR:  Denies chest pain  RESPIRATORY:  Denies shortness of breath, difficulty breathing.  GI:  Positive nausea Denies abdominal pain, vomiting, blood in the stool or diarrhea.  : positive diffuse scrotal and testicle pain Denies dysuria, discharge from the penis, swelling in the genitals  MUSCULOSKELETAL:  Denies weakness  SKIN:  No rash  NEUROLOGIC:  Denies headache      PAST MEDICAL HISTORY  Past Medical History:   Diagnosis Date   • Diabetes (HCC)     type 1   • Hepatitis C        FAMILY HISTORY  History reviewed. No pertinent family history.    SOCIAL HISTORY   reports that he has been smoking Cigarettes.  He has been smoking about 0.25 packs per day. He has never used smokeless tobacco. He reports that he drinks alcohol. He reports that he does not use drugs.    SURGICAL HISTORY  Past  "Surgical History:   Procedure Laterality Date   • HERNIA REPAIR Left 4/2016    inguinal       CURRENT MEDICATIONS  Home Medications     Reviewed by Sultana White R.N. (Registered Nurse) on 04/08/19 at 0846  Med List Status: Partial   Medication Last Dose Status   doxycycline (MONODOX) 100 MG capsule 4/8/2019 Active   NON SPECIFIED 4/7/2019 Active                ALLERGIES  No Known Allergies    PHYSICAL EXAM  VITAL SIGNS: /91   Pulse (!) 102   Temp 36.6 °C (97.8 °F) (Temporal)   Resp 16   Ht 1.626 m (5' 4\")   Wt 64.2 kg (141 lb 8.6 oz)   SpO2 96%   BMI 24.29 kg/m²      Constitutional: Patient is awake and alert. No acute respiratory distress. Well developed, Well nourished, Non-toxic appearance.  HENT: Normocephalic, Atraumatic, Bilateral external ears normal, Oropharynx pink moist with no exudates, Nose patent.  Neck:  Supple no nuchal rigidity, no thyromegaly or mass. Non-tender  Cardiovascular: Heart is regular rate and rhythm no murmur, rub or thrill.   Thorax & Lungs: Chest is symmetrical, with good breath sounds. No wheezing or crackles. No respiratory distress, No chest tenderness.   Abdomen: Soft, No tenderness no hepatosplenomegaly there is no guarding or rebound, No masses, No pulsatile masses.   : normal male penile shaft, testes are down, not enlarged, prostate is non tender with palpation., no drainage or erythema.  He says his testicles are sore in his scrotal area but actually pushed on the does not seem to give him pain I do not feel any masses there is no signs of cellulitis is.  Skin: Warm, Dry, no petechia, purpura, or rash.  Multiple tattoos on face arms back but no signs of cellulitis is.  Extremities: No edema. Non tender.   Musculoskeletal: Good range of motion to upper and lower extremities neurologic: Alert & oriented to person, time, and place.  Strength is 5 over 5 and symmetric in bilateral upper and lower extremities.  Sensory is intact to light touch to face, arms, and " legs.  DTRs are symmetrical in biceps brachioradialis, patella and Achilles.   Psychiatric: Normal affect    LABS  Results for orders placed or performed during the hospital encounter of 04/08/19   POC UA   Result Value Ref Range    POC Color Yellow     POC Appearance Clear     POC Glucose 100 (A) Negative mg/dL    POC Ketones Negative Negative mg/dL    POC Specific Gravity 1.025 1.005 - 1.030    POC Blood Negative Negative    POC Urine PH 6.0 5.0 - 8.0    POC Protein Negative Negative mg/dL    POC Nitrites Negative Negative    POC Leukocyte Esterase Negative Negative   ACCU-CHEK GLUCOSE   Result Value Ref Range    Glucose - Accu-Ck 212 (H) 65 - 99 mg/dL       All labs reviewed by me.      CT-RENAL COLIC EVALUATION(A/P W/O)   Final Result      No hydronephrosis or urolithiasis.   3 mm calcification posterior margin of the urinary bladder which could represent recently passed ureteral stone.   No evidence of bowel obstruction or acute appendicitis. No free fluid.          COURSE & MEDICAL DECISION MAKING  Pertinent Labs & Imaging studies reviewed. (See chart for details)    Differential diagnoses include but are not limited to urethritis, UTI, kidney stones, torsed testicle, epididymitis, scrotal cellulitis, intra-abdominal pathology,    Review of past medical records shows the patient was seen for the same 2 days ago. Testicle US completed and was completed normal. Placed on doxycycline.    9:41 AM - Patient seen and examined at bedside. Ordered UA, abdominal CTand accucheck.  Patient will be medicated with 1 5-325mg percocet tablet for his symptoms. I informed the patient that I would evaluate with a UA and abdominal CT and manage his discomfort. Patient understands and agrees with treatment plan.    12:01 PM I re-evaluated patient at bedside and informed him of his work up results. He reports complete resolve of his pain. I explained that his imaging showed a passed kidney stone, explaining that this could have  been the origin of his pain. Otherwise work up is negative and patient will be discharged. He understands and agrees with treatment plan and discharge    Decision Making  Patient has had scrotal testicle pain for a few days.  He had an ultrasound done recently with blood work and a urine.  He is also placed on antibiotics but he says not really help much.  Is not seen doctors in follow-up yet.  Here in the emergency department did a rectal exam on him no prostate tenderness.  I did CAT scan I think he may have had a kidney stone.  Of interest is when I went back and on checked on him his pain was completely gone and the CAT scan revealed that there may be a small kidney stone in the bladder at this time.  This certainly would explain why the pain was gone why he had the pain initially in the testicles.  However explained to both him and his mother who is at the bedside that he will need very close follow-up as an outpatient.   The patient will return for new or worsening symptoms and is stable at the time of discharge.  Patient does have hyperglycemia he knows is a diabetic he would take his medicines and follow-up closely with his primary care doctor tomorrow    DISPOSITION:  Patient will be discharged home in stable condition.    FOLLOW UP:  pcp  Your primary care doctor you have tomorrow  In 1 day        FINAL IMPRESSION  1. Testicular pain    2. Renal colic    3. Hyperglycemia        PLAN  1.  Testicular pain information sheet  2.  Take your diabetes medicine  3.  Follow-up with your primary care doctor tomorrow  4.  Scrotal pain information sheet/renal colic information sheet/diabetes information sheet  5. Return to the emergency department for increased pains, fevers, vomiting or change in condition.     IAleyda (Scribe), am scribing for, and in the presence of, Melquiades Hernandez M.D..    Electronically signed by: Aleyda Gudino (Scrtaye), 4/8/2019    Melquiades ROWE M.D. personally performed the  services described in this documentation, as scribed by Aleyda Gudino in my presence, and it is both accurate and complete.    The note accurately reflects work and decisions made by me.  Melquiades Hernandez  4/8/2019  4:03 PM

## 2019-04-08 NOTE — ED TRIAGE NOTES
"Gerber Rhoades Kian 33 y.o. male ambulatory to triage for     Chief Complaint   Patient presents with   • Testicle Pain     pt was seen here on Saturday for the same.  Pt has been taking doxycycline as directed and pain is getting worse.     /91   Pulse (!) 102   Temp 36.6 °C (97.8 °F) (Temporal)   Resp 16   Ht 1.626 m (5' 4\")   Wt 64.2 kg (141 lb 8.6 oz)   SpO2 96%   BMI 24.29 kg/m²   Pt returned to the lobby to await bed assignment.  Advised to return to the triage desk for any changes/concerns.  "

## 2019-04-09 ENCOUNTER — APPOINTMENT (OUTPATIENT)
Dept: RADIOLOGY | Facility: MEDICAL CENTER | Age: 34
End: 2019-04-09
Attending: EMERGENCY MEDICINE
Payer: MEDICAID

## 2019-04-09 ENCOUNTER — HOSPITAL ENCOUNTER (EMERGENCY)
Facility: MEDICAL CENTER | Age: 34
End: 2019-04-09
Attending: EMERGENCY MEDICINE
Payer: MEDICAID

## 2019-04-09 VITALS
HEIGHT: 64 IN | RESPIRATION RATE: 14 BRPM | WEIGHT: 146.16 LBS | OXYGEN SATURATION: 98 % | DIASTOLIC BLOOD PRESSURE: 95 MMHG | BODY MASS INDEX: 24.95 KG/M2 | TEMPERATURE: 98.1 F | HEART RATE: 85 BPM | SYSTOLIC BLOOD PRESSURE: 143 MMHG

## 2019-04-09 DIAGNOSIS — N50.819 TESTICULAR PAIN: ICD-10-CM

## 2019-04-09 LAB
APPEARANCE UR: CLEAR
BILIRUB UR QL STRIP.AUTO: NEGATIVE
C TRACH DNA SPEC QL NAA+PROBE: NEGATIVE
COLOR UR: YELLOW
GLUCOSE BLD-MCNC: 216 MG/DL (ref 65–99)
GLUCOSE UR STRIP.AUTO-MCNC: 500 MG/DL
KETONES UR STRIP.AUTO-MCNC: NEGATIVE MG/DL
LEUKOCYTE ESTERASE UR QL STRIP.AUTO: NEGATIVE
MICRO URNS: ABNORMAL
N GONORRHOEA DNA SPEC QL NAA+PROBE: NEGATIVE
NITRITE UR QL STRIP.AUTO: NEGATIVE
PH UR STRIP.AUTO: 5 [PH]
PROT UR QL STRIP: NEGATIVE MG/DL
RBC UR QL AUTO: NEGATIVE
SP GR UR STRIP.AUTO: 1.01
SPECIMEN SOURCE: NORMAL
UROBILINOGEN UR STRIP.AUTO-MCNC: 0.2 MG/DL

## 2019-04-09 PROCEDURE — 76870 US EXAM SCROTUM: CPT

## 2019-04-09 PROCEDURE — A9270 NON-COVERED ITEM OR SERVICE: HCPCS | Performed by: EMERGENCY MEDICINE

## 2019-04-09 PROCEDURE — 82962 GLUCOSE BLOOD TEST: CPT

## 2019-04-09 PROCEDURE — 87491 CHLMYD TRACH DNA AMP PROBE: CPT

## 2019-04-09 PROCEDURE — 700111 HCHG RX REV CODE 636 W/ 250 OVERRIDE (IP): Performed by: EMERGENCY MEDICINE

## 2019-04-09 PROCEDURE — 81003 URINALYSIS AUTO W/O SCOPE: CPT

## 2019-04-09 PROCEDURE — 99284 EMERGENCY DEPT VISIT MOD MDM: CPT

## 2019-04-09 PROCEDURE — 87591 N.GONORRHOEAE DNA AMP PROB: CPT

## 2019-04-09 PROCEDURE — 700102 HCHG RX REV CODE 250 W/ 637 OVERRIDE(OP): Performed by: EMERGENCY MEDICINE

## 2019-04-09 RX ORDER — OXYCODONE HYDROCHLORIDE AND ACETAMINOPHEN 5; 325 MG/1; MG/1
1 TABLET ORAL ONCE
Status: COMPLETED | OUTPATIENT
Start: 2019-04-09 | End: 2019-04-09

## 2019-04-09 RX ORDER — IBUPROFEN 800 MG/1
800 TABLET ORAL EVERY 8 HOURS PRN
Qty: 30 TAB | Refills: 0 | Status: SHIPPED
Start: 2019-04-09 | End: 2019-07-21

## 2019-04-09 RX ORDER — ONDANSETRON 4 MG/1
4 TABLET, ORALLY DISINTEGRATING ORAL ONCE
Status: COMPLETED | OUTPATIENT
Start: 2019-04-09 | End: 2019-04-09

## 2019-04-09 RX ADMIN — OXYCODONE HYDROCHLORIDE AND ACETAMINOPHEN 1 TABLET: 5; 325 TABLET ORAL at 09:42

## 2019-04-09 RX ADMIN — ONDANSETRON 4 MG: 4 TABLET, ORALLY DISINTEGRATING ORAL at 09:42

## 2019-04-09 ASSESSMENT — LIFESTYLE VARIABLES: DO YOU DRINK ALCOHOL: NO

## 2019-04-09 NOTE — ED NOTES
Patient returned from ultrasound, awake alert and oriented x 4, Glascow 15, bed in low position, call light within reach, on room air, unlabored breathing noted.

## 2019-04-09 NOTE — ED NOTES
Patient resting in bed, attached to cardiac monitor, unlabored breathing noted, awake alert and oriented x 4, Glascow 15, bed in low position, call light within reach, on room air, awaiting ultrasound, interactive with staff, interactions noted at appropriate, urine sent for analysis.

## 2019-04-09 NOTE — ED NOTES
Patient resting in bed, unlabored breathing noted, awake alert and oriented x 4, Glascow 15, bed in low position, call light within reach, on room air, no cough noted, states pain improved, interactive with staff, interactions noted as appropriate, responds to stimuli appropriately, watching television.

## 2019-04-09 NOTE — ED PROVIDER NOTES
"ED Provider Note    CHIEF COMPLAINT  Chief Complaint   Patient presents with   • Testicle Pain       HPI  Gerber Langston is a 33 y.o. male here for evaluation of testicular pain.  The patient states that he has had this pain for approximately 8 days, and states that he was recently seen and evaluated for this as well.  He states that his pain has increased, but he denies any fever or vomiting.  The patient states that he has some diffuse testicular tenderness, but no penile discharge, and no dysuria, urgency or frequency.  He is sexually active with the same partner, and this is mostly unprotected however this is been ongoing for the last 8 years.     PAST MEDICAL HISTORY   has a past medical history of Diabetes (HCC) and Hepatitis C.    SOCIAL HISTORY  Social History     Social History Main Topics   • Smoking status: Current Every Day Smoker     Packs/day: 0.25     Types: Cigarettes   • Smokeless tobacco: Never Used   • Alcohol use Yes      Comment: socially   • Drug use: No      Comment: hx of meth use, denies use currently   • Sexual activity: Not on file       SURGICAL HISTORY   has a past surgical history that includes hernia repair (Left, 4/2016).    CURRENT MEDICATIONS  Home Medications    **Home medications have not yet been reviewed for this encounter**         ALLERGIES  No Known Allergies    REVIEW OF SYSTEMS  See HPI for further details. Review of systems as above, otherwise all other systems are negative.     PHYSICAL EXAM  VITAL SIGNS: /94   Pulse 74   Temp 36.7 °C (98.1 °F) (Temporal)   Resp 16   Ht 1.626 m (5' 4\")   Wt 66.3 kg (146 lb 2.6 oz)   SpO2 99%   BMI 25.09 kg/m²     Constitutional: Well developed, well nourished. No acute distress.  HEENT: Normocephalic, atraumatic. MMM  Neck: Supple, Full range of motion   Chest/Pulmonary:  No respiratory distress.  Equal expansion   Gu:  Tenderness to bilateral testicles.  Circumcised, no penile discharge.  No appreciable hernia " noted  Musculoskeletal: No deformity, no edema, neurovascular intact.   Neuro: Clear speech, appropriate, cooperative, cranial nerves II-XII grossly intact.  Psych: Normal mood and affect    Results for orders placed or performed during the hospital encounter of 04/09/19   URINALYSIS,CULTURE IF INDICATED   Result Value Ref Range    Color Yellow     Character Clear     Specific Gravity 1.012 <1.035    Ph 5.0 5.0 - 8.0    Glucose 500 (A) Negative mg/dL    Ketones Negative Negative mg/dL    Protein Negative Negative mg/dL    Bilirubin Negative Negative    Urobilinogen, Urine 0.2 Negative    Nitrite Negative Negative    Leukocyte Esterase Negative Negative    Occult Blood Negative Negative    Micro Urine Req see below    Chlamydia/GC PCR Urine Or Swab   Result Value Ref Range    Source Urine    ACCU-CHEK GLUCOSE   Result Value Ref Range    Glucose - Accu-Ck 216 (H) 65 - 99 mg/dL     [id number]   Final Result      1.  No intratesticular mass or evidence for torsion.   2.  Small LEFT varicocele.        11:04 AM  At this time, the pt is nontoxic appearing, afebrile, and has a negative acute u/s and ua.  He was previously worked up for the same, a few days ago, but had worsening symptoms, which is why I repeated the studies. He will be sent home for follow up, and will wait for the culture results on his g/c.  I do, however, have a low suspicion, and he is currently on doxy at this time.       PROCEDURES     MEDICAL RECORD  I have reviewed patient's medical record and pertinent results are listed above.    COURSE & MEDICAL DECISION MAKING  I have reviewed any medical record information, laboratory studies and radiographic results as noted above.    I you have had any blood pressure issues while here in the emergency department, please see your doctor for a further evaluation or work up.    Differential diagnoses include but not limited to: torsion, epididymitis, uti, g/c    This patient presents with testicular  pain.  At this time, I have counseled the patient/family regarding their medications, pain control, and follow up.  They will continue their medications, if any, as prescribed.  They will return immediately for any worsening symptoms and/or any other medical concerns.  They will see their doctor, or contact the doctor provided, in 1-2 days for follow up.       FINAL IMPRESSION  Testicular pain      Electronically signed by: Barry Dumont, 4/9/2019 10:01 AM

## 2019-04-09 NOTE — ED NOTES
Chief Complaint   Patient presents with   • Testicle Pain     Bilateral testicle pain for 8 days, does not radiate, associated with nausea and vomiting, pain improves with lower blood sugar, increased pain with elevated levels of blood sugar, denies redness or swelling in testes, patient reports tender with palpation.

## 2019-04-09 NOTE — ED NOTES
Patient verbalized understanding of discharge instructions, addressed all questions and concerns, gait steady, family at bedside at time of discharge.

## 2019-04-23 ENCOUNTER — APPOINTMENT (OUTPATIENT)
Dept: RADIOLOGY | Facility: MEDICAL CENTER | Age: 34
End: 2019-04-23
Attending: EMERGENCY MEDICINE
Payer: MEDICAID

## 2019-04-23 ENCOUNTER — HOSPITAL ENCOUNTER (EMERGENCY)
Facility: MEDICAL CENTER | Age: 34
End: 2019-04-23
Attending: EMERGENCY MEDICINE
Payer: MEDICAID

## 2019-04-23 VITALS
OXYGEN SATURATION: 99 % | HEIGHT: 64 IN | WEIGHT: 145.72 LBS | TEMPERATURE: 98.2 F | SYSTOLIC BLOOD PRESSURE: 149 MMHG | HEART RATE: 80 BPM | RESPIRATION RATE: 16 BRPM | DIASTOLIC BLOOD PRESSURE: 109 MMHG | BODY MASS INDEX: 24.88 KG/M2

## 2019-04-23 DIAGNOSIS — N48.89 PENILE PAIN, CHRONIC: ICD-10-CM

## 2019-04-23 DIAGNOSIS — G89.29 OTHER CHRONIC PAIN: ICD-10-CM

## 2019-04-23 DIAGNOSIS — G89.29 PENILE PAIN, CHRONIC: ICD-10-CM

## 2019-04-23 LAB
APPEARANCE UR: CLEAR
BILIRUB UR QL STRIP.AUTO: NEGATIVE
COLOR UR: YELLOW
GLUCOSE UR STRIP.AUTO-MCNC: NEGATIVE MG/DL
KETONES UR STRIP.AUTO-MCNC: NEGATIVE MG/DL
LEUKOCYTE ESTERASE UR QL STRIP.AUTO: NEGATIVE
MICRO URNS: NORMAL
NITRITE UR QL STRIP.AUTO: NEGATIVE
PH UR STRIP.AUTO: 5.5 [PH]
PROT UR QL STRIP: NEGATIVE MG/DL
RBC UR QL AUTO: NEGATIVE
SP GR UR STRIP.AUTO: 1.02
UROBILINOGEN UR STRIP.AUTO-MCNC: 0.2 MG/DL

## 2019-04-23 PROCEDURE — A9270 NON-COVERED ITEM OR SERVICE: HCPCS | Performed by: EMERGENCY MEDICINE

## 2019-04-23 PROCEDURE — 99283 EMERGENCY DEPT VISIT LOW MDM: CPT

## 2019-04-23 PROCEDURE — 76870 US EXAM SCROTUM: CPT

## 2019-04-23 PROCEDURE — 700102 HCHG RX REV CODE 250 W/ 637 OVERRIDE(OP): Performed by: EMERGENCY MEDICINE

## 2019-04-23 PROCEDURE — 74176 CT ABD & PELVIS W/O CONTRAST: CPT

## 2019-04-23 PROCEDURE — 81003 URINALYSIS AUTO W/O SCOPE: CPT

## 2019-04-23 RX ORDER — ACETAMINOPHEN 325 MG/1
650 TABLET ORAL ONCE
Status: COMPLETED | OUTPATIENT
Start: 2019-04-23 | End: 2019-04-23

## 2019-04-23 RX ADMIN — ACETAMINOPHEN 650 MG: 325 TABLET, FILM COATED ORAL at 10:26

## 2019-04-23 NOTE — ED PROVIDER NOTES
ED Provider Note    Scribed for Bienvenido Ramires M.D. by Shai Polanco. 4/23/2019  9:38 AM    Primary care provider: Pcp Pt States None  Means of arrival: Walk in  History obtained from: Patient  History limited by: None     CHIEF COMPLAINT  Chief Complaint   Patient presents with   • Penis Pain     sx for 3 weeks, seen here for same       HPI  Gerber Langston is a 33 y.o. male who presents to the Emergency Department complaining of penis pain for the past 3 weeks. He has been seen here with similar symptoms over that course of time and has had negative workups. He reports dysuria, as well as pain along the scrotal skin, but no testicular pain. He additionally reports bumps on his penis, but denies any discharge. He states that he feels like his urethral orifice is closing.      REVIEW OF SYSTEMS  Pertinent positives include penis pain, scrotal skin pain, penis bumps, dysuria.   Pertinent negatives include no penile discharge, testicular pain.    All other systems reviewed and negative. See HPI for further details.       PAST MEDICAL HISTORY   has a past medical history of Diabetes (HCC) and Hepatitis C.    SURGICAL HISTORY   has a past surgical history that includes hernia repair (Left, 4/2016).    SOCIAL HISTORY  Social History   Substance Use Topics   • Smoking status: Current Every Day Smoker     Packs/day: 0.25     Types: Cigarettes   • Smokeless tobacco: Never Used   • Alcohol use Yes      Comment: socially      History   Drug Use No     Comment: hx of meth use, denies use currently       FAMILY HISTORY  None    CURRENT MEDICATIONS  No current facility-administered medications on file prior to encounter.      Current Outpatient Prescriptions on File Prior to Encounter   Medication Sig Dispense Refill   • ibuprofen (MOTRIN) 800 MG Tab Take 1 Tab by mouth every 8 hours as needed. 30 Tab 0   • NON SPECIFIED         ALLERGIES  No Known Allergies    PHYSICAL EXAM  VITAL SIGNS: /101   Pulse (!) 107    "Temp 36.8 °C (98.2 °F) (Temporal)   Resp 16   Ht 1.626 m (5' 4\")   Wt 66.1 kg (145 lb 11.6 oz)   SpO2 99%   BMI 25.01 kg/m²     Nursing note and vitals reviewed.  Constitutional: No distress.   HENT: Head is atraumatic. Oropharynx is moist.   Eyes: Conjunctivae are normal. Pupils are equal, round, and reactive to light.   Cardiovascular: Normal peripheral perfusion  Respiratory: No respiratory distress.  Abdomen: Diffuse tenderness over superpubic distribution. Well healed prior surgical scar.   : Patient notes bumps on penis and points to non pathological follicles. No testicle pain or swelling. Normal circumcised external male genitalia.  Musculoskeletal: Normal range of motion.   Neurological: Alert. No focal deficits noted.    Skin: No rash.   Psych: Appropriate for clinical situation       DIAGNOSTIC STUDIES / PROCEDURES    LABS  Results for orders placed or performed during the hospital encounter of 04/23/19   URINALYSIS CULTURE, IF INDICATED   Result Value Ref Range    Color Yellow     Character Clear     Specific Gravity 1.024 <1.035    Ph 5.5 5.0 - 8.0    Glucose Negative Negative mg/dL    Ketones Negative Negative mg/dL    Protein Negative Negative mg/dL    Bilirubin Negative Negative    Urobilinogen, Urine 0.2 Negative    Nitrite Negative Negative    Leukocyte Esterase Negative Negative    Occult Blood Negative Negative    Micro Urine Req see below       All labs reviewed by me.    RADIOLOGY  [id number]   Final Result      Small left varicocele      CT-RENAL COLIC EVALUATION(A/P W/O)   Final Result      No evidence of renal, ureteral or bladder calculi. No hydronephrosis.      No acute intra-abdominal abnormality is seen.      The appendix is at the upper limits of normal in diameter without periappendiceal inflammatory changes identified.      Moderate amount of colonic stool.           The radiologist's interpretation of all radiological studies have been reviewed by me.    COURSE & " MEDICAL DECISION MAKING  Nursing notes, VS, PMSFHx reviewed in chart.     Review of past medical records shows the patient was seen 3 times at the beginning of the month with similar symptoms. Several urine tests were negative, STI testing was negative, two scrotal US were normal. CT scan pelvis April 8th shows 3mm calcification margin of urinary bladder which may represent recently passed stone.     9:38 AM - Patient seen and examined at bedside. Presents with penis pain and scrotal skin pain. He additionally has tenderness in his superpubic distribution, without and testicular pain or swelling. Patient will be treated with tylenol 650mg.  Ordered CT renal colic, US scrotum, UA to evaluate his symptoms. The differential diagnoses include but are not limited to: testicular torsion, UTI, ureterolithiasis.     11:38 AM On repeat evaluation, CT scan is unrevealing.  Ultrasound is unrevealing.  Urinalysis is unrevealing.  The patient seems to have chronic pelvic pain.  Etiology unclear.  Patient has essentially a benign exam.  He was previously referred to urology but says that he lost the paperwork.  The patient will be instructed to follow-up with urology once again.  He is already been treated with antibiotics.  Specialty referral is indicated.        The patient will return for new or worsening symptoms and is stable at the time of discharge.    DISPOSITION:  Patient will be discharged home in stable condition.    FOLLOW UP:  [facility], Emergency Dept  [address] [address] [phone]    If symptoms worsen    Jerrell Urban M.D.  [address] [facility] [id number] [phone]    Schedule an appointment as soon as possible for a visit   call today for an appointment. Urologist.      OUTPATIENT MEDICATIONS:  New Prescriptions    No medications on file         FINAL IMPRESSION  1. Other chronic pain    2. Penile pain, chronic          I, Shai Polanco (Scribe), am  scribing for, and in the presence of, Bienvenido Ramires M.D..    Electronically signed by: Shai Polanco (Scribe), 4/23/2019    I, Bienvenido Ramires M.D. personally performed the services described in this documentation, as scribed by Shai Polanco in my presence, and it is both accurate and complete. C.    The note accurately reflects work and decisions made by me.  Bienvenido Ramires  4/23/2019  11:39 AM

## 2019-04-23 NOTE — ED TRIAGE NOTES
Chief Complaint   Patient presents with   • Penis Pain     sx for 3 weeks, seen here for same

## 2019-05-16 ENCOUNTER — APPOINTMENT (OUTPATIENT)
Dept: RADIOLOGY | Facility: MEDICAL CENTER | Age: 34
End: 2019-05-16
Attending: EMERGENCY MEDICINE
Payer: MEDICAID

## 2019-05-16 ENCOUNTER — HOSPITAL ENCOUNTER (EMERGENCY)
Facility: MEDICAL CENTER | Age: 34
End: 2019-05-16
Attending: EMERGENCY MEDICINE
Payer: MEDICAID

## 2019-05-16 VITALS
TEMPERATURE: 97.1 F | HEART RATE: 101 BPM | BODY MASS INDEX: 24.46 KG/M2 | RESPIRATION RATE: 16 BRPM | SYSTOLIC BLOOD PRESSURE: 133 MMHG | HEIGHT: 64 IN | OXYGEN SATURATION: 99 % | WEIGHT: 143.3 LBS | DIASTOLIC BLOOD PRESSURE: 94 MMHG

## 2019-05-16 DIAGNOSIS — N50.82 SCROTAL PAIN: ICD-10-CM

## 2019-05-16 LAB
APPEARANCE UR: CLEAR
BILIRUB UR QL STRIP.AUTO: NEGATIVE
COLOR UR: YELLOW
GLUCOSE UR STRIP.AUTO-MCNC: NEGATIVE MG/DL
KETONES UR STRIP.AUTO-MCNC: NEGATIVE MG/DL
LEUKOCYTE ESTERASE UR QL STRIP.AUTO: NEGATIVE
MICRO URNS: NORMAL
NITRITE UR QL STRIP.AUTO: NEGATIVE
PH UR STRIP.AUTO: 7 [PH]
PROT UR QL STRIP: NEGATIVE MG/DL
RBC UR QL AUTO: NEGATIVE
SP GR UR STRIP.AUTO: 1.02
UROBILINOGEN UR STRIP.AUTO-MCNC: 1 MG/DL

## 2019-05-16 PROCEDURE — 76870 US EXAM SCROTUM: CPT

## 2019-05-16 PROCEDURE — 99283 EMERGENCY DEPT VISIT LOW MDM: CPT

## 2019-05-16 PROCEDURE — 81003 URINALYSIS AUTO W/O SCOPE: CPT

## 2019-05-16 NOTE — DISCHARGE INSTRUCTIONS
Follow-up with urology 5/20/19 at 10am   Return to the ER for fever, swelling, redness, worsening pain, or other concerns

## 2019-05-16 NOTE — ED PROVIDER NOTES
ED Provider Note    Scribed for Autumn Cohen M.D. by Idalia Lyon. 5/16/2019, 9:49 AM.    Primary care provider: Pcp Pt States None  Means of arrival: Walk in   History obtained from: Patient  History limited by: None    CHIEF COMPLAINT  Chief Complaint   Patient presents with   • Testicle Pain     states burning on skin of testicle     HPI  Gerber Langston is a 33 y.o. male who presents to the Emergency Department for constant testicular pain that began one month ago. He describes the pain as being burning and stabbing in quality. His pain is localized to the outside of his scrotum. Denies fever. The patient has been in the ED multiple times for same symptom and has a referral to a urologist; however, he states that his appointment is not until the 10th of next month and has been unable to tolerate the pain in the meantime. Additionally, he states that he was given a topical cream by previous providers without alleviation his symptoms.     REVIEW OF SYSTEMS  Pertinent positives include testicular pain, scrotum pain. Pertinent negatives include no fever. See HPI for further details.     PAST MEDICAL HISTORY   has a past medical history of Diabetes (HCC) and Hepatitis C.    SURGICAL HISTORY   has a past surgical history that includes hernia repair (Left, 4/2016).    SOCIAL HISTORY  Social History   Substance Use Topics   • Smoking status: Current Every Day Smoker     Packs/day: 0.25     Types: Cigarettes   • Smokeless tobacco: Never Used   • Alcohol use Yes      Comment: socially      History   Drug Use No     Comment: hx of meth use, denies use currently     FAMILY HISTORY  None noted.     CURRENT MEDICATIONS  Home Medications     Reviewed by Lenin Baum R.N. (Registered Nurse) on 05/16/19 at 0910  Med List Status: <None>   Medication Last Dose Status   ibuprofen (MOTRIN) 800 MG Tab  Active   NON SPECIFIED  Active              ALLERGIES  No Known Allergies    PHYSICAL EXAM  VITAL SIGNS: /94    "Pulse (!) 101   Temp 36.2 °C (97.1 °F) (Temporal)   Resp 16   Ht 1.626 m (5' 4\")   Wt 65 kg (143 lb 4.8 oz)   SpO2 99%   BMI 24.60 kg/m²     General: WDWN, nontoxic appearing in NAD; A+Ox3; V/S as above. Afebrile.   Skin: Warm and dry; good color; no rash   HEENT: NCAT; EOMs intact; PERRL; no scleral icterus   Neck: FROM; soft  Cardiovascular: Regular heart rate and rhythm. No murmurs, rubs, or gallops; pulses 2+ bilaterally radially and DP areas  Abdomen: BS present; soft; NTND; no rebound, guarding, or rigidity. No organomegaly or pulsatile mass; no CVAT   Extremities: NEWELL x 4; no e/o trauma; no pedal edema   : Left inguinal hernia surgical incision/scar. No erythema, no edema, no lesions. No masses. No perineal redness or crepitus.   Neurologic: CNs III-XII grossly intact; speech clear; distal sensation intact; strength 5/5 UE/LEs  Psychiatric: Appropriate affect, normal mood                             DIAGNOSTIC STUDIES / PROCEDURES    LABS  Results for orders placed or performed during the hospital encounter of 05/16/19   URINALYSIS   Result Value Ref Range    Color Yellow     Character Clear     Specific Gravity 1.019 <1.035    Ph 7.0 5.0 - 8.0    Glucose Negative Negative mg/dL    Ketones Negative Negative mg/dL    Protein Negative Negative mg/dL    Bilirubin Negative Negative    Urobilinogen, Urine 1.0 Negative    Nitrite Negative Negative    Leukocyte Esterase Negative Negative    Occult Blood Negative Negative    Micro Urine Req see below      All labs reviewed by me.    RADIOLOGY  [id number]   Final Result      1.  Normal testicles.   2.  Small left varicocele, stable since prior.        The radiologist's interpretation of all radiological studies have been reviewed by me.    COURSE & MEDICAL DECISION MAKING  Pertinent Labs & Imaging studies reviewed. (See chart for details)    Gerber Langston is a 33 y.o. male who presents complaining of left testicular pain.  There is no evidence of " abscess, necrotizing fasciitis/Faraz's gangrene.  No obvious penile discharge.    9:49 AM - Patient seen and examined at bedside. Ordered US scrotum and urinalysis to evaluate his symptoms.    I advised the patient that his ultrasound was normal with the exception of a small left varicocele.  Urinalysis is negative.  He states he has a urology appointment in several weeks.  I called the urologist's office to establish care and an appointment earlier for the patient.  They stated they can see him on 5/20 at 10 AM.  I went back to the room to advise the patient of this plan and he had left.    DISPOSITION:  Patient will be discharged home in stable condition.    FOLLOW UP:  [facility], Emergency Dept  [address] [facility] [address] [phone]    As needed, If symptoms worsen    UROLOGY [address] [address]  [phone]    You have an appointment on Monday 5/20 at 10 AM    OUTPATIENT MEDICATIONS:  Discharge Medication List as of 5/16/2019 11:22 AM        FINAL IMPRESSION  1. Scrotal pain        Idalia ROWE (Scribe), am scribing for, and in the presence of, Autumn Cohen M.D..    Electronically signed by: Idalia Lyon (Scribbernice), 5/16/2019    Autumn ROWE M.D. personally performed the services described in this documentation, as scribed by Idalia Lyon in my presence, and it is both accurate and complete. C    The note accurately reflects work and decisions made by me.  Autumn Cohen  5/22/2019  11:29 PM

## 2019-05-16 NOTE — ED TRIAGE NOTES
Chief Complaint   Patient presents with   • Testicle Pain     states burning on skin of testicle     Seen here previously for same

## 2019-05-16 NOTE — ED NOTES
Pt left prior to receiving dc instruction. Pt mother notified by phone of change to urology appointment. Pt to follow up with urology as scheduled

## 2019-07-21 ENCOUNTER — APPOINTMENT (OUTPATIENT)
Dept: RADIOLOGY | Facility: MEDICAL CENTER | Age: 34
End: 2019-07-21
Attending: EMERGENCY MEDICINE
Payer: MEDICAID

## 2019-07-21 ENCOUNTER — APPOINTMENT (OUTPATIENT)
Dept: RADIOLOGY | Facility: MEDICAL CENTER | Age: 34
End: 2019-07-21
Payer: MEDICAID

## 2019-07-21 ENCOUNTER — HOSPITAL ENCOUNTER (OUTPATIENT)
Facility: MEDICAL CENTER | Age: 34
End: 2019-07-22
Attending: EMERGENCY MEDICINE | Admitting: HOSPITALIST
Payer: MEDICAID

## 2019-07-21 DIAGNOSIS — R53.1 WEAKNESS: ICD-10-CM

## 2019-07-21 DIAGNOSIS — R33.9 URINARY RETENTION: ICD-10-CM

## 2019-07-21 PROBLEM — R29.898 WEAKNESS OF BOTH LOWER EXTREMITIES: Status: ACTIVE | Noted: 2019-07-21

## 2019-07-21 PROBLEM — F15.10 METHAMPHETAMINE ABUSE (HCC): Status: ACTIVE | Noted: 2019-07-21

## 2019-07-21 PROBLEM — E10.65 UNCONTROLLED TYPE 1 DIABETES MELLITUS WITH HYPERGLYCEMIA (HCC): Status: ACTIVE | Noted: 2019-07-21

## 2019-07-21 PROBLEM — E10.9 TYPE 1 DIABETES MELLITUS WITHOUT COMPLICATION (HCC): Status: RESOLVED | Noted: 2017-07-15 | Resolved: 2019-07-21

## 2019-07-21 LAB
AMPHET UR QL SCN: POSITIVE
ANION GAP SERPL CALC-SCNC: 8 MMOL/L (ref 0–11.9)
APPEARANCE UR: CLEAR
BARBITURATES UR QL SCN: NEGATIVE
BASOPHILS # BLD AUTO: 0.5 % (ref 0–1.8)
BASOPHILS # BLD: 0.04 K/UL (ref 0–0.12)
BENZODIAZ UR QL SCN: NEGATIVE
BILIRUB UR QL STRIP.AUTO: NEGATIVE
BUN SERPL-MCNC: 13 MG/DL (ref 8–22)
BURR CELLS/RBC NFR CSF MANUAL: 0 %
BZE UR QL SCN: NEGATIVE
CALCIUM SERPL-MCNC: 9.1 MG/DL (ref 8.5–10.5)
CANNABINOIDS UR QL SCN: NEGATIVE
CHLORIDE SERPL-SCNC: 100 MMOL/L (ref 96–112)
CLARITY CSF: CLEAR
CO2 SERPL-SCNC: 27 MMOL/L (ref 20–33)
COLOR CSF: COLORLESS
COLOR SPUN CSF: COLORLESS
COLOR UR: YELLOW
CREAT SERPL-MCNC: 0.66 MG/DL (ref 0.5–1.4)
CSF COMMENTS 1658: NORMAL
EOSINOPHIL # BLD AUTO: 0.11 K/UL (ref 0–0.51)
EOSINOPHIL NFR BLD: 1.4 % (ref 0–6.9)
ERYTHROCYTE [DISTWIDTH] IN BLOOD BY AUTOMATED COUNT: 43.3 FL (ref 35.9–50)
GLUCOSE BLD-MCNC: 244 MG/DL (ref 65–99)
GLUCOSE BLD-MCNC: 277 MG/DL (ref 65–99)
GLUCOSE CSF-MCNC: 118 MG/DL (ref 40–80)
GLUCOSE SERPL-MCNC: 265 MG/DL (ref 65–99)
GLUCOSE UR STRIP.AUTO-MCNC: >=1000 MG/DL
GRAM STN SPEC: NORMAL
HCT VFR BLD AUTO: 45.7 % (ref 42–52)
HGB BLD-MCNC: 15.2 G/DL (ref 14–18)
HIV 1+2 AB+HIV1 P24 AG SERPL QL IA: NON REACTIVE
HIV 1+2 AB+HIV1 P24 AG SERPL QL IA: NON REACTIVE
IMM GRANULOCYTES # BLD AUTO: 0.01 K/UL (ref 0–0.11)
IMM GRANULOCYTES NFR BLD AUTO: 0.1 % (ref 0–0.9)
INDIA INK PREP CSF: NORMAL
KETONES UR STRIP.AUTO-MCNC: NEGATIVE MG/DL
LEUKOCYTE ESTERASE UR QL STRIP.AUTO: NEGATIVE
LYMPHOCYTES # BLD AUTO: 1.37 K/UL (ref 1–4.8)
LYMPHOCYTES NFR BLD: 18 % (ref 22–41)
LYMPHOCYTES NFR CSF: 70 %
MCH RBC QN AUTO: 29.3 PG (ref 27–33)
MCHC RBC AUTO-ENTMCNC: 33.3 G/DL (ref 33.7–35.3)
MCV RBC AUTO: 88.2 FL (ref 81.4–97.8)
METHADONE UR QL SCN: NEGATIVE
MICRO URNS: ABNORMAL
MONOCYTES # BLD AUTO: 0.58 K/UL (ref 0–0.85)
MONOCYTES NFR BLD AUTO: 7.6 % (ref 0–13.4)
MONONUC CELLS NFR CSF: 27 %
NEUTROPHILS # BLD AUTO: 5.52 K/UL (ref 1.82–7.42)
NEUTROPHILS NFR BLD: 72.4 % (ref 44–72)
NEUTROPHILS NFR CSF: 2 %
NITRITE UR QL STRIP.AUTO: NEGATIVE
NRBC # BLD AUTO: 0 K/UL
NRBC BLD-RTO: 0 /100 WBC
OPIATES UR QL SCN: POSITIVE
OTHER CELLS CSF: 1 %
OXYCODONE UR QL SCN: NEGATIVE
PCP UR QL SCN: NEGATIVE
PH UR STRIP.AUTO: 6 [PH]
PLATELET # BLD AUTO: 291 K/UL (ref 164–446)
PMV BLD AUTO: 10.7 FL (ref 9–12.9)
POTASSIUM SERPL-SCNC: 3.9 MMOL/L (ref 3.6–5.5)
PROPOXYPH UR QL SCN: NEGATIVE
PROT CSF-MCNC: 71 MG/DL (ref 15–45)
PROT UR QL STRIP: NEGATIVE MG/DL
RBC # BLD AUTO: 5.18 M/UL (ref 4.7–6.1)
RBC # CSF: <1 CELLS/UL
RBC UR QL AUTO: NEGATIVE
SIGNIFICANT IND 70042: NORMAL
SIGNIFICANT IND 70042: NORMAL
SITE SITE: NORMAL
SITE SITE: NORMAL
SODIUM SERPL-SCNC: 135 MMOL/L (ref 135–145)
SOURCE SOURCE: NORMAL
SOURCE SOURCE: NORMAL
SP GR UR STRIP.AUTO: 1.01
SPECIMEN VOL CSF: 14 ML
TUBE # CSF: 3
TUBE # CSF: 4
UROBILINOGEN UR STRIP.AUTO-MCNC: 0.2 MG/DL
WBC # BLD AUTO: 7.6 K/UL (ref 4.8–10.8)
WBC # CSF: 1 CELLS/UL (ref 0–10)

## 2019-07-21 PROCEDURE — 96372 THER/PROPH/DIAG INJ SC/IM: CPT

## 2019-07-21 PROCEDURE — 36415 COLL VENOUS BLD VENIPUNCTURE: CPT

## 2019-07-21 PROCEDURE — 86694 HERPES SIMPLEX NES ANTBDY: CPT

## 2019-07-21 PROCEDURE — 62270 DX LMBR SPI PNXR: CPT | Performed by: HOSPITALIST

## 2019-07-21 PROCEDURE — 72158 MRI LUMBAR SPINE W/O & W/DYE: CPT

## 2019-07-21 PROCEDURE — 82784 ASSAY IGA/IGD/IGG/IGM EACH: CPT | Mod: 91

## 2019-07-21 PROCEDURE — A9585 GADOBUTROL INJECTION: HCPCS | Performed by: EMERGENCY MEDICINE

## 2019-07-21 PROCEDURE — 99152 MOD SED SAME PHYS/QHP 5/>YRS: CPT | Performed by: HOSPITALIST

## 2019-07-21 PROCEDURE — G0378 HOSPITAL OBSERVATION PER HR: HCPCS

## 2019-07-21 PROCEDURE — 82962 GLUCOSE BLOOD TEST: CPT

## 2019-07-21 PROCEDURE — 700105 HCHG RX REV CODE 258: Performed by: HOSPITALIST

## 2019-07-21 PROCEDURE — 84157 ASSAY OF PROTEIN OTHER: CPT

## 2019-07-21 PROCEDURE — 87389 HIV-1 AG W/HIV-1&-2 AB AG IA: CPT | Mod: 91

## 2019-07-21 PROCEDURE — 99243 OFF/OP CNSLTJ NEW/EST LOW 30: CPT

## 2019-07-21 PROCEDURE — 87070 CULTURE OTHR SPECIMN AEROBIC: CPT

## 2019-07-21 PROCEDURE — 86788 WEST NILE VIRUS AB IGM: CPT

## 2019-07-21 PROCEDURE — 303105 HCHG CATHETER EXTRA

## 2019-07-21 PROCEDURE — 96375 TX/PRO/DX INJ NEW DRUG ADDON: CPT

## 2019-07-21 PROCEDURE — 700102 HCHG RX REV CODE 250 W/ 637 OVERRIDE(OP): Performed by: HOSPITALIST

## 2019-07-21 PROCEDURE — 86789 WEST NILE VIRUS ANTIBODY: CPT

## 2019-07-21 PROCEDURE — 86592 SYPHILIS TEST NON-TREP QUAL: CPT

## 2019-07-21 PROCEDURE — 700111 HCHG RX REV CODE 636 W/ 250 OVERRIDE (IP): Performed by: HOSPITALIST

## 2019-07-21 PROCEDURE — 85025 COMPLETE CBC W/AUTO DIFF WBC: CPT

## 2019-07-21 PROCEDURE — 51702 INSERT TEMP BLADDER CATH: CPT | Mod: XU

## 2019-07-21 PROCEDURE — 87205 SMEAR GRAM STAIN: CPT

## 2019-07-21 PROCEDURE — 96374 THER/PROPH/DIAG INJ IV PUSH: CPT

## 2019-07-21 PROCEDURE — 82945 GLUCOSE OTHER FLUID: CPT

## 2019-07-21 PROCEDURE — 86651 ENCEPHALITIS CALIFORN ANTBDY: CPT | Mod: 91

## 2019-07-21 PROCEDURE — 82042 OTHER SOURCE ALBUMIN QUAN EA: CPT

## 2019-07-21 PROCEDURE — 81003 URINALYSIS AUTO W/O SCOPE: CPT

## 2019-07-21 PROCEDURE — 87799 DETECT AGENT NOS DNA QUANT: CPT

## 2019-07-21 PROCEDURE — 80307 DRUG TEST PRSMV CHEM ANLYZR: CPT

## 2019-07-21 PROCEDURE — 87015 SPECIMEN INFECT AGNT CONCNTJ: CPT

## 2019-07-21 PROCEDURE — 89051 BODY FLUID CELL COUNT: CPT

## 2019-07-21 PROCEDURE — 700117 HCHG RX CONTRAST REV CODE 255: Performed by: EMERGENCY MEDICINE

## 2019-07-21 PROCEDURE — 82040 ASSAY OF SERUM ALBUMIN: CPT

## 2019-07-21 PROCEDURE — 80048 BASIC METABOLIC PNL TOTAL CA: CPT

## 2019-07-21 PROCEDURE — 83873 ASSAY OF CSF PROTEIN: CPT

## 2019-07-21 PROCEDURE — 83036 HEMOGLOBIN GLYCOSYLATED A1C: CPT

## 2019-07-21 PROCEDURE — 82164 ANGIOTENSIN I ENZYME TEST: CPT

## 2019-07-21 PROCEDURE — 72157 MRI CHEST SPINE W/O & W/DYE: CPT

## 2019-07-21 PROCEDURE — 83916 OLIGOCLONAL BANDS: CPT

## 2019-07-21 PROCEDURE — 86653 ENCEPHALTIS ST LOUIS ANTBODY: CPT | Mod: 91

## 2019-07-21 PROCEDURE — 86652 ENCEPHALTIS EAST EQNE ANBDY: CPT | Mod: 91

## 2019-07-21 PROCEDURE — 87116 MYCOBACTERIA CULTURE: CPT

## 2019-07-21 PROCEDURE — 86654 ENCEPHALTIS WEST EQNE ANTBDY: CPT | Mod: 91

## 2019-07-21 PROCEDURE — 96365 THER/PROPH/DIAG IV INF INIT: CPT | Mod: XU

## 2019-07-21 PROCEDURE — 87206 SMEAR FLUORESCENT/ACID STAI: CPT

## 2019-07-21 PROCEDURE — 99220 PR INITIAL OBSERVATION CARE,LEVL III: CPT | Mod: 25 | Performed by: HOSPITALIST

## 2019-07-21 PROCEDURE — 99285 EMERGENCY DEPT VISIT HI MDM: CPT

## 2019-07-21 RX ORDER — GADOBUTROL 604.72 MG/ML
6 INJECTION INTRAVENOUS ONCE
Status: COMPLETED | OUTPATIENT
Start: 2019-07-21 | End: 2019-07-21

## 2019-07-21 RX ORDER — INSULIN GLARGINE 100 [IU]/ML
20 INJECTION, SOLUTION SUBCUTANEOUS EVERY EVENING
Status: DISCONTINUED | OUTPATIENT
Start: 2019-07-21 | End: 2019-07-22 | Stop reason: HOSPADM

## 2019-07-21 RX ORDER — POLYETHYLENE GLYCOL 3350 17 G/17G
1 POWDER, FOR SOLUTION ORAL
Status: DISCONTINUED | OUTPATIENT
Start: 2019-07-21 | End: 2019-07-22 | Stop reason: HOSPADM

## 2019-07-21 RX ORDER — MIDAZOLAM HYDROCHLORIDE 1 MG/ML
1-2 INJECTION INTRAMUSCULAR; INTRAVENOUS ONCE
Status: COMPLETED | OUTPATIENT
Start: 2019-07-21 | End: 2019-07-21

## 2019-07-21 RX ORDER — ACETAMINOPHEN 325 MG/1
650 TABLET ORAL EVERY 6 HOURS PRN
Status: DISCONTINUED | OUTPATIENT
Start: 2019-07-21 | End: 2019-07-22 | Stop reason: HOSPADM

## 2019-07-21 RX ORDER — AMOXICILLIN 250 MG
2 CAPSULE ORAL 2 TIMES DAILY
Status: DISCONTINUED | OUTPATIENT
Start: 2019-07-21 | End: 2019-07-22 | Stop reason: HOSPADM

## 2019-07-21 RX ORDER — BISACODYL 10 MG
10 SUPPOSITORY, RECTAL RECTAL
Status: DISCONTINUED | OUTPATIENT
Start: 2019-07-21 | End: 2019-07-22 | Stop reason: HOSPADM

## 2019-07-21 RX ADMIN — ACYCLOVIR SODIUM 635 MG: 500 INJECTION, SOLUTION INTRAVENOUS at 21:15

## 2019-07-21 RX ADMIN — INSULIN HUMAN 5 UNITS: 100 INJECTION, SOLUTION PARENTERAL at 21:12

## 2019-07-21 RX ADMIN — INSULIN GLARGINE 20 UNITS: 100 INJECTION, SOLUTION SUBCUTANEOUS at 18:14

## 2019-07-21 RX ADMIN — GADOBUTROL 6 ML: 604.72 INJECTION INTRAVENOUS at 12:30

## 2019-07-21 RX ADMIN — FENTANYL CITRATE 50 MCG: 0.05 INJECTION, SOLUTION INTRAMUSCULAR; INTRAVENOUS at 15:27

## 2019-07-21 RX ADMIN — MIDAZOLAM HYDROCHLORIDE 1 MG: 1 INJECTION, SOLUTION INTRAMUSCULAR; INTRAVENOUS at 15:28

## 2019-07-21 ASSESSMENT — ENCOUNTER SYMPTOMS
WEAKNESS: 1
SHORTNESS OF BREATH: 0
CHILLS: 0
FEVER: 0

## 2019-07-21 ASSESSMENT — COGNITIVE AND FUNCTIONAL STATUS - GENERAL
SUGGESTED CMS G CODE MODIFIER DAILY ACTIVITY: CH
MOBILITY SCORE: 24
SUGGESTED CMS G CODE MODIFIER MOBILITY: CH
DAILY ACTIVITIY SCORE: 24

## 2019-07-21 ASSESSMENT — PATIENT HEALTH QUESTIONNAIRE - PHQ9
SUM OF ALL RESPONSES TO PHQ9 QUESTIONS 1 AND 2: 0
2. FEELING DOWN, DEPRESSED, IRRITABLE, OR HOPELESS: NOT AT ALL
2. FEELING DOWN, DEPRESSED, IRRITABLE, OR HOPELESS: NOT AT ALL
SUM OF ALL RESPONSES TO PHQ9 QUESTIONS 1 AND 2: 0
1. LITTLE INTEREST OR PLEASURE IN DOING THINGS: NOT AT ALL
1. LITTLE INTEREST OR PLEASURE IN DOING THINGS: NOT AT ALL

## 2019-07-21 ASSESSMENT — COPD QUESTIONNAIRES
DO YOU EVER COUGH UP ANY MUCUS OR PHLEGM?: NO/ONLY WITH OCCASIONAL COLDS OR INFECTIONS
HAVE YOU SMOKED AT LEAST 100 CIGARETTES IN YOUR ENTIRE LIFE: YES
DURING THE PAST 4 WEEKS HOW MUCH DID YOU FEEL SHORT OF BREATH: NONE/LITTLE OF THE TIME
IN THE PAST 12 MONTHS DO YOU DO LESS THAN YOU USED TO BECAUSE OF YOUR BREATHING PROBLEMS: DISAGREE/UNSURE

## 2019-07-21 ASSESSMENT — LIFESTYLE VARIABLES
EVER_SMOKED: YES
ALCOHOL_USE: NO

## 2019-07-21 NOTE — ED TRIAGE NOTES
Chief Complaint   Patient presents with   • Unable to Urinate     states last urination 2 days ago

## 2019-07-21 NOTE — ED NOTES
"Pt states \"my legs barley work,\" and per family had to crawl to the bathroom.  Pt has pain in his low back when moving his legs.  "

## 2019-07-21 NOTE — PROGRESS NOTES
Brief Neurology Progress Note     Reviewed MRI T and L spine: abnormal T2 signal hyperintensities  In T4 nd T5 with no contrast  uptake.  R paraspinal muscle enlargement and enhancement.  Diff diagnosis includes viral etiology such as VZV vs demyelination     Plan  MRI brain w and wo   MRI C spine w and wo   Consult ID   I would cover for VZV empirically   LP pending send all viral panel   Send HIV, Lyme   Labs ordered by me   Consider steroids however he is type 1 DM will need ample insulin coverage   Acyclovir IV   Consult ID     Neurology will resume to follow tomorrow am.      Ellen Fry MD PhD   Board Certified Neurologist   Behavioral Neurologist

## 2019-07-21 NOTE — ED PROVIDER NOTES
ED Provider Note    CHIEF COMPLAINT  Chief Complaint   Patient presents with   • Unable to Urinate     states last urination 2 days ago       HPI  Gerber Langston is a 33 y.o. male who presents with urinary retention.  States that over the last 2 days he has had a very difficult time urinating.  Only droplets of come out and it progressed to the point where he was unable to urinate at all.  He complains of severe pain in the suprapubic region from needing to urinate.  A Cordero catheter was placed.  He reports that this relieved bladder symptoms.  He states that over the last 2 days in addition to his urinary retention his legs feel weak.  He has basically been crawling on the ground.  He has tingling on his feet and lower legs.  He is never had this before.  He denies a fever.  He says when he pushes down forcefully on his feet he has some pain in the back, but otherwise no back pain.  He denies upper externally weakness numbness slurred speech or headache.  He denies having a fever, but has had some sweats periodically.    Patient does have a history of chronic testicular and penis pain of unclear etiology.  He has been seen by urology.  He has been referred to pain management for nerve ablation.  He states that these symptoms are different than before.    Has a history of diabetes.  Reports that he has been taking his medications.    REVIEW OF SYSTEMS  As per HPI, otherwise a 10 point review of systems is negative    PAST MEDICAL HISTORY  Past Medical History:   Diagnosis Date   • Diabetes (HCC)     type 1   • Hepatitis C        SOCIAL HISTORY  Social History   Substance Use Topics   • Smoking status: Current Every Day Smoker     Packs/day: 0.25     Types: Cigarettes   • Smokeless tobacco: Never Used   • Alcohol use Yes      Comment: socially       SURGICAL HISTORY  Past Surgical History:   Procedure Laterality Date   • HERNIA REPAIR Left 4/2016    inguinal       CURRENT MEDICATIONS  Home Medications    **Home  medications have not yet been reviewed for this encounter**         ALLERGIES  No Known Allergies    PHYSICAL EXAM  VITAL SIGNS: /75   Pulse 66   Temp 36.4 °C (97.5 °F) (Temporal)   Resp 16   Wt 63.5 kg (140 lb)   SpO2 99%   BMI 24.03 kg/m²    Constitutional: Awake and alert.  Face and neck tattoos  HENT:  Atraumatic, Normocephalic.Oropharynx dry mucus membranes, missing several teeth, nose normal inspection.   Eyes: Normal inspection  Neck: Supple  Cardiovascular: Normal heart rate, Normal rhythm.  Symmetric peripheral pulses.   Thorax & Lungs: No respiratory distress, No wheezing, No rales, No rhonchi, No chest tenderness.   Abdomen: Bowel sounds normal, soft, non-distended, nontender, no mass, left inguinal hernia scar  Rectal: Normal size prostate which is nontender.  Skin: Warm, Dry, No rash.   Back: No tenderness, No CVA tenderness.   Extremities: No clubbing, cyanosis, edema, no Homans or cords   Neurologic: Awake alert oriented.  Cranial nerves are intact.  Good strength in the upper extremities and normal sensory in the upper extremities.  He has 3/5 strength on plantar flexion bilateral feet.  4/5 strength dorsiflexion of the great toe.  Weakness of the quadriceps and hip flexors is noted as well.  Describes sensory disturbance over bilateral lower extremities to about the mid calf level.  There is normal sensory around the genitals and he has normal rectal tone.  Psychiatric: Anxious appearing      MR-THORACIC SPINE-WITH & W/O    (Results Pending)   MR-LUMBAR SPINE-WITH & W/O    (Results Pending)         Labs:  Results for orders placed or performed during the hospital encounter of 07/21/19   URINALYSIS (UA)   Result Value Ref Range    Color Yellow     Character Clear     Specific Gravity 1.013 <1.035    Ph 6.0 5.0 - 8.0    Glucose >=1000 (A) Negative mg/dL    Ketones Negative Negative mg/dL    Protein Negative Negative mg/dL    Bilirubin Negative Negative    Urobilinogen, Urine 0.2 Negative     Nitrite Negative Negative    Leukocyte Esterase Negative Negative    Occult Blood Negative Negative    Micro Urine Req see below    CBC WITH DIFFERENTIAL   Result Value Ref Range    WBC 7.6 4.8 - 10.8 K/uL    RBC 5.18 4.70 - 6.10 M/uL    Hemoglobin 15.2 14.0 - 18.0 g/dL    Hematocrit 45.7 42.0 - 52.0 %    MCV 88.2 81.4 - 97.8 fL    MCH 29.3 27.0 - 33.0 pg    MCHC 33.3 (L) 33.7 - 35.3 g/dL    RDW 43.3 35.9 - 50.0 fL    Platelet Count 291 164 - 446 K/uL    MPV 10.7 9.0 - 12.9 fL    Neutrophils-Polys 72.40 (H) 44.00 - 72.00 %    Lymphocytes 18.00 (L) 22.00 - 41.00 %    Monocytes 7.60 0.00 - 13.40 %    Eosinophils 1.40 0.00 - 6.90 %    Basophils 0.50 0.00 - 1.80 %    Immature Granulocytes 0.10 0.00 - 0.90 %    Nucleated RBC 0.00 /100 WBC    Neutrophils (Absolute) 5.52 1.82 - 7.42 K/uL    Lymphs (Absolute) 1.37 1.00 - 4.80 K/uL    Monos (Absolute) 0.58 0.00 - 0.85 K/uL    Eos (Absolute) 0.11 0.00 - 0.51 K/uL    Baso (Absolute) 0.04 0.00 - 0.12 K/uL    Immature Granulocytes (abs) 0.01 0.00 - 0.11 K/uL    NRBC (Absolute) 0.00 K/uL   BASIC METABOLIC PANEL   Result Value Ref Range    Sodium 135 135 - 145 mmol/L    Potassium 3.9 3.6 - 5.5 mmol/L    Chloride 100 96 - 112 mmol/L    Co2 27 20 - 33 mmol/L    Glucose 265 (H) 65 - 99 mg/dL    Bun 13 8 - 22 mg/dL    Creatinine 0.66 0.50 - 1.40 mg/dL    Calcium 9.1 8.5 - 10.5 mg/dL    Anion Gap 8.0 0.0 - 11.9   URINE DRUG SCREEN   Result Value Ref Range    Amphetamines Urine Positive (A) Negative    Barbiturates Negative Negative    Benzodiazepines Negative Negative    Cocaine Metabolite Negative Negative    Methadone Negative Negative    Opiates Positive (A) Negative    Oxycodone Negative Negative    Phencyclidine -Pcp Negative Negative    Propoxyphene Negative Negative    Cannabinoid Metab Negative Negative   ESTIMATED GFR   Result Value Ref Range    GFR If African American >60 >60 mL/min/1.73 m 2    GFR If Non African American >60 >60 mL/min/1.73 m 2   ACCU-CHEK GLUCOSE    Result Value Ref Range    Glucose - Accu-Ck 244 (H) 65 - 99 mg/dL       COURSE & MEDICAL DECISION MAKING  Patient presents to the emergency department with urinary retention.  He also reports weakness in his lower extremities.  He has motor deficits and I cannot elicit DTRs on examination.  He is afebrile.  He does have a history of methamphetamine abuse although he denied it currently.  A Cordero catheter was placed.  He had about 600 cc of urine.  A rectal examination did not show an enlarged prostate.  He had normal tone and sensory around the perineum and anus.    I ordered laboratory data.  This returned positive for methamphetamines otherwise was unremarkable.    I consulted neurology.  Requested MRI with and without contrast of the thoracic and lumbar spine.  These tests were ordered.  The patient was seen.  The patient will need to be admitted to the hospital regardless of findings on MRI.  If MRI is negative LP was recommended.  I consulted Dr. Simon for admission.    FINAL IMPRESSION  1.  Urinary retention  2.  Lower extremity weakness       This dictation was created using voice recognition software. The accuracy of the dictation is limited to the abilities of the software.  The nursing notes were reviewed and certain aspects of this information were incorporated into this note.      Electronically signed by: Olu Gooden, 7/21/2019 9:45 AM

## 2019-07-21 NOTE — PROCEDURES
Procedure Lumbar Puncture  Date/Time: 7/21/2019 3:43 PM  Performed by: ROSALINA PICKARD  Authorized by: ROSALINA PICKARD     Consent:     Consent obtained:  Written    Consent given by:  Patient    Risks discussed:  Bleeding, pain and headache    Alternatives discussed:  No treatment  Universal protocol:     Procedure explained and questions answered to patient or proxy's satisfaction: yes      Relevant documents present and verified: yes      Test results available and properly labeled: yes      Imaging studies available: yes      Required blood products, implants, devices, and special equipment available: yes      Immediately prior to procedure a time out was called: yes      Site/side marked: yes      Patient identity confirmed:  Verbally with patient and hospital-assigned identification number  Pre-procedure details:     Procedure purpose:  Diagnostic    Preparation: Patient was prepped and draped in usual sterile fashion    Sedation:     Sedation type:  Moderate (conscious) sedation  Anesthesia:     Anesthesia method:  Local infiltration    Local anesthetic:  Lidocaine 1% w/o epi  Procedure details:     Lumbar space:  L3-L4 interspace    Patient position:  Sitting    Needle gauge:  20    Needle type:  Anne point    Needle length (in):  3.5    Ultrasound guidance: no      Number of attempts:  1    Opening pressure (cm H2O):  0    Fluid appearance:  Clear    Tubes of fluid:  4    Total volume (ml):  14  Post-procedure:     Puncture site:  Adhesive bandage applied  Comments:      He received 1 mg IV versed and 50 mcg IV fentanyl for conscious sedation  The procedure started at 15:21 and finished at 15:29  I personally monitored him until 15:40 due to conscious sedation.

## 2019-07-21 NOTE — CONSULTS
"Neurology Consultation Note     Consulted by Olu Gooden MD     CC: \" I can not pee and my legs are painful and weak\"       HPI:  32 yo M with type 1 IDDM and hepatitis C presents with urinary retention and bilateral leg weakness starting 2 days ago. He reports waking up in the morning and falling out of his bed with his mom having to help him up. He noticed inability to urinate and only droplets coming out. He reports longstanding tingling in his feet on and off however denies ever having the workup or receiving the diagnosis of diabetic neuropathy.  Reports pain in both legs starting from his hips and radiating down. No pain in hs lower back or other parts of his spine.  Reports extreme sensitivity to touch in both legs more so in both calves.   He reports sensation in his scrotal area and penis and tingling starting from his hips and radiating down. He does have history of testicular pain and penis pain and some trouble with urination in the past. He had been referred to urology in the past.  He denies any weakness pain or tingling in his arms and has no headache,diplopia, facial droop etc.   He adamantly denied using drugs or drinking.  He denied smoking cigarettes or using marijuana.  No fevers, no weight loss  No sick contacts   Complains of night sweats.      ROS:   Constitutional: No fevers or chills. Night sweats +  Eyes: No blurry vision or eye pain.  ENT: No dysphagia or hearing loss.  Respiratory: No cough or shortness of breath.  Cardiovascular: No chest pain or palpitations.  GI: No nausea, vomiting, or diarrhea.  : inability to urinate   Musculoskeletal: No joint swelling or arthralgias.  Skin: No skin rashes.  Neuro: No headaches, dizziness, or tremors.per HPI weakness in legs   Endocrine: No heat or cold intolerance. No polydipsia or polyuria.  Psych: No depression or anxiety.  Heme/Lymph: No easy bruising or swollen lymph nodes.      Past Medical History:   Past Medical History:   Diagnosis " Date   • Diabetes (HCC)     type 1   • Hepatitis C        Past Surgical History:   Past Surgical History:   Procedure Laterality Date   • HERNIA REPAIR Left 4/2016    inguinal     Social History:   Social History     Social History   • Marital status: Single     Spouse name: N/A   • Number of children: N/A   • Years of education: N/A     Occupational History   • Not on file.     Social History Main Topics   • Smoking status: Current Every Day Smoker     Packs/day: 0.25     Types: Cigarettes   • Smokeless tobacco: Never Used   • Alcohol use Yes      Comment: socially   • Drug use: No      Comment: hx of meth use, denies use currently   • Sexual activity: Not on file     Other Topics Concern   • Not on file     Social History Narrative   • No narrative on file       Family History:   No family history on file.    Allergies:  Patient has no known allergies.  Physical Exam:     Ambulatory Vitals  Encounter Vitals  Temperature: 36.4 °C (97.5 °F)  Temp src: Temporal  Blood Pressure: 116/75  BP Location: Right, Upper Arm  Patient BP Position: Sitting  Pulse: 63  Respiration: 16  Pulse Oximetry: 96 %  Weight: 63.5 kg (140 lb)  Weight Source: Stand Up Scale    Constitutional: in mild distress   Appears well nourished  Multiple tattoo marks   Poor dentition   Cardiovascular: RRR, with no murmurs, rubs or gallops. No carotid bruits.   Respiratory: Lungs CTA B/L, no W/R/R.   Abdomen: Soft Non-tender to Palpation. Non-distended.  Extremities: No peripheral edema, pedal pulses intact.   Skin: Warm, dry, intact. No rashes observed.  Eyes: Sclera anicteric   Funduscopic: Optic discs flat with no efect.    CN III, IV, VI: Good eye closure, EOMIvidence of papilledema or pallor.   Neurologic:   Mental Status: Awake, alert, oriented x 3.   Speech: Fluent with normal prosody.   Memory: Able to recall recent and remote events accurately.    Concentration: Attentive. Able to focus on history and follow multi-step commands.               Fund of Knowledge: Appropriate   Cranial Nerves:    CN II: PERRL. No afferent pupillary de.     CN V: Facial sensation intact and symmetric.     CN VII: No facial asymmetry.     CN VIII: Hearing intact.     CN IX and X: Palate elevates symmetrically. Normal gag reflex.    CN XI: Symmetric shoulder shrug.     CN XII: Tongue midline.    Sensory: allodynia to LE below hips bilaterally   No clear sensory level   Intact to T and P   PP hyperesthesia    Coordination: No evidence of past-pointing on finger to nose testing, no dysdiadochokinesia. Heel to shin difficult given weakness              DTR's: trace in b/l LE 1+ in b/l UE    Babinski: Toes downgoing bilaterally.   Movements: No tremors observed.   Musculoskeletal:    Strength: 5/5 in upper extremities bilaterally. 3/5 in bilateral LE    Gait: deferred    Tone: Normal bulk and tone.   Joints: No swelling.     Labs:  UDS + for amphetamines and opioids     Recent Labs      07/21/19   0033   WBC  7.6   RBC  5.18   HEMOGLOBIN  15.2   HEMATOCRIT  45.7   MCV  88.2   MCH  29.3   MCHC  33.3*   RDW  43.3   PLATELETCT  291   MPV  10.7     Recent Labs      07/21/19   0033   SODIUM  135   POTASSIUM  3.9   CHLORIDE  100   CO2  27   GLUCOSE  265*   BUN  13   CREATININE  0.66   CALCIUM  9.1                     Recent Labs      07/21/19   0033   SODIUM  135   POTASSIUM  3.9   CHLORIDE  100   CO2  27   GLUCOSE  265*   BUN  13     Recent Labs      07/21/19   0033   SODIUM  135   POTASSIUM  3.9   CHLORIDE  100   CO2  27   BUN  13   CREATININE  0.66   CALCIUM  9.1         No results found for this or any previous visit.        Imaging:   None for my review     Assessment/Plan:  34 yo male with type 1 insulin dependent DM, history of methamphetamine use (denies use now however UDS + fr amphetamines and opioids) presenting with acute onset bilateral LE weakness, pain and inability to urinate.  Diff diagnosis includes epidural spinal abscess or other spinal pathology including  myelomalacia or infection/inflammation such as transverse myelitis (although lacking fever and typical pain in spine) vs neuropathy involving LE nerves and bladder control vs less likely autoimmune condition such as GBS vs other.  Plan  MRI T and L spine with and without contrast   If above unrevealing- proceed with CSF for protein, glucose,differential,cytology, OCB,MBP IgG index, viral panel gram stain and culture and albuminocytological dissociation.  EMG/NCS may be needed   Check A1C,TSH,B12, Sed rate and CRP  KATERINA, RF,anticardiolipid Ab  Check Hep C and liver function   Check cryoglobulins and serum protein electrophoresis if no alternative explanation for his sx  Urology consultation   Neurology will continue to follow. Thank you for consulting our service.    Ellen Fry MD PhD   Board Certified Neurologist   Behavioral Neurologist

## 2019-07-22 ENCOUNTER — APPOINTMENT (OUTPATIENT)
Dept: RADIOLOGY | Facility: MEDICAL CENTER | Age: 34
End: 2019-07-22
Payer: MEDICAID

## 2019-07-22 ENCOUNTER — TELEPHONE (OUTPATIENT)
Dept: SCHEDULING | Facility: IMAGING CENTER | Age: 34
End: 2019-07-22

## 2019-07-22 ENCOUNTER — HOSPITAL ENCOUNTER (OUTPATIENT)
Dept: RADIOLOGY | Facility: MEDICAL CENTER | Age: 34
End: 2019-07-22
Payer: MEDICAID

## 2019-07-22 VITALS
TEMPERATURE: 99 F | BODY MASS INDEX: 24.03 KG/M2 | HEART RATE: 85 BPM | RESPIRATION RATE: 16 BRPM | DIASTOLIC BLOOD PRESSURE: 84 MMHG | SYSTOLIC BLOOD PRESSURE: 131 MMHG | WEIGHT: 140 LBS | OXYGEN SATURATION: 98 %

## 2019-07-22 PROBLEM — R33.9 URINARY RETENTION: Status: RESOLVED | Noted: 2019-07-21 | Resolved: 2019-07-22

## 2019-07-22 PROBLEM — R29.898 WEAKNESS OF BOTH LOWER EXTREMITIES: Status: RESOLVED | Noted: 2019-07-21 | Resolved: 2019-07-22

## 2019-07-22 LAB
EST. AVERAGE GLUCOSE BLD GHB EST-MCNC: 223 MG/DL
GLUCOSE BLD-MCNC: 244 MG/DL (ref 65–99)
GLUCOSE BLD-MCNC: 256 MG/DL (ref 65–99)
GLUCOSE BLD-MCNC: 266 MG/DL (ref 65–99)
HBA1C MFR BLD: 9.4 % (ref 0–5.6)
RHODAMINE-AURAMINE STN SPEC: NORMAL
SIGNIFICANT IND 70042: NORMAL
SITE SITE: NORMAL
SOURCE SOURCE: NORMAL

## 2019-07-22 PROCEDURE — 99217 PR OBSERVATION CARE DISCHARGE: CPT | Performed by: HOSPITALIST

## 2019-07-22 PROCEDURE — 700102 HCHG RX REV CODE 250 W/ 637 OVERRIDE(OP): Performed by: HOSPITALIST

## 2019-07-22 PROCEDURE — 700105 HCHG RX REV CODE 258: Performed by: HOSPITALIST

## 2019-07-22 PROCEDURE — 96366 THER/PROPH/DIAG IV INF ADDON: CPT

## 2019-07-22 PROCEDURE — 96372 THER/PROPH/DIAG INJ SC/IM: CPT

## 2019-07-22 PROCEDURE — 97161 PT EVAL LOW COMPLEX 20 MIN: CPT

## 2019-07-22 PROCEDURE — A9270 NON-COVERED ITEM OR SERVICE: HCPCS | Performed by: HOSPITALIST

## 2019-07-22 PROCEDURE — 82962 GLUCOSE BLOOD TEST: CPT

## 2019-07-22 PROCEDURE — 99213 OFFICE O/P EST LOW 20 MIN: CPT | Performed by: PSYCHIATRY & NEUROLOGY

## 2019-07-22 PROCEDURE — 700111 HCHG RX REV CODE 636 W/ 250 OVERRIDE (IP): Performed by: HOSPITALIST

## 2019-07-22 PROCEDURE — 97165 OT EVAL LOW COMPLEX 30 MIN: CPT

## 2019-07-22 PROCEDURE — G0378 HOSPITAL OBSERVATION PER HR: HCPCS

## 2019-07-22 RX ORDER — SODIUM CHLORIDE 9 MG/ML
INJECTION, SOLUTION INTRAVENOUS CONTINUOUS
Status: DISCONTINUED | OUTPATIENT
Start: 2019-07-22 | End: 2019-07-22 | Stop reason: HOSPADM

## 2019-07-22 RX ORDER — KETOROLAC TROMETHAMINE 30 MG/ML
30 INJECTION, SOLUTION INTRAMUSCULAR; INTRAVENOUS EVERY 6 HOURS PRN
Status: DISCONTINUED | OUTPATIENT
Start: 2019-07-22 | End: 2019-07-22

## 2019-07-22 RX ORDER — KETOROLAC TROMETHAMINE 30 MG/ML
30 INJECTION, SOLUTION INTRAMUSCULAR; INTRAVENOUS EVERY 6 HOURS PRN
Status: DISCONTINUED | OUTPATIENT
Start: 2019-07-22 | End: 2019-07-22 | Stop reason: HOSPADM

## 2019-07-22 RX ADMIN — INSULIN HUMAN 5 UNITS: 100 INJECTION, SOLUTION PARENTERAL at 17:24

## 2019-07-22 RX ADMIN — SENNOSIDES, DOCUSATE SODIUM 2 TABLET: 50; 8.6 TABLET, FILM COATED ORAL at 05:57

## 2019-07-22 RX ADMIN — INSULIN GLARGINE 20 UNITS: 100 INJECTION, SOLUTION SUBCUTANEOUS at 17:24

## 2019-07-22 RX ADMIN — SODIUM CHLORIDE: 9 INJECTION, SOLUTION INTRAVENOUS at 12:28

## 2019-07-22 RX ADMIN — INSULIN HUMAN 5 UNITS: 100 INJECTION, SOLUTION PARENTERAL at 12:26

## 2019-07-22 RX ADMIN — ACYCLOVIR SODIUM 635 MG: 500 INJECTION, SOLUTION INTRAVENOUS at 05:57

## 2019-07-22 RX ADMIN — INSULIN HUMAN 3 UNITS: 100 INJECTION, SOLUTION PARENTERAL at 07:32

## 2019-07-22 ASSESSMENT — COGNITIVE AND FUNCTIONAL STATUS - GENERAL
MOBILITY SCORE: 24
SUGGESTED CMS G CODE MODIFIER MOBILITY: CH
SUGGESTED CMS G CODE MODIFIER DAILY ACTIVITY: CH
DAILY ACTIVITIY SCORE: 24

## 2019-07-22 ASSESSMENT — ACTIVITIES OF DAILY LIVING (ADL): TOILETING: INDEPENDENT

## 2019-07-22 ASSESSMENT — GAIT ASSESSMENTS
DEVIATION: ANTALGIC;INCREASED BASE OF SUPPORT
DISTANCE (FEET): 150
GAIT LEVEL OF ASSIST: SUPERVISED

## 2019-07-22 NOTE — PROGRESS NOTES
"0800:  Assumed care of patient at 0700.  Report received at bedside.  Patient is A&O x 4, Cordero in place for retention.  Patient's FSBS this AM was 244, to which patient stated \"That's good!  It's less than the 300's I normally get at home.\"  Patient educated about safe FSBS numbers and how it is possible that consistently elevated blood sugars could be causing his symptoms.  Patient states understanding.  Hourly rounding in place.    1130:  Cordero catheter removed.  700 mL of urine in bag.  Instructed patient to call for the restroom and explained that is he does not urinate within the next 6 hours, we will bladder scan him.  Orders placed for bladder scan and straight cath PRN.  "

## 2019-07-22 NOTE — H&P
"Hospital Medicine History & Physical Note    Date of Service  7/21/2019    Primary Care Physician  Pcp Pt States None    Consultants  I discussed with Dr. Fry, neurology    Code Status  full    Chief Complaint  Unable to urinate    History of Presenting Illness  33 y.o. male who presented 7/21/2019 with inability to urinate for the past two days. Mr. Langston has a past medical history of hepatitis C and insulin-dependent diabetes mellitus that has previously been very poorly controlled with a last hemoglobin A1c of 16 that presented to the emergency room today with inability to urinate for the past 2 days.  He has had \"7 out of 10\" pain in his lower extremities and weakness.  He states last night he passed out had to crawl to the sofa and is not unable to walk.  In the emergency room, Cordero catheter was placed and 600 mL of urine was obtained.  He states that he has pain throughout his legs.   In the emergency room, he is amenable to a work-up including lumbar puncture.  He is requested IV pain medications for sedation due to his current ongoing hypersensitivity thus conscious sedation has been arranged.  In the emergency room, he denied methamphetamine abuse.  He states he is never had trouble urinating before.    Review of Systems  Review of Systems   Constitutional: Positive for malaise/fatigue. Negative for chills and fever.   Respiratory: Negative for shortness of breath.    Cardiovascular: Negative for chest pain.   Neurological: Positive for weakness.   All other systems reviewed and are negative.      Past Medical History   has a past medical history of Diabetes (HCC) and Hepatitis C.    Surgical History   has a past surgical history that includes hernia repair (Left, 4/2016).     Family History  Sister has leukema     Social History   reports that he has been smoking Cigarettes.  He has been smoking about 0.25 packs per day. He has never used smokeless tobacco. He reports that he drinks alcohol. He " reports that he does not use drugs.    Allergies  No Known Allergies    Medications  Prior to Admission Medications   Prescriptions Last Dose Informant Patient Reported? Taking?   insulin glargine (BASAGLAR KWIKPEN) 100 UNIT/ML Solution Pen-injector injection 7/20/2019 at Unknown time  Yes Yes   Sig: Inject 20-80 Units as instructed every evening.      Facility-Administered Medications: None       Physical Exam  Temp:  [36.4 °C (97.5 °F)-37.1 °C (98.8 °F)] 37.1 °C (98.7 °F)  Pulse:  [] 70  Resp:  [16-18] 18  BP: (116-138)/(75-86) 132/86  SpO2:  [96 %-99 %] 98 %    Physical Exam   Constitutional: He is oriented to person, place, and time.   He is thin   HENT:   He is missing his right to front teeth  Dry mucous membranes   Eyes: Right eye exhibits no discharge. Left eye exhibits no discharge. No scleral icterus.   Neck: Normal range of motion. Neck supple.   Cardiovascular: Normal rate and regular rhythm.    No murmur heard.  Pulmonary/Chest: Effort normal. No respiratory distress.   Abdominal: Soft. He exhibits no distension. There is no tenderness.   Musculoskeletal: He exhibits no edema or tenderness.   Neurological: He is alert and oriented to person, place, and time.   He is very weak in his lower extremities though is able to get both legs off of the bed with effort  Normal strength in his upper extremities   Skin: Skin is warm and dry. He is not diaphoretic.   Multiple tattoos  No rashes   Psychiatric: He has a normal mood and affect. His behavior is normal.   Nursing note and vitals reviewed.      Laboratory:  Recent Labs      07/21/19   0033   WBC  7.6   RBC  5.18   HEMOGLOBIN  15.2   HEMATOCRIT  45.7   MCV  88.2   MCH  29.3   MCHC  33.3*   RDW  43.3   PLATELETCT  291   MPV  10.7     Recent Labs      07/21/19   0033   SODIUM  135   POTASSIUM  3.9   CHLORIDE  100   CO2  27   GLUCOSE  265*   BUN  13   CREATININE  0.66   CALCIUM  9.1     Recent Labs      07/21/19   0033   GLUCOSE  265*         No results  for input(s): NTPROBNP in the last 72 hours.      No results for input(s): TROPONINT in the last 72 hours.    Urinalysis:    Recent Labs      07/21/19   0911   SPECGRAVITY  1.013   GLUCOSEUR  >=1000*   KETONES  Negative   NITRITE  Negative   LEUKESTERAS  Negative        Imaging:  MR-LUMBAR SPINE-WITH & W/O   Final Result      1.  There is mild acute fracture along the superior endplate of L4 vertebral body.   2.  There is abnormal T2 hyperintensity and enhancement of right paraspinal muscles at the levels of L3, L4 and L5. This can be seen in the patients with Hepes Zoster infection. Alternatively this can be seen in myositis or trauma.      MR-THORACIC SPINE-WITH & W/O   Final Result      1.  There are focal abnormal intramedullary T2 signal intensities in the thoracic spinal cord at the levels of T4 and T5. There is no abnormal contrast enhancement. These findings are concerning for demyelinating plaques . MR examination of the brain and    cervical spine is recommended for further evaluation.   2.   Please note MR examination of the lumbar spine demonstrates abnormal right paraspinal muscle enlargement and enhancement which can be seen in the patients with herpes zoster infection. Abnormal intramedullary T2 signal intensities can be seen in    herpes zoster infection.      MR-BRAIN-WITH & W/O    (Results Pending)   MR-CERVICAL SPINE-WITH & W/O    (Results Pending)         Assessment/Plan:  I anticipate this patient will require at least two midnights for appropriate medical management, necessitating inpatient admission.    * Weakness of both lower extremities- (present on admission)   Assessment & Plan    Cryptic source of bilateral lower extremity weakness with hyperesthesia  Lumbar puncture has been performed  HIV is been ordered and was negative  He will empirically be started on IV acyclovir until the results of the lumbar puncture  MRI of the spine and brain have been ordered  Neurology has been  consulted  Consider transverse myelitis or GBS  This may be due to severe diabetic neuropathy     Uncontrolled type 1 diabetes mellitus with hyperglycemia (HCC)- (present on admission)   Assessment & Plan    With hyperglycemia present upon admission  He is on varying dose of long-acting insulin at home  Sliding scale insulin has been ordered as well as 20 units of Lantus which may be escalated accordingly based on his sugars  Hemoglobin A1c is been ordered     Urinary retention- (present on admission)   Assessment & Plan    Cordero catheter placed     Methamphetamine abuse (HCC)- (present on admission)   Assessment & Plan    As is evidenced by a positive urine tox screen         VTE prophylaxis: SCDs until 7/22 then okay to start anticoag s/p LP

## 2019-07-22 NOTE — ASSESSMENT & PLAN NOTE
With hyperglycemia present upon admission  He is on varying dose of long-acting insulin at home  Sliding scale insulin has been ordered as well as 20 units of Lantus which may be escalated accordingly based on his sugars  Hemoglobin A1c is been ordered

## 2019-07-22 NOTE — CARE PLAN
Problem: Safety  Goal: Will remain free from injury  Outcome: PROGRESSING AS EXPECTED  Bed alarm on. Call light within reach.

## 2019-07-22 NOTE — PROGRESS NOTES
Pt alert and oriented times four. Reported some discomfort to his genitalia, especially when touched. He is on room air. Ochoa catheter noted, CNA performed ochoa care, tolerated some. Plan of care reviewed with him. He voiced understanding. He is cooperative with cares. Call light and personal items within reach. Will monitor closely.

## 2019-07-22 NOTE — THERAPY
"Occupational Therapy Evaluation completed.   Functional Status: Supine > EOB supv, transfers without AD supv, LB dressing supv, bed-level grooming mod I   Plan of Care: Patient with no further skilled OT needs in the acute care setting at this time  Discharge Recommendations:  Equipment: No Equipment Needed. Post-acute therapy: Anticipate that the patient will have no further occupational therapy needs after discharge from the hospital.     See \"Rehab Therapy-Acute\" Patient Summary Report for complete documentation.    33 y.o. male with h/o DMI, hep C, admitted with urinary retention, BLE weakness. Pt being worked-up for possible GBS or transverse myelitis. Pt seen for OT eval. Presents with no UE deficits, no visual changes, balance is intact. Chief complaint is penile sensitivity due to indwelling cath. Pt is completing basic ADL and transfers with no more than supv. No further acute OT needs at this time.     "

## 2019-07-22 NOTE — PROGRESS NOTES
Patient Diagnosis and Management daily plan per neurology:        1) Transient leg weakness: Patient who came with leg weakness and inability to urinate and today he says is all much resolved, and was ambulating without any problem.  No pain.        Complete neurological note to support plan is below.    Chief Complain:F/U for: f/u leg weaknes    SUBJECTIVE:    Recovered leg function today, 48hr after first event.        ROS:  Reviewed and no changes from yesterday note 7/21/2019      PHYSICAL EXAMINATION:    Examination was done and no changes were present from yesterday note [id number] except for :     Constitutional: lying in bed, comfortable    CARDIOVASCULAR:s1,s2    PULMONARY:cta      EXTREMITIES:no edema     NEUROLOGICAL:    MENTAL: awake,alert, oriented #5  Nomal language and speech     CRANIAL NERVES:2-12 normal.    MOTOR:5/5    SENSORY:intact to all     DTR:Absent in the LE +1 UE    BABINSKI:neg    Movements: No abnormal noticed.    CEREBELLAR:  No   dysmetria     GAIT: deferred.                      Vitals:    07/21/19 2000 07/22/19 0000 07/22/19 0400 07/22/19 0800   BP: 132/86 133/78 129/81 125/80   Pulse: 70 80 84 84   Resp: 18 16 18 16   Temp: 37.1 °C (98.7 °F) 37.4 °C (99.3 °F) 36.9 °C (98.5 °F) 37.7 °C (99.8 °F)   TempSrc: Temporal Temporal Temporal Temporal   SpO2: 98% 96% 96% 96%   Weight:                      Imaging: neuroimaging reviewed and directly visualized by me  MR-LUMBAR SPINE-WITH & W/O   Final Result      1.  There is mild acute fracture along the superior endplate of L4 vertebral body.   2.  There is abnormal T2 hyperintensity and enhancement of right paraspinal muscles at the levels of L3, L4 and L5. This can be seen in the patients with Hepes Zoster infection. Alternatively this can be seen in myositis or trauma.      MR-THORACIC SPINE-WITH & W/O   Final Result      1.  There are focal abnormal intramedullary T2 signal intensities in the thoracic spinal cord at the levels of T4 and T5.  There is no abnormal contrast enhancement. These findings are concerning for demyelinating plaques . MR examination of the brain and    cervical spine is recommended for further evaluation.   2.   Please note MR examination of the lumbar spine demonstrates abnormal right paraspinal muscle enlargement and enhancement which can be seen in the patients with herpes zoster infection. Abnormal intramedullary T2 signal intensities can be seen in    herpes zoster infection.      MR-BRAIN-WITH & W/O    (Results Pending)   MR-CERVICAL SPINE-WITH & W/O    (Results Pending)         Labs:  Recent Labs      07/21/19   0033   WBC  7.6   RBC  5.18   HEMOGLOBIN  15.2   HEMATOCRIT  45.7   MCV  88.2   MCH  29.3   MCHC  33.3*   RDW  43.3   PLATELETCT  291   MPV  10.7     Recent Labs      07/21/19 0033   SODIUM  135   POTASSIUM  3.9   CHLORIDE  100   CO2  27   GLUCOSE  265*   BUN  13   CREATININE  0.66   CALCIUM  9.1                     Recent Labs      07/21/19   0033   SODIUM  135   POTASSIUM  3.9   CHLORIDE  100   CO2  27   GLUCOSE  265*   BUN  13     Recent Labs      07/21/19   0033   SODIUM  135   POTASSIUM  3.9   CHLORIDE  100   CO2  27   BUN  13   CREATININE  0.66   CALCIUM  9.1         No results found for this or any previous visit.                  Ana Babin M.D.    Diplomate Neurology, Vascular Neurology and Neurophysiology

## 2019-07-22 NOTE — ASSESSMENT & PLAN NOTE
Cryptic source of bilateral lower extremity weakness with hyperesthesia  Lumbar puncture has been performed  HIV is been ordered and was negative  He will empirically be started on IV acyclovir until the results of the lumbar puncture  MRI of the spine and brain have been ordered  Neurology has been consulted  Consider transverse myelitis or GBS  This may be due to severe diabetic neuropathy

## 2019-07-22 NOTE — PROGRESS NOTES
Patient admitted to unit post LP at ~1615.  Patient resting in flat position per MD order, no complaints, skin intact, indwelling ochoa intact and draining.  Patient verbalized understanding of activity restrictions and safety measures.  Patient is sleeping in bed at the time of this note with call light in reach.

## 2019-07-22 NOTE — THERAPY
"Pt is a 34 y/o male admitted for inability to urinate and LE weakness with hx of uncontrolled DM I and Hep C. Pt presents to acute PT at PLOF baseline. Demonstrated slight gait and balance deviations which were related to urinary catheter pain. Pt with no further acute PT needs at this time.     Physical Therapy Evaluation completed.   Bed Mobility:  Supine to Sit: Supervised  Transfers: Sit to Stand: Supervised  Gait: Level Of Assist: Supervised with No Equipment Needed       Plan of Care: Patient with no further skilled PT needs in the acute care setting at this time, patient demonstrates safety with mobility in this environment at this time.   Discharge Recommendations: Equipment: No Equipment Needed. Post-acute therapy Currently anticipate no further skilled therapy needs once patient is discharged from the inpatient setting.    See \"Rehab Therapy-Acute\" Patient Summary Report for complete documentation.     "

## 2019-07-23 LAB
ALB CSF/SERPL: 11.3 RATIO (ref 0–9)
ALBUMIN CSF-MCNC: 40 MG/DL (ref 0–35)
ALBUMIN SERPL-MCNC: 3550 MG/DL (ref 3500–5200)
IGG CSF-MCNC: 7.2 MG/DL (ref 0–6)
IGG SERPL-MCNC: 954 MG/DL (ref 768–1632)
IGG SYNTH RATE SER+CSF CALC-MRATE: 8.9 MG/D
IGG/ALB CLEAR SER+CSF-RTO: 0.67 RATIO (ref 0.28–0.66)
IGG/ALB CSF: 0.18 RATIO (ref 0.09–0.25)
OLIGOCLONAL BANDS CSF ELPH-IMP: NORMAL
OLIGOCLONAL BANDS CSF IEF: 0 BANDS (ref 0–1)
OLIGOCLONAL BANDS.IT SER+CSF QL: NEGATIVE
VDRL CSF QL: NON REACTIVE

## 2019-07-23 NOTE — DISCHARGE SUMMARY
Discharge Summary    CHIEF COMPLAINT ON ADMISSION  Chief Complaint   Patient presents with   • Unable to Urinate     states last urination 2 days ago       Reason for Admission  Inability To Urinate     Admission Date  7/21/2019    CODE STATUS  Full Code    HPI & HOSPITAL COURSE  This is a 33 y.o. male here with bilateral lower extremity weakness and inability to urinate. Imaging demonstrated a mild L4 acute fracture and T2 hyperintensity and enhancement of the right paraspinal muscles consistent with herpes zoster or trauma. Neurology was consulted. By the following day the patient's symptoms had completely resolved and neurology recommended discharge with close outpatient follow-up with neurology. His acyclovir was stopped. He was very anxious to discharge home and he was advised of his recommended follow-up plan.       Therefore, he is discharged in fair and stable condition to home with close outpatient follow-up.      Discharge Date  07/22/19     FOLLOW UP ITEMS POST DISCHARGE  He needs to follow up with neurology    DISCHARGE DIAGNOSES  Principal Problem (Resolved):    Weakness of both lower extremities POA: Yes  Active Problems:    Uncontrolled type 1 diabetes mellitus with hyperglycemia (HCC) POA: Yes    Methamphetamine abuse (HCC) POA: Yes  Resolved Problems:    Urinary retention POA: Yes      FOLLOW UP  Future Appointments  Date Time Provider Department Center   8/23/2019 10:20 AM Michelle Regalado M.D. [facility]     Ana Babin M.D.  [address] [place] [address] [id number] [phone]    In 2 weeks  For post-hospital follow-up      MEDICATIONS ON DISCHARGE     Medication List      CONTINUE taking these medications      Instructions   BASAGLAR KWIKPEN 100 UNIT/ML Sopn injection  Generic drug:  insulin glargine   Inject 20-80 Units as instructed every evening.  Dose:  20-80 Units            Allergies  No Known Allergies    DIET  Orders Placed This Encounter   Procedures   • Diet  Order Diabetic     Standing Status:   Standing     Number of Occurrences:   1     Order Specific Question:   Diet:     Answer:   Diabetic [3]       ACTIVITY  As tolerated.  Weight bearing as tolerated    CONSULTATIONS  Neurology    PROCEDURES  Lumbar puncture, 7/21/2019    LABORATORY  Lab Results   Component Value Date    SODIUM 135 07/21/2019    POTASSIUM 3.9 07/21/2019    CHLORIDE 100 07/21/2019    CO2 27 07/21/2019    GLUCOSE 265 (H) 07/21/2019    BUN 13 07/21/2019    CREATININE 0.66 07/21/2019        Lab Results   Component Value Date    WBC 7.6 07/21/2019    HEMOGLOBIN 15.2 07/21/2019    HEMATOCRIT 45.7 07/21/2019    PLATELETCT 291 07/21/2019        Total time of the discharge process exceeds 40 minutes.

## 2019-07-23 NOTE — DISCHARGE INSTRUCTIONS
Diabetes Mellitus and Food  It is important for you to manage your blood sugar (glucose) level. Your blood glucose level can be greatly affected by what you eat. Eating healthier foods in the appropriate amounts throughout the day at about the same time each day will help you control your blood glucose level. It can also help slow or prevent worsening of your diabetes mellitus. Healthy eating may even help you improve the level of your blood pressure and reach or maintain a healthy weight.  General recommendations for healthful eating and cooking habits include:  · Eating meals and snacks regularly. Avoid going long periods of time without eating to lose weight.  · Eating a diet that consists mainly of plant-based foods, such as fruits, vegetables, nuts, legumes, and whole grains.  · Using low-heat cooking methods, such as baking, instead of high-heat cooking methods, such as deep frying.  Work with your dietitian to make sure you understand how to use the Nutrition Facts information on food labels.  How can food affect me?  Carbohydrates   Carbohydrates affect your blood glucose level more than any other type of food. Your dietitian will help you determine how many carbohydrates to eat at each meal and teach you how to count carbohydrates. Counting carbohydrates is important to keep your blood glucose at a healthy level, especially if you are using insulin or taking certain medicines for diabetes mellitus.  Alcohol   Alcohol can cause sudden decreases in blood glucose (hypoglycemia), especially if you use insulin or take certain medicines for diabetes mellitus. Hypoglycemia can be a life-threatening condition. Symptoms of hypoglycemia (sleepiness, dizziness, and disorientation) are similar to symptoms of having too much alcohol.  If your health care provider has given you approval to drink alcohol, do so in moderation and use the following guidelines:  · Women should not have more than one drink per day, and men  should not have more than two drinks per day. One drink is equal to:  ¨ 12 oz of beer.  ¨ 5 oz of wine.  ¨ 1½ oz of hard liquor.  · Do not drink on an empty stomach.  · Keep yourself hydrated. Have water, diet soda, or unsweetened iced tea.  · Regular soda, juice, and other mixers might contain a lot of carbohydrates and should be counted.  What foods are not recommended?  As you make food choices, it is important to remember that all foods are not the same. Some foods have fewer nutrients per serving than other foods, even though they might have the same number of calories or carbohydrates. It is difficult to get your body what it needs when you eat foods with fewer nutrients. Examples of foods that you should avoid that are high in calories and carbohydrates but low in nutrients include:  · Trans fats (most processed foods list trans fats on the Nutrition Facts label).  · Regular soda.  · Juice.  · Candy.  · Sweets, such as cake, pie, doughnuts, and cookies.  · Fried foods.  What foods can I eat?  Eat nutrient-rich foods, which will nourish your body and keep you healthy. The food you should eat also will depend on several factors, including:  · The calories you need.  · The medicines you take.  · Your weight.  · Your blood glucose level.  · Your blood pressure level.  · Your cholesterol level.  You should eat a variety of foods, including:  · Protein.  ¨ Lean cuts of meat.  ¨ Proteins low in saturated fats, such as fish, egg whites, and beans. Avoid processed meats.  · Fruits and vegetables.  ¨ Fruits and vegetables that may help control blood glucose levels, such as apples, mangoes, and yams.  · Dairy products.  ¨ Choose fat-free or low-fat dairy products, such as milk, yogurt, and cheese.  · Grains, bread, pasta, and rice.  ¨ Choose whole grain products, such as multigrain bread, whole oats, and brown rice. These foods may help control blood pressure.  · Fats.  ¨ Foods containing healthful fats, such as nuts,  avocado, olive oil, canola oil, and fish.  Does everyone with diabetes mellitus have the same meal plan?  Because every person with diabetes mellitus is different, there is not one meal plan that works for everyone. It is very important that you meet with a dietitian who will help you create a meal plan that is just right for you.  This information is not intended to replace advice given to you by your health care provider. Make sure you discuss any questions you have with your health care provider.  Document Released: 09/14/2006 Document Revised: 05/25/2017 Document Reviewed: 11/14/2014  [employer] Patient Education © 2017 [employer]      Discharge Instructions    Discharged to home by car with relative. Discharged via wheelchair, hospital escort: Refused.  Special equipment needed: Not Applicable    Be sure to schedule a follow-up appointment with your primary care doctor or any specialists as instructed.     Discharge Plan:   Diet Plan: Discussed  Activity Level: Discussed  Smoking Cessation Offered:  (patient would like smoking cessation information on discharge)  Confirmed Follow up Appointment: Patient to Call and Schedule Appointment  Confirmed Symptoms Management: Discussed  Medication Reconciliation Updated: Yes  Influenza Vaccine Indication: Not indicated: Previously immunized this influenza season and > 8 years of age    I understand that a diet low in cholesterol, fat, and sodium is recommended for good health. Unless I have been given specific instructions below for another diet, I accept this instruction as my diet prescription.   Other diet: Diabetic      Special Instructions: None    · Is patient discharged on Warfarin / Coumadin?   No     Depression / Suicide Risk    As you are discharged from this [facility] facility, it is important to learn how to keep safe from harming yourself.    Recognize the warning signs:  · Abrupt changes in personality, positive or negative- including  increase in energy   · Giving away possessions  · Change in eating patterns- significant weight changes-  positive or negative  · Change in sleeping patterns- unable to sleep or sleeping all the time   · Unwillingness or inability to communicate  · Depression  · Unusual sadness, discouragement and loneliness  · Talk of wanting to die  · Neglect of personal appearance   · Rebelliousness- reckless behavior  · Withdrawal from people/activities they love  · Confusion- inability to concentrate     If you or a loved one observes any of these behaviors or has concerns about self-harm, here's what you can do:  · Talk about it- your feelings and reasons for harming yourself  · Remove any means that you might use to hurt yourself (examples: pills, rope, extension cords, firearm)  · Get professional help from the community (Mental Health, Substance Abuse, psychological counseling)  · Do not be alone:Call your Safe Contact- someone whom you trust who will be there for you.  · Call your local CRISIS HOTLINE 412-7802 or 447-944-5191  · Call your local Children's Mobile Crisis Response Team [place] (620) 753-1406 or www.[employer]  · Call the toll free National Suicide Prevention Hotlines   · National Suicide Prevention Lifeline 811-518-URBO (7194)  · National Hope Line Network 800-SUICIDE (335-4741)

## 2019-07-24 LAB
BACTERIA CSF CULT: NORMAL
DIAGNOSTIC IMP SPEC-IMP: NOT DETECTED
EBV DNA # SPEC NAA+PROBE: <390 CPY/ML
EBV DNA SPEC NAA+PROBE-LOG#: <2.6 LOG
GRAM STN SPEC: NORMAL
HSV1+2 IGM CSF-ACNC: 0.04 IV
MBP CSF-MCNC: 2.66 NG/ML (ref 0–5.5)
SIGNIFICANT IND 70042: NORMAL
SITE SITE: NORMAL
SOURCE SOURCE: NORMAL
SPECIMEN SOURCE: NORMAL

## 2019-07-25 LAB
ACE CSF-CCNC: 1.5 U/L (ref 0–2.5)
EEEV IGG TITR CSF IF: ABNORMAL {TITER}
EEEV IGM TITR CSF IF: ABNORMAL {TITER}
LACV IGG TITR CSF IF: ABNORMAL {TITER}
LACV IGM TITR CSF IF: ABNORMAL {TITER}
SLEV IGG TITR CSF IF: ABNORMAL {TITER}
SLEV IGM TITR CSF IF: ABNORMAL {TITER}
WEEV IGG TITR CSF IF: ABNORMAL {TITER}
WEEV IGM TITR CSF IF: ABNORMAL {TITER}
WNV IGG CSF IA-ACNC: 2.08 IV
WNV IGM CSF IA-ACNC: 0.02 IV

## 2019-07-31 ENCOUNTER — HOSPITAL ENCOUNTER (EMERGENCY)
Facility: MEDICAL CENTER | Age: 34
End: 2019-07-31
Attending: EMERGENCY MEDICINE
Payer: MEDICAID

## 2019-07-31 VITALS
HEART RATE: 85 BPM | DIASTOLIC BLOOD PRESSURE: 87 MMHG | RESPIRATION RATE: 20 BRPM | HEIGHT: 61 IN | WEIGHT: 140 LBS | TEMPERATURE: 97.9 F | BODY MASS INDEX: 26.43 KG/M2 | SYSTOLIC BLOOD PRESSURE: 127 MMHG | OXYGEN SATURATION: 98 %

## 2019-07-31 DIAGNOSIS — R73.9 HYPERGLYCEMIA: ICD-10-CM

## 2019-07-31 LAB
ALBUMIN SERPL BCP-MCNC: 4.2 G/DL (ref 3.2–4.9)
ALBUMIN/GLOB SERPL: 1.4 G/DL
ALP SERPL-CCNC: 125 U/L (ref 30–99)
ALT SERPL-CCNC: 123 U/L (ref 2–50)
ANION GAP SERPL CALC-SCNC: 11 MMOL/L (ref 0–11.9)
AST SERPL-CCNC: 69 U/L (ref 12–45)
B-OH-BUTYR SERPL-MCNC: 0.71 MMOL/L (ref 0.02–0.27)
BASOPHILS # BLD AUTO: 0.5 % (ref 0–1.8)
BASOPHILS # BLD: 0.05 K/UL (ref 0–0.12)
BILIRUB SERPL-MCNC: 0.5 MG/DL (ref 0.1–1.5)
BUN SERPL-MCNC: 11 MG/DL (ref 8–22)
CALCIUM SERPL-MCNC: 9.1 MG/DL (ref 8.5–10.5)
CHLORIDE SERPL-SCNC: 102 MMOL/L (ref 96–112)
CO2 SERPL-SCNC: 23 MMOL/L (ref 20–33)
CREAT SERPL-MCNC: 0.5 MG/DL (ref 0.5–1.4)
EOSINOPHIL # BLD AUTO: 0.08 K/UL (ref 0–0.51)
EOSINOPHIL NFR BLD: 0.8 % (ref 0–6.9)
ERYTHROCYTE [DISTWIDTH] IN BLOOD BY AUTOMATED COUNT: 41.1 FL (ref 35.9–50)
GLOBULIN SER CALC-MCNC: 2.9 G/DL (ref 1.9–3.5)
GLUCOSE BLD-MCNC: 196 MG/DL (ref 65–99)
GLUCOSE BLD-MCNC: 233 MG/DL (ref 65–99)
GLUCOSE SERPL-MCNC: 267 MG/DL (ref 65–99)
HCT VFR BLD AUTO: 42.9 % (ref 42–52)
HGB BLD-MCNC: 14.3 G/DL (ref 14–18)
IMM GRANULOCYTES # BLD AUTO: 0.04 K/UL (ref 0–0.11)
IMM GRANULOCYTES NFR BLD AUTO: 0.4 % (ref 0–0.9)
LYMPHOCYTES # BLD AUTO: 2.43 K/UL (ref 1–4.8)
LYMPHOCYTES NFR BLD: 25.4 % (ref 22–41)
MCH RBC QN AUTO: 29.3 PG (ref 27–33)
MCHC RBC AUTO-ENTMCNC: 33.3 G/DL (ref 33.7–35.3)
MCV RBC AUTO: 87.9 FL (ref 81.4–97.8)
MONOCYTES # BLD AUTO: 0.56 K/UL (ref 0–0.85)
MONOCYTES NFR BLD AUTO: 5.9 % (ref 0–13.4)
NEUTROPHILS # BLD AUTO: 6.41 K/UL (ref 1.82–7.42)
NEUTROPHILS NFR BLD: 67 % (ref 44–72)
NRBC # BLD AUTO: 0 K/UL
NRBC BLD-RTO: 0 /100 WBC
PLATELET # BLD AUTO: 319 K/UL (ref 164–446)
PMV BLD AUTO: 10.2 FL (ref 9–12.9)
POTASSIUM SERPL-SCNC: 4.7 MMOL/L (ref 3.6–5.5)
PROT SERPL-MCNC: 7.1 G/DL (ref 6–8.2)
RBC # BLD AUTO: 4.88 M/UL (ref 4.7–6.1)
SODIUM SERPL-SCNC: 136 MMOL/L (ref 135–145)
WBC # BLD AUTO: 9.6 K/UL (ref 4.8–10.8)

## 2019-07-31 PROCEDURE — 99284 EMERGENCY DEPT VISIT MOD MDM: CPT

## 2019-07-31 PROCEDURE — 82010 KETONE BODYS QUAN: CPT

## 2019-07-31 PROCEDURE — 85025 COMPLETE CBC W/AUTO DIFF WBC: CPT

## 2019-07-31 PROCEDURE — 36415 COLL VENOUS BLD VENIPUNCTURE: CPT

## 2019-07-31 PROCEDURE — 700105 HCHG RX REV CODE 258: Performed by: EMERGENCY MEDICINE

## 2019-07-31 PROCEDURE — 96372 THER/PROPH/DIAG INJ SC/IM: CPT

## 2019-07-31 PROCEDURE — 700102 HCHG RX REV CODE 250 W/ 637 OVERRIDE(OP): Performed by: EMERGENCY MEDICINE

## 2019-07-31 PROCEDURE — 82962 GLUCOSE BLOOD TEST: CPT | Mod: 91

## 2019-07-31 PROCEDURE — 80053 COMPREHEN METABOLIC PANEL: CPT

## 2019-07-31 RX ORDER — SODIUM CHLORIDE 9 MG/ML
1000 INJECTION, SOLUTION INTRAVENOUS ONCE
Status: COMPLETED | OUTPATIENT
Start: 2019-07-31 | End: 2019-07-31

## 2019-07-31 RX ADMIN — SODIUM CHLORIDE 1000 ML: 9 INJECTION, SOLUTION INTRAVENOUS at 10:06

## 2019-07-31 RX ADMIN — INSULIN HUMAN 5 UNITS: 100 INJECTION, SOLUTION PARENTERAL at 11:20

## 2019-07-31 ASSESSMENT — LIFESTYLE VARIABLES: DO YOU DRINK ALCOHOL: NO

## 2019-07-31 NOTE — ED NOTES
Pt educated on plan of care. Call light in reach, side rails up x2, bed in lowest position, pt resting, no acute distress noted at this time.

## 2019-07-31 NOTE — ED NOTES
Patient was educated on discharge instructions.  Patient was informed about diagnosis, symptom management, risks, and home care instructions.  Patient verbalized understanding and signed discharge instructions.  Copy of discharge instructions in chart.  Patient ambulated out with steady gait and with [employer].  Patient has personal belongings and PIV removed, tip intact.

## 2019-07-31 NOTE — ED NOTES
Pt report received from talia be. Pt ambulatory to restroom with steady gait and standby rn assist to obtain urine sample  IV started per orders. Pt tolerated procedure well, resting comfortably at this time. Labs sent. Pt educated about plan of care.  Fluids started

## 2019-07-31 NOTE — ED TRIAGE NOTES
34 y/o male bib [employer] and [facility] after pt was refused from halfway at [facility] for elevated blood sugar and +ketones in the urine. Pt is an insulin dependant diabetic, he states he has not been vomiting or ill recently, did not take his insulin today.

## 2019-09-15 LAB
MYCOBACTERIUM SPEC CULT: NORMAL
RHODAMINE-AURAMINE STN SPEC: NORMAL
SIGNIFICANT IND 70042: NORMAL
SITE SITE: NORMAL
SOURCE SOURCE: NORMAL

## 2019-09-23 ENCOUNTER — HOSPITAL ENCOUNTER (EMERGENCY)
Facility: MEDICAL CENTER | Age: 34
End: 2019-09-23
Attending: EMERGENCY MEDICINE
Payer: MEDICAID

## 2019-09-23 ENCOUNTER — PATIENT OUTREACH (OUTPATIENT)
Dept: HEALTH INFORMATION MANAGEMENT | Facility: OTHER | Age: 34
End: 2019-09-23

## 2019-09-23 VITALS
HEART RATE: 82 BPM | DIASTOLIC BLOOD PRESSURE: 83 MMHG | SYSTOLIC BLOOD PRESSURE: 121 MMHG | HEIGHT: 64 IN | RESPIRATION RATE: 17 BRPM | WEIGHT: 135.8 LBS | TEMPERATURE: 97.5 F | BODY MASS INDEX: 23.18 KG/M2 | OXYGEN SATURATION: 97 %

## 2019-09-23 DIAGNOSIS — Z76.0 MEDICATION REFILL: ICD-10-CM

## 2019-09-23 DIAGNOSIS — R73.9 HYPERGLYCEMIA: ICD-10-CM

## 2019-09-23 LAB
ALBUMIN SERPL BCP-MCNC: 4.6 G/DL (ref 3.2–4.9)
ALBUMIN/GLOB SERPL: 1.8 G/DL
ALP SERPL-CCNC: 113 U/L (ref 30–99)
ALT SERPL-CCNC: 178 U/L (ref 2–50)
ANION GAP SERPL CALC-SCNC: 15 MMOL/L (ref 0–11.9)
APPEARANCE UR: CLEAR
AST SERPL-CCNC: 58 U/L (ref 12–45)
BASOPHILS # BLD AUTO: 0.4 % (ref 0–1.8)
BASOPHILS # BLD: 0.03 K/UL (ref 0–0.12)
BILIRUB SERPL-MCNC: 1.8 MG/DL (ref 0.1–1.5)
BUN SERPL-MCNC: 14 MG/DL (ref 8–22)
CALCIUM SERPL-MCNC: 9.2 MG/DL (ref 8.5–10.5)
CHLORIDE SERPL-SCNC: 93 MMOL/L (ref 96–112)
CO2 SERPL-SCNC: 23 MMOL/L (ref 20–33)
COLOR UR AUTO: YELLOW
CREAT SERPL-MCNC: 0.67 MG/DL (ref 0.5–1.4)
EOSINOPHIL # BLD AUTO: 0.11 K/UL (ref 0–0.51)
EOSINOPHIL NFR BLD: 1.4 % (ref 0–6.9)
ERYTHROCYTE [DISTWIDTH] IN BLOOD BY AUTOMATED COUNT: 38.1 FL (ref 35.9–50)
GLOBULIN SER CALC-MCNC: 2.6 G/DL (ref 1.9–3.5)
GLUCOSE BLD-MCNC: 291 MG/DL (ref 65–99)
GLUCOSE BLD-MCNC: 310 MG/DL (ref 65–99)
GLUCOSE SERPL-MCNC: 317 MG/DL (ref 65–99)
GLUCOSE UR QL STRIP.AUTO: 500 MG/DL
HCT VFR BLD AUTO: 43.7 % (ref 42–52)
HGB BLD-MCNC: 14.8 G/DL (ref 14–18)
IMM GRANULOCYTES # BLD AUTO: 0.01 K/UL (ref 0–0.11)
IMM GRANULOCYTES NFR BLD AUTO: 0.1 % (ref 0–0.9)
KETONES UR QL STRIP.AUTO: >=160 MG/DL
LEUKOCYTE ESTERASE UR QL STRIP.AUTO: NEGATIVE
LYMPHOCYTES # BLD AUTO: 1.88 K/UL (ref 1–4.8)
LYMPHOCYTES NFR BLD: 24.4 % (ref 22–41)
MCH RBC QN AUTO: 29 PG (ref 27–33)
MCHC RBC AUTO-ENTMCNC: 33.9 G/DL (ref 33.7–35.3)
MCV RBC AUTO: 85.7 FL (ref 81.4–97.8)
MONOCYTES # BLD AUTO: 0.57 K/UL (ref 0–0.85)
MONOCYTES NFR BLD AUTO: 7.4 % (ref 0–13.4)
NEUTROPHILS # BLD AUTO: 5.1 K/UL (ref 1.82–7.42)
NEUTROPHILS NFR BLD: 66.3 % (ref 44–72)
NITRITE UR QL STRIP.AUTO: NEGATIVE
NRBC # BLD AUTO: 0 K/UL
NRBC BLD-RTO: 0 /100 WBC
PH UR STRIP.AUTO: 5 [PH] (ref 5–8)
PLATELET # BLD AUTO: 247 K/UL (ref 164–446)
PMV BLD AUTO: 10.3 FL (ref 9–12.9)
POTASSIUM SERPL-SCNC: 4.5 MMOL/L (ref 3.6–5.5)
PROT SERPL-MCNC: 7.2 G/DL (ref 6–8.2)
PROT UR QL STRIP: NEGATIVE MG/DL
RBC # BLD AUTO: 5.1 M/UL (ref 4.7–6.1)
RBC UR QL AUTO: NEGATIVE
SODIUM SERPL-SCNC: 131 MMOL/L (ref 135–145)
SP GR UR: 1.01 (ref 1–1.03)
WBC # BLD AUTO: 7.7 K/UL (ref 4.8–10.8)

## 2019-09-23 PROCEDURE — 82962 GLUCOSE BLOOD TEST: CPT

## 2019-09-23 PROCEDURE — 85025 COMPLETE CBC W/AUTO DIFF WBC: CPT

## 2019-09-23 PROCEDURE — 81002 URINALYSIS NONAUTO W/O SCOPE: CPT

## 2019-09-23 PROCEDURE — 81002 URINALYSIS NONAUTO W/O SCOPE: CPT | Performed by: EMERGENCY MEDICINE

## 2019-09-23 PROCEDURE — 36415 COLL VENOUS BLD VENIPUNCTURE: CPT

## 2019-09-23 PROCEDURE — 99284 EMERGENCY DEPT VISIT MOD MDM: CPT

## 2019-09-23 PROCEDURE — 80053 COMPREHEN METABOLIC PANEL: CPT

## 2019-09-23 PROCEDURE — 700105 HCHG RX REV CODE 258: Performed by: EMERGENCY MEDICINE

## 2019-09-23 RX ORDER — SODIUM CHLORIDE 9 MG/ML
1000 INJECTION, SOLUTION INTRAVENOUS ONCE
Status: COMPLETED | OUTPATIENT
Start: 2019-09-23 | End: 2019-09-23

## 2019-09-23 RX ADMIN — SODIUM CHLORIDE 1000 ML: 9 INJECTION, SOLUTION INTRAVENOUS at 12:28

## 2019-09-23 ASSESSMENT — LIFESTYLE VARIABLES: DO YOU DRINK ALCOHOL: NO

## 2019-09-23 NOTE — ED NOTES
Pt ambulates to triage with c/c high blood sugar (310 in triage) & requesting prescription for insulin.  A&ox4.  Pt to lobby & advised to inform RN of any changes

## 2019-09-23 NOTE — ED NOTES
"Agree with triage assessment, no changes noted. Pt reports \"I just got out of longterm and I don't have any Diabetes supplies.\" Pt reports pain of 3 out of 10. Pt hooked to monitor. Pt denies any further needs at this time. Call light within reach. Bed in low locked position. Comfort measures provided.     "

## 2019-09-23 NOTE — ED NOTES
Pt discharged home. Explained discharge and medication instructions. Questions and comments addressed. Pt verbalized understanding of instructions. Pt advised to follow-up with [facility] or return to ED for any new or worsening of symptoms. Pt is ambulating well and steady on feet. VS stable. Pt's friend at bedside and will be driving pt home.

## 2019-09-23 NOTE — ED PROVIDER NOTES
"ED Provider Note    CHIEF COMPLAINT  Chief Complaint   Patient presents with   • Medication Refill     needs insluin refilled    • High Blood Sugar     310 in triage       HPI  Gerber Langston is a 34 y.o. male type I diabetic who presents complaining of elevated blood sugar and medication refill.  Patient states he ran out of his insulin several days ago.  Patient denies shortness of breath, nausea, vomiting, diarrhea, fever, chills, abdominal pain.      ALLERGIES  No Known Allergies    CURRENT MEDICATIONS  Lantus      PAST MEDICAL HISTORY   has a past medical history of Diabetes (HCC) and Hepatitis C.    SURGICAL HISTORY   has a past surgical history that includes hernia repair (Left, 4/2016).    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  All other systems are negative except as above in HPI.      PHYSICAL EXAM  VITAL SIGNS: /83   Pulse 82   Temp 36.4 °C (97.5 °F) (Temporal)   Resp 17   Ht 1.626 m (5' 4\")   Wt 61.6 kg (135 lb 12.9 oz)   SpO2 97%   BMI 23.31 kg/m²     General:  WDWN, nontoxic appearing in NAD; A+Ox3; V/S as above   Skin: warm and dry; good color; no rash  HEENT: NCAT; EOMs intact; PERRL; no scleral icterus; moist mucous membranes  Neck: FROM; soft  Cardiovascular: Regular heart rate and rhythm.  No murmurs, rubs, or gallops; pulses 2+ bilaterally radially  Lungs: Clear to auscultation with good air movement bilaterally.  No wheezes, rhonchi, or rales.   Abdomen: BS present; soft; NTND; no rebound, guarding, or rigidity.  No organomegaly or pulsatile mass  Extremities: NEWELL x 4; no e/o trauma  Neurologic: CNs " III-XII grossly intact; speech clear; distal sensation intact; strength 5/5 UE/LEs  Psychiatric: Appropriate affect, normal mood    LABS  Results for orders placed or performed during the hospital encounter of 09/23/19   CBC WITH DIFFERENTIAL   Result Value Ref Range    WBC 7.7 4.8 - 10.8 K/uL    RBC 5.10 4.70 - 6.10 M/uL    Hemoglobin 14.8 14.0 - 18.0 g/dL    Hematocrit 43.7 42.0 - 52.0 %    MCV 85.7 81.4 - 97.8 fL    MCH 29.0 27.0 - 33.0 pg    MCHC 33.9 33.7 - 35.3 g/dL    RDW 38.1 35.9 - 50.0 fL    Platelet Count 247 164 - 446 K/uL    MPV 10.3 9.0 - 12.9 fL    Neutrophils-Polys 66.30 44.00 - 72.00 %    Lymphocytes 24.40 22.00 - 41.00 %    Monocytes 7.40 0.00 - 13.40 %    Eosinophils 1.40 0.00 - 6.90 %    Basophils 0.40 0.00 - 1.80 %    Immature Granulocytes 0.10 0.00 - 0.90 %    Nucleated RBC 0.00 /100 WBC    Neutrophils (Absolute) 5.10 1.82 - 7.42 K/uL    Lymphs (Absolute) 1.88 1.00 - 4.80 K/uL    Monos (Absolute) 0.57 0.00 - 0.85 K/uL    Eos (Absolute) 0.11 0.00 - 0.51 K/uL    Baso (Absolute) 0.03 0.00 - 0.12 K/uL    Immature Granulocytes (abs) 0.01 0.00 - 0.11 K/uL    NRBC (Absolute) 0.00 K/uL   COMP METABOLIC PANEL   Result Value Ref Range    Sodium 131 (L) 135 - 145 mmol/L    Potassium 4.5 3.6 - 5.5 mmol/L    Chloride 93 (L) 96 - 112 mmol/L    Co2 23 20 - 33 mmol/L    Anion Gap 15.0 (H) 0.0 - 11.9    Glucose 317 (H) 65 - 99 mg/dL    Bun 14 8 - 22 mg/dL    Creatinine 0.67 0.50 - 1.40 mg/dL    Calcium 9.2 8.5 - 10.5 mg/dL    AST(SGOT) 58 (H) 12 - 45 U/L    ALT(SGPT) 178 (H) 2 - 50 U/L    Alkaline Phosphatase 113 (H) 30 - 99 U/L    Total Bilirubin 1.8 (H) 0.1 - 1.5 mg/dL    Albumin 4.6 3.2 - 4.9 g/dL    Total Protein 7.2 6.0 - 8.2 g/dL    Globulin 2.6 1.9 - 3.5 g/dL    A-G Ratio 1.8 g/dL   ESTIMATED GFR   Result Value Ref Range    GFR If African American >60 >60 mL/min/1.73 m 2    GFR If Non African American >60 >60 mL/min/1.73 m 2   ACCU-CHEK GLUCOSE   Result Value Ref Range    Glucose - Accu-Ck 310 (H) 65 -  99 mg/dL   POC UA   Result Value Ref Range    POC Color Yellow     POC Appearance Clear     POC Glucose 500 (A) Negative mg/dL    POC Ketones >=160 (A) Negative mg/dL    POC Specific Gravity 1.015 1.005 - 1.030    POC Blood Negative Negative    POC Urine PH 5.0 5.0 - 8.0    POC Protein Negative Negative mg/dL    POC Nitrites Negative Negative    POC Leukocyte Esterase Negative Negative   ACCU-CHEK GLUCOSE   Result Value Ref Range    Glucose - Accu-Ck 291 (H) 65 - 99 mg/dL       MEDICAL RECORD  I have reviewed patient's medical record and pertinent results are listed below.      COURSE & MEDICAL DECISION MAKING  I have reviewed any medical record information, laboratory studies and radiographic results as noted.    Gerber Langston is a 34 y.o. male who presents complaining of elevated blood sugar readings due to running out of insulin.  Patient is nontoxic-appearing.  Patient is not tachycardic, febrile, or hypotensive.  He does not appear clinically dehydrated.  Labs were ordered which revealed ketonuria, anion gap of 15 and elevated serum glucose of 317.  CO2 was normal/not acidotic.      NS bolus was ordered for management of hyperglycemia.      Pt was re-evaluated  Repeat Accu-Chek was 291.  I feel he can be discharged without repeating his BMP.  Patient was given a prescription for insulin pen.  He will return for any symptoms or other concerns.  He was advised to follow-up with his primary care provider.    FINAL IMPRESSION  1. Hyperglycemia     2. Medication refill              Electronically signed by: Autumn Cohen, 9/23/2019 1:22 PM

## 2019-12-28 ENCOUNTER — HOSPITAL ENCOUNTER (EMERGENCY)
Facility: MEDICAL CENTER | Age: 34
End: 2019-12-28
Attending: EMERGENCY MEDICINE

## 2019-12-28 VITALS
BODY MASS INDEX: 26.43 KG/M2 | OXYGEN SATURATION: 99 % | HEART RATE: 107 BPM | WEIGHT: 140 LBS | TEMPERATURE: 97 F | SYSTOLIC BLOOD PRESSURE: 137 MMHG | HEIGHT: 61 IN | RESPIRATION RATE: 20 BRPM | DIASTOLIC BLOOD PRESSURE: 87 MMHG

## 2019-12-28 DIAGNOSIS — R73.9 HYPERGLYCEMIA: ICD-10-CM

## 2019-12-28 DIAGNOSIS — Z91.148 NON COMPLIANCE W MEDICATION REGIMEN: ICD-10-CM

## 2019-12-28 LAB
ALBUMIN SERPL BCP-MCNC: 4.7 G/DL (ref 3.2–4.9)
ALBUMIN/GLOB SERPL: 1.4 G/DL
ALP SERPL-CCNC: 150 U/L (ref 30–99)
ALT SERPL-CCNC: 288 U/L (ref 2–50)
ANION GAP SERPL CALC-SCNC: 16 MMOL/L (ref 0–11.9)
AST SERPL-CCNC: 152 U/L (ref 12–45)
B-OH-BUTYR SERPL-MCNC: 1.71 MMOL/L (ref 0.02–0.27)
BASOPHILS # BLD AUTO: 0.6 % (ref 0–1.8)
BASOPHILS # BLD: 0.03 K/UL (ref 0–0.12)
BILIRUB SERPL-MCNC: 0.6 MG/DL (ref 0.1–1.5)
BUN SERPL-MCNC: 9 MG/DL (ref 8–22)
CALCIUM SERPL-MCNC: 9.4 MG/DL (ref 8.5–10.5)
CHLORIDE SERPL-SCNC: 99 MMOL/L (ref 96–112)
CO2 SERPL-SCNC: 23 MMOL/L (ref 20–33)
CREAT SERPL-MCNC: 0.67 MG/DL (ref 0.5–1.4)
EKG IMPRESSION: NORMAL
EOSINOPHIL # BLD AUTO: 0.05 K/UL (ref 0–0.51)
EOSINOPHIL NFR BLD: 0.9 % (ref 0–6.9)
ERYTHROCYTE [DISTWIDTH] IN BLOOD BY AUTOMATED COUNT: 41.9 FL (ref 35.9–50)
GLOBULIN SER CALC-MCNC: 3.3 G/DL (ref 1.9–3.5)
GLUCOSE BLD-MCNC: 239 MG/DL (ref 65–99)
GLUCOSE SERPL-MCNC: 301 MG/DL (ref 65–99)
HCT VFR BLD AUTO: 46 % (ref 42–52)
HGB BLD-MCNC: 15.5 G/DL (ref 14–18)
IMM GRANULOCYTES # BLD AUTO: 0.02 K/UL (ref 0–0.11)
IMM GRANULOCYTES NFR BLD AUTO: 0.4 % (ref 0–0.9)
LIPASE SERPL-CCNC: 12 U/L (ref 11–82)
LYMPHOCYTES # BLD AUTO: 1.47 K/UL (ref 1–4.8)
LYMPHOCYTES NFR BLD: 27.2 % (ref 22–41)
MAGNESIUM SERPL-MCNC: 1.8 MG/DL (ref 1.5–2.5)
MCH RBC QN AUTO: 30.9 PG (ref 27–33)
MCHC RBC AUTO-ENTMCNC: 33.7 G/DL (ref 33.7–35.3)
MCV RBC AUTO: 91.6 FL (ref 81.4–97.8)
MONOCYTES # BLD AUTO: 0.31 K/UL (ref 0–0.85)
MONOCYTES NFR BLD AUTO: 5.7 % (ref 0–13.4)
NEUTROPHILS # BLD AUTO: 3.52 K/UL (ref 1.82–7.42)
NEUTROPHILS NFR BLD: 65.2 % (ref 44–72)
NRBC # BLD AUTO: 0 K/UL
NRBC BLD-RTO: 0 /100 WBC
OSMOLALITY SERPL: 315 MOSM/KG H2O (ref 278–298)
PHOSPHATE SERPL-MCNC: 2.9 MG/DL (ref 2.5–4.5)
PLATELET # BLD AUTO: 250 K/UL (ref 164–446)
PMV BLD AUTO: 10.7 FL (ref 9–12.9)
POTASSIUM SERPL-SCNC: 3.8 MMOL/L (ref 3.6–5.5)
PROT SERPL-MCNC: 8 G/DL (ref 6–8.2)
RBC # BLD AUTO: 5.02 M/UL (ref 4.7–6.1)
SODIUM SERPL-SCNC: 138 MMOL/L (ref 135–145)
WBC # BLD AUTO: 5.4 K/UL (ref 4.8–10.8)

## 2019-12-28 PROCEDURE — 83930 ASSAY OF BLOOD OSMOLALITY: CPT

## 2019-12-28 PROCEDURE — 82962 GLUCOSE BLOOD TEST: CPT

## 2019-12-28 PROCEDURE — 84100 ASSAY OF PHOSPHORUS: CPT

## 2019-12-28 PROCEDURE — 36415 COLL VENOUS BLD VENIPUNCTURE: CPT

## 2019-12-28 PROCEDURE — 85025 COMPLETE CBC W/AUTO DIFF WBC: CPT

## 2019-12-28 PROCEDURE — 80053 COMPREHEN METABOLIC PANEL: CPT

## 2019-12-28 PROCEDURE — 83735 ASSAY OF MAGNESIUM: CPT

## 2019-12-28 PROCEDURE — 99284 EMERGENCY DEPT VISIT MOD MDM: CPT

## 2019-12-28 PROCEDURE — 93005 ELECTROCARDIOGRAM TRACING: CPT | Performed by: EMERGENCY MEDICINE

## 2019-12-28 PROCEDURE — 83690 ASSAY OF LIPASE: CPT

## 2019-12-28 PROCEDURE — 82010 KETONE BODYS QUAN: CPT

## 2019-12-28 PROCEDURE — 83036 HEMOGLOBIN GLYCOSYLATED A1C: CPT

## 2019-12-28 PROCEDURE — 81002 URINALYSIS NONAUTO W/O SCOPE: CPT | Performed by: EMERGENCY MEDICINE

## 2019-12-28 PROCEDURE — 700105 HCHG RX REV CODE 258: Performed by: EMERGENCY MEDICINE

## 2019-12-28 RX ORDER — SODIUM CHLORIDE 9 MG/ML
1000 INJECTION, SOLUTION INTRAVENOUS ONCE
Status: COMPLETED | OUTPATIENT
Start: 2019-12-28 | End: 2019-12-28

## 2019-12-28 RX ADMIN — SODIUM CHLORIDE 1000 ML: 9 INJECTION, SOLUTION INTRAVENOUS at 22:05

## 2019-12-28 RX ADMIN — SODIUM CHLORIDE 1000 ML: 9 INJECTION, SOLUTION INTRAVENOUS at 21:59

## 2019-12-29 LAB
EST. AVERAGE GLUCOSE BLD GHB EST-MCNC: 289 MG/DL
HBA1C MFR BLD: 11.7 % (ref 0–5.6)

## 2019-12-29 NOTE — ED NOTES
Pt eloped, witness leaving by ED staff. He had an IV which was not located in room after pt left.   Hospital security and [employer] [employer] notified.

## 2019-12-29 NOTE — ED TRIAGE NOTES
Chief Complaint   Patient presents with   • Hyperglycemia     FSBG 445mg/ dl PTA by EMS. Noncompliant with insulin x 2 months per pt. + RLQ pain x 1 month. Intermittent n/v x 2 months per pt.      Pt ambulatory to room 15 with EMS and [employer] for above complaints. Pt refused at [facility] d/t elevated BG.

## 2019-12-29 NOTE — ED NOTES
Called to room by pt. Pt reports he feels like his BG is low. FSBG 239 mg/dl. ERP informed. NS stopped per ERP orders.

## 2019-12-29 NOTE — ED PROVIDER NOTES
ED Provider Note    Scribed for Sidney Pettit M.D. by Annalise Hopkins. 12/28/2019, 9:55 PM.    Primary care provider: Pcp Pt States None  Means of arrival: Police  History obtained from: Patient  History limited by: None    CHIEF COMPLAINT  Chief Complaint   Patient presents with   • Hyperglycemia     FSBG 445mg/ dl PTA by EMS. Noncompliant with insulin x 2 months per pt. + RLQ pain x 1 month. Intermittent n/v x 2 months per pt.        HPI  Gerber Langston is a 34 y.o. male, with a history of diabetes, who presents to the Emergency Department via police for evaluation of hyperglycemia that began several months ago. Patient reports that he has not been taking his insulin for 2 months. His blood sugar was measured to be 445 mg/dl upon arrival per EMS. No alleviating or exacerbating factors were identified. He reports associated nausea, vomiting for 2 months, and right lower quadrant pain for one month. He denies any associated fever, chills, or cough.     Patient also reports that he sustained a ground level fall that occurred several days ago while carrying his laptop down a flight of stairs. He reports hitting his head. However, he denies syncope, headache, or pain. No alleviating or exacerbating factors were identified.    REVIEW OF SYSTEMS  See HPI for further details. All other systems are negative.     PAST MEDICAL HISTORY   has a past medical history of Diabetes (HCC) and Hepatitis C.    SURGICAL HISTORY   has a past surgical history that includes hernia repair (Left, 4/2016).    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 No pertinent family history.    CURRENT MEDICATIONS  Reviewed.  See Encounter Summary.     ALLERGIES  No Known Allergies    PHYSICAL EXAM  VITAL SIGNS: /91   Pulse (!) 102   Temp 36.1 °C (97 °F) (Temporal)   Resp 20   Ht 1.549 m (5' 1\")   Wt 63.5 kg (140 lb)   BMI 26.45 kg/m²   Constitutional: Alert in no apparent distress.  HENT: Dry mucous membranes. No signs of trauma, Bilateral external ears normal, Nose normal.   Eyes: Pupils are equal and reactive, Conjunctiva normal, Non-icteric.   Neck: Normal range of motion, No tenderness, Supple, No stridor.   Cardiovascular: Slight tachycardia. Regular rhythm, no murmurs.   Thorax & Lungs: Normal breath sounds, No respiratory distress, No wheezing, No chest tenderness.   Abdomen: Bowel sounds normal, Soft, No tenderness, No masses, No pulsatile masses. No peritoneal signs.  Skin: Warm, Dry, No erythema, No rash.   Back: No bony tenderness, No CVA tenderness.   Extremities: Intact distal pulses, No edema, No tenderness, No cyanosis  Musculoskeletal: Good range of motion in all major joints. No tenderness to palpation or major deformities noted.   Neurologic: Alert , Normal motor function, Normal sensory function, No focal deficits noted.   Psychiatric: Affect normal, Judgment normal, Mood normal.     DIAGNOSTIC STUDIES / PROCEDURES     LABS  Results for orders placed or performed during the hospital encounter of 12/28/19   CBC WITH DIFFERENTIAL   Result Value Ref Range    WBC 5.4 4.8 - 10.8 K/uL    RBC 5.02 4.70 - 6.10 M/uL    Hemoglobin 15.5 14.0 - 18.0 g/dL    Hematocrit 46.0 42.0 - 52.0 %    MCV 91.6 81.4 - 97.8 fL    MCH 30.9 27.0 - 33.0 pg    MCHC 33.7 33.7 - 35.3 g/dL    RDW 41.9 35.9 - 50.0 fL    Platelet Count 250 164 - 446 K/uL    MPV 10.7 9.0 - 12.9 fL    Neutrophils-Polys 65.20 44.00 - 72.00 %    Lymphocytes 27.20 22.00 - 41.00 %    Monocytes 5.70 0.00 - 13.40 %    Eosinophils 0.90 0.00 - 6.90 %    Basophils 0.60 0.00 - " 1.80 %    Immature Granulocytes 0.40 0.00 - 0.90 %    Nucleated RBC 0.00 /100 WBC    Neutrophils (Absolute) 3.52 1.82 - 7.42 K/uL    Lymphs (Absolute) 1.47 1.00 - 4.80 K/uL    Monos (Absolute) 0.31 0.00 - 0.85 K/uL    Eos (Absolute) 0.05 0.00 - 0.51 K/uL    Baso (Absolute) 0.03 0.00 - 0.12 K/uL    Immature Granulocytes (abs) 0.02 0.00 - 0.11 K/uL    NRBC (Absolute) 0.00 K/uL   COMP METABOLIC PANEL   Result Value Ref Range    Sodium 138 135 - 145 mmol/L    Potassium 3.8 3.6 - 5.5 mmol/L    Chloride 99 96 - 112 mmol/L    Co2 23 20 - 33 mmol/L    Anion Gap 16.0 (H) 0.0 - 11.9    Glucose 301 (H) 65 - 99 mg/dL    Bun 9 8 - 22 mg/dL    Creatinine 0.67 0.50 - 1.40 mg/dL    Calcium 9.4 8.5 - 10.5 mg/dL    AST(SGOT) 152 (H) 12 - 45 U/L    ALT(SGPT) 288 (H) 2 - 50 U/L    Alkaline Phosphatase 150 (H) 30 - 99 U/L    Total Bilirubin 0.6 0.1 - 1.5 mg/dL    Albumin 4.7 3.2 - 4.9 g/dL    Total Protein 8.0 6.0 - 8.2 g/dL    Globulin 3.3 1.9 - 3.5 g/dL    A-G Ratio 1.4 g/dL   MAGNESIUM   Result Value Ref Range    Magnesium 1.8 1.5 - 2.5 mg/dL   PHOSPHORUS   Result Value Ref Range    Phosphorus 2.9 2.5 - 4.5 mg/dL   BETA-HYDROXYBUTYRIC ACID   Result Value Ref Range    beta-Hydroxybutyric Acid 1.71 (H) 0.02 - 0.27 mmol/L   LIPASE   Result Value Ref Range    Lipase 12 11 - 82 U/L   OSMOLALITY SERUM   Result Value Ref Range    Osmolality Serum 315 (H) 278 - 298 mOsm/kg H2O   ESTIMATED GFR   Result Value Ref Range    GFR If African American >60 >60 mL/min/1.73 m 2    GFR If Non African American >60 >60 mL/min/1.73 m 2   ACCU-CHEK GLUCOSE   Result Value Ref Range    Glucose - Accu-Ck 239 (H) 65 - 99 mg/dL   EKG (NOW)   Result Value Ref Range    Report       [facility] Emergency Dept.    Test Date:  2019  Pt Name:    BENIGNO ARRINGTON                  Department: ER  MRN:        [id number]                      Room:        15  Gender:     Male                         Technician: [id number]  :        1985                    Requested By:MYNOR DYE  Order #:    [id number]                    Reading MD:    Measurements  Intervals                                Axis  Rate:       98                           P:          73  KS:         160                          QRS:        64  QRSD:       68                           T:          62  QT:         360  QTc:        460    Interpretive Statements  SINUS RHYTHM  PROBABLE LEFT ATRIAL ABNORMALITY  LOW VOLTAGE IN FRONTAL LEADS  RSR' IN V1 OR V2, PROBABLY NORMAL VARIANT  BORDERLINE T ABNORMALITIES, ANT-LAT LEADS  No previous ECG available for comparison           All labs were reviewed by me.    EKG  12 Lead EKG interpreted by me to show:  Sinus rhythm  Rate 98  Axis: Normal  Intervals: Normal  No acute ST-T wave changes  Interpretation: Normal EKG      COURSE & MEDICAL DECISION MAKING  Pertinent Labs & Imaging studies reviewed. (See chart for details)    9:55 PM - Patient seen and examined at bedside. Discussed plan of care with patient. I informed them that labs will be ordered to evaluate symptoms. Patient is understanding and agreeable with plan.  Patient will be treated with NS infusion 1000 mL x2. Ordered POC urinalysis, CMP, CBC, Osmolality serum, lipase, hemoglobin A1c, Beta-hydroxybutyric acid, phosphorus, magnesum to evaluate his symptoms.     11:01 PM - Ordered Accu-chek glucose to evaluate.    11:27 PM - Patient eloped. [employer] was notified.     Decision Making:  This is a 34 y.o. year old male who presents with hyperglycemia.  Patient initially brought to [facility] for booking secondary to outstanding warrants however he was hypoglycemic on their medical screening and therefore brought to the emergency department.  There is no evidence of DKA.  Patient is feeling better after IV fluids and had improved glucose on repeat checks.  This point he will be discharged from ER however prior to formal discharge and my conversation with the patient the patient had eloped.  I suspect this is  secondary to the fact that the patient was given continue with ongoing booking.  [employer] has been contacted stated that the patient has eloped.        DISPOSITION:  Patient eloped.     FOLLOW UP:  [facility]  [address] [address] [phone]        [facility], Emergency Dept  [address] [address] [phone]  Go to   As needed, If symptoms worsen      FINAL IMPRESSION  1. Hyperglycemia    2. Non compliance w medication regimen          Annalise ROWE (Scribe), am scribing for, and in the presence of, Sidney Pettit M.D..    Electronically signed by: Annalise Hopkins (Scribe), 12/28/2019    Sidney ROWE M.D. personally performed the services described in this documentation, as scribed by Annalise Hopkins in my presence, and it is both accurate and complete.    C    The note accurately reflects work and decisions made by me.  Sidney Pettit  12/29/2019  4:05 AM

## 2019-12-29 NOTE — ED NOTES
Pt ambulatory to BR with steady gait. Urine sample obtained, sent to lab. IV NS continues to infuse.

## 2020-02-17 NOTE — ED NOTES
Called for PT to take back to room, no reply. Will call again in 15 minutes.   Due for anxiety follow up   30 days sent

## 2020-05-25 ENCOUNTER — APPOINTMENT (OUTPATIENT)
Dept: RADIOLOGY | Facility: MEDICAL CENTER | Age: 35
End: 2020-05-25
Attending: EMERGENCY MEDICINE

## 2020-05-25 ENCOUNTER — HOSPITAL ENCOUNTER (EMERGENCY)
Facility: MEDICAL CENTER | Age: 35
End: 2020-05-25
Attending: EMERGENCY MEDICINE

## 2020-05-25 VITALS
TEMPERATURE: 98.5 F | BODY MASS INDEX: 27.35 KG/M2 | HEART RATE: 76 BPM | OXYGEN SATURATION: 99 % | DIASTOLIC BLOOD PRESSURE: 80 MMHG | HEIGHT: 61 IN | RESPIRATION RATE: 18 BRPM | SYSTOLIC BLOOD PRESSURE: 127 MMHG | WEIGHT: 144.84 LBS

## 2020-05-25 DIAGNOSIS — Z76.0 MEDICATION REFILL: ICD-10-CM

## 2020-05-25 DIAGNOSIS — R73.9 HYPERGLYCEMIA: ICD-10-CM

## 2020-05-25 DIAGNOSIS — R74.01 TRANSAMINITIS: ICD-10-CM

## 2020-05-25 LAB
ALBUMIN SERPL BCP-MCNC: 3.9 G/DL (ref 3.2–4.9)
ALBUMIN/GLOB SERPL: 1.2 G/DL
ALP SERPL-CCNC: 121 U/L (ref 30–99)
ALT SERPL-CCNC: 532 U/L (ref 2–50)
ANION GAP SERPL CALC-SCNC: 12 MMOL/L (ref 7–16)
APAP SERPL-MCNC: <5 UG/ML (ref 10–30)
APPEARANCE UR: CLEAR
AST SERPL-CCNC: 317 U/L (ref 12–45)
B-OH-BUTYR SERPL-MCNC: 0.54 MMOL/L (ref 0.02–0.27)
BASOPHILS # BLD AUTO: 0.5 % (ref 0–1.8)
BASOPHILS # BLD: 0.03 K/UL (ref 0–0.12)
BILIRUB SERPL-MCNC: 0.8 MG/DL (ref 0.1–1.5)
BILIRUB UR QL STRIP.AUTO: NEGATIVE
BUN SERPL-MCNC: 12 MG/DL (ref 8–22)
CALCIUM SERPL-MCNC: 9.5 MG/DL (ref 8.5–10.5)
CHLORIDE SERPL-SCNC: 98 MMOL/L (ref 96–112)
CO2 SERPL-SCNC: 23 MMOL/L (ref 20–33)
COLOR UR: YELLOW
CREAT SERPL-MCNC: 0.6 MG/DL (ref 0.5–1.4)
EOSINOPHIL # BLD AUTO: 0.13 K/UL (ref 0–0.51)
EOSINOPHIL NFR BLD: 2.1 % (ref 0–6.9)
ERYTHROCYTE [DISTWIDTH] IN BLOOD BY AUTOMATED COUNT: 42.2 FL (ref 35.9–50)
FERRITIN SERPL-MCNC: 201 NG/ML (ref 22–322)
GLOBULIN SER CALC-MCNC: 3.2 G/DL (ref 1.9–3.5)
GLUCOSE BLD-MCNC: 215 MG/DL (ref 65–99)
GLUCOSE BLD-MCNC: 233 MG/DL (ref 65–99)
GLUCOSE BLD-MCNC: 250 MG/DL (ref 65–99)
GLUCOSE BLD-MCNC: 399 MG/DL (ref 65–99)
GLUCOSE SERPL-MCNC: 393 MG/DL (ref 65–99)
GLUCOSE UR STRIP.AUTO-MCNC: >=1000 MG/DL
HAV IGM SERPL QL IA: ABNORMAL
HBV CORE IGM SER QL: ABNORMAL
HBV SURFACE AG SER QL: ABNORMAL
HCT VFR BLD AUTO: 44.6 % (ref 42–52)
HCV AB SER QL: REACTIVE
HGB BLD-MCNC: 15 G/DL (ref 14–18)
IMM GRANULOCYTES # BLD AUTO: 0.02 K/UL (ref 0–0.11)
IMM GRANULOCYTES NFR BLD AUTO: 0.3 % (ref 0–0.9)
KETONES UR STRIP.AUTO-MCNC: ABNORMAL MG/DL
LEUKOCYTE ESTERASE UR QL STRIP.AUTO: NEGATIVE
LIPASE SERPL-CCNC: 23 U/L (ref 11–82)
LYMPHOCYTES # BLD AUTO: 1.62 K/UL (ref 1–4.8)
LYMPHOCYTES NFR BLD: 26.1 % (ref 22–41)
MCH RBC QN AUTO: 30.1 PG (ref 27–33)
MCHC RBC AUTO-ENTMCNC: 33.6 G/DL (ref 33.7–35.3)
MCV RBC AUTO: 89.6 FL (ref 81.4–97.8)
MICRO URNS: ABNORMAL
MONOCYTES # BLD AUTO: 0.67 K/UL (ref 0–0.85)
MONOCYTES NFR BLD AUTO: 10.8 % (ref 0–13.4)
NEUTROPHILS # BLD AUTO: 3.73 K/UL (ref 1.82–7.42)
NEUTROPHILS NFR BLD: 60.2 % (ref 44–72)
NITRITE UR QL STRIP.AUTO: NEGATIVE
NRBC # BLD AUTO: 0 K/UL
NRBC BLD-RTO: 0 /100 WBC
PH UR STRIP.AUTO: 5 [PH] (ref 5–8)
PLATELET # BLD AUTO: 218 K/UL (ref 164–446)
PMV BLD AUTO: 11.1 FL (ref 9–12.9)
POTASSIUM SERPL-SCNC: 4.7 MMOL/L (ref 3.6–5.5)
PROT SERPL-MCNC: 7.1 G/DL (ref 6–8.2)
PROT UR QL STRIP: NEGATIVE MG/DL
RBC # BLD AUTO: 4.98 M/UL (ref 4.7–6.1)
RBC UR QL AUTO: NEGATIVE
SODIUM SERPL-SCNC: 133 MMOL/L (ref 135–145)
SP GR UR STRIP.AUTO: 1.04
UROBILINOGEN UR STRIP.AUTO-MCNC: 0.2 MG/DL
WBC # BLD AUTO: 6.2 K/UL (ref 4.8–10.8)

## 2020-05-25 PROCEDURE — 80053 COMPREHEN METABOLIC PANEL: CPT

## 2020-05-25 PROCEDURE — 700102 HCHG RX REV CODE 250 W/ 637 OVERRIDE(OP): Performed by: EMERGENCY MEDICINE

## 2020-05-25 PROCEDURE — 86039 ANTINUCLEAR ANTIBODIES (ANA): CPT

## 2020-05-25 PROCEDURE — 80074 ACUTE HEPATITIS PANEL: CPT

## 2020-05-25 PROCEDURE — 99285 EMERGENCY DEPT VISIT HI MDM: CPT

## 2020-05-25 PROCEDURE — 81003 URINALYSIS AUTO W/O SCOPE: CPT

## 2020-05-25 PROCEDURE — 700105 HCHG RX REV CODE 258: Performed by: EMERGENCY MEDICINE

## 2020-05-25 PROCEDURE — 87522 HEPATITIS C REVRS TRNSCRPJ: CPT

## 2020-05-25 PROCEDURE — 76705 ECHO EXAM OF ABDOMEN: CPT

## 2020-05-25 PROCEDURE — 96374 THER/PROPH/DIAG INJ IV PUSH: CPT

## 2020-05-25 PROCEDURE — 80307 DRUG TEST PRSMV CHEM ANLYZR: CPT

## 2020-05-25 PROCEDURE — 86038 ANTINUCLEAR ANTIBODIES: CPT

## 2020-05-25 PROCEDURE — 85025 COMPLETE CBC W/AUTO DIFF WBC: CPT

## 2020-05-25 PROCEDURE — 82962 GLUCOSE BLOOD TEST: CPT

## 2020-05-25 PROCEDURE — 83690 ASSAY OF LIPASE: CPT

## 2020-05-25 PROCEDURE — 82010 KETONE BODYS QUAN: CPT

## 2020-05-25 PROCEDURE — 71045 X-RAY EXAM CHEST 1 VIEW: CPT

## 2020-05-25 PROCEDURE — 82728 ASSAY OF FERRITIN: CPT

## 2020-05-25 RX ORDER — SODIUM CHLORIDE, SODIUM LACTATE, POTASSIUM CHLORIDE, CALCIUM CHLORIDE 600; 310; 30; 20 MG/100ML; MG/100ML; MG/100ML; MG/100ML
1000 INJECTION, SOLUTION INTRAVENOUS ONCE
Status: COMPLETED | OUTPATIENT
Start: 2020-05-25 | End: 2020-05-25

## 2020-05-25 RX ADMIN — SODIUM CHLORIDE, POTASSIUM CHLORIDE, SODIUM LACTATE AND CALCIUM CHLORIDE 1000 ML: 600; 310; 30; 20 INJECTION, SOLUTION INTRAVENOUS at 16:30

## 2020-05-25 RX ADMIN — INSULIN HUMAN 6 UNITS: 100 INJECTION, SOLUTION PARENTERAL at 18:47

## 2020-05-25 ASSESSMENT — FIBROSIS 4 INDEX: FIB4 SCORE: 1.15

## 2020-05-25 NOTE — ED PROVIDER NOTES
ED Provider Note    Scribed for Chari Simms M.D. by Jairo Navarrete. 5/25/2020  3:49 PM    Primary care provider: Pcp Pt States None  Means of arrival: Walk in  History obtained from: Patient  History limited by: none    CHIEF COMPLAINT  Chief Complaint   Patient presents with   • Medication Refill     out of insulin x3 days       HPI  Gerber Langston is a 34 y.o. male with a history of type 1 diabetes, who presents to the Emergency Department for a medication refill. He currently has increased thirst, and urinary frequency. Patient also reports a chronic cough since January 2019, which is exacerbated with deep inspirations. Per the patient, he was in halfway for the last 6 months and was released last week.  He states he had a few days of insulin upon released from halfway, but he notes he has been out of his insulin for the past 3 days. The patient normally takes 20 of Lantus in the morning and 20 at night. He also takes Humalog sliding scale as needed. His blood sugars normally run in the 400s. He denies any abdominal pain or vomiting. No phlegm producing coughs or hemoptysis. No chest pain. He also denies prior history of DKA or any other medical history. Patient occasionally drinks alcohol with his last alcoholic beverage being a couple days ago. Patient was diagnosed with Type 1 diabetes two years ago.     REVIEW OF SYSTEMS  Positives include increased thirst and urinary frequency. Chronic cough   Negatives include no abdominal pain or vomiting. No phlegm producing coughs or hemoptysis. No chest pain.   See HPI for further details.    All systems are otherwise negative    PAST MEDICAL HISTORY   has a past medical history of Diabetes (HCC) and Hepatitis C.    SURGICAL HISTORY   has a past surgical history that includes hernia repair (Left, 4/2016).    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 No pertinent family history.    CURRENT MEDICATIONS  Home Medications     Reviewed by Casie Bangura R.N. (Registered Nurse) on 05/25/20 at 1407  Med List Status: Partial   Medication Last Dose Status   insulin glargine (LANTUS) 100 UNIT/ML Solution Pen-injector injection  Active   insulin regular human (HUMULIN/NOVOLIN R)  Active                ALLERGIES  No Known Allergies    PHYSICAL EXAM  VITAL SIGNS: /86   Pulse 76   Temp 37.2 °C (98.9 °F) (Temporal)   Resp 18   Ht 1.549 m (5' 1\")   Wt 65.7 kg (144 lb 13.5 oz)   SpO2 99%   BMI 27.37 kg/m²   Constitutional: Well developed, well nourished; No acute distress; Non-toxic appearance.   HENT: Normocephalic, atraumatic; Bilateral external ears normal; Oropharynx with slightly dry mucous membranes; No erythema or exudates in the posterior oropharynx.   Eyes: PERRL, EOMI, Conjunctiva normal. No discharge.   Neck:  Supple, nontender midline; No stridor; No nuchal rigidity.   Lymphatic: No cervical lymphadenopathy noted.   Cardiovascular: Regular rate and rhythm without murmurs, rubs, or gallop.   Thorax & Lungs: No respiratory distress, breath sounds clear to auscultation bilaterally without wheezing, rales or rhonchi. Nontender chest wall. No crepitus or subcutaneous air  Abdomen: Soft, nontender, bowel sounds normal. No obvious masses; No pulsatile masses; no rebound, guarding, or peritoneal signs.   Skin: Good color; warm and dry without rash or petechia.  Back: Nontender, No CVA tenderness.   Extremities: Distal radial, dorsalis pedis, posterior tibial pulses are equal bilaterally; No edema; Nontender calves or saphenous, No cyanosis, No clubbing.   Musculoskeletal: " Good range of motion in all major joints. No tenderness to palpation or major deformities noted.   Neurologic: Alert & oriented x 4, clear speech      LABS  Results for orders placed or performed during the hospital encounter of 05/25/20   CBC WITH DIFFERENTIAL   Result Value Ref Range    WBC 6.2 4.8 - 10.8 K/uL    RBC 4.98 4.70 - 6.10 M/uL    Hemoglobin 15.0 14.0 - 18.0 g/dL    Hematocrit 44.6 42.0 - 52.0 %    MCV 89.6 81.4 - 97.8 fL    MCH 30.1 27.0 - 33.0 pg    MCHC 33.6 (L) 33.7 - 35.3 g/dL    RDW 42.2 35.9 - 50.0 fL    Platelet Count 218 164 - 446 K/uL    MPV 11.1 9.0 - 12.9 fL    Neutrophils-Polys 60.20 44.00 - 72.00 %    Lymphocytes 26.10 22.00 - 41.00 %    Monocytes 10.80 0.00 - 13.40 %    Eosinophils 2.10 0.00 - 6.90 %    Basophils 0.50 0.00 - 1.80 %    Immature Granulocytes 0.30 0.00 - 0.90 %    Nucleated RBC 0.00 /100 WBC    Neutrophils (Absolute) 3.73 1.82 - 7.42 K/uL    Lymphs (Absolute) 1.62 1.00 - 4.80 K/uL    Monos (Absolute) 0.67 0.00 - 0.85 K/uL    Eos (Absolute) 0.13 0.00 - 0.51 K/uL    Baso (Absolute) 0.03 0.00 - 0.12 K/uL    Immature Granulocytes (abs) 0.02 0.00 - 0.11 K/uL    NRBC (Absolute) 0.00 K/uL   COMP METABOLIC PANEL   Result Value Ref Range    Sodium 133 (L) 135 - 145 mmol/L    Potassium 4.7 3.6 - 5.5 mmol/L    Chloride 98 96 - 112 mmol/L    Co2 23 20 - 33 mmol/L    Anion Gap 12.0 7.0 - 16.0    Glucose 393 (H) 65 - 99 mg/dL    Bun 12 8 - 22 mg/dL    Creatinine 0.60 0.50 - 1.40 mg/dL    Calcium 9.5 8.5 - 10.5 mg/dL    AST(SGOT) 317 (H) 12 - 45 U/L    ALT(SGPT) 532 (H) 2 - 50 U/L    Alkaline Phosphatase 121 (H) 30 - 99 U/L    Total Bilirubin 0.8 0.1 - 1.5 mg/dL    Albumin 3.9 3.2 - 4.9 g/dL    Total Protein 7.1 6.0 - 8.2 g/dL    Globulin 3.2 1.9 - 3.5 g/dL    A-G Ratio 1.2 g/dL   BETA-HYDROXYBUTYRIC ACID   Result Value Ref Range    beta-Hydroxybutyric Acid 0.54 (H) 0.02 - 0.27 mmol/L   URINALYSIS (UA)    Specimen: Urine, Clean Catch   Result Value Ref Range    Color Yellow     Character  Clear     Specific Gravity 1.045 <1.035    Ph 5.0 5.0 - 8.0    Glucose >=1000 (A) Negative mg/dL    Ketones Trace (A) Negative mg/dL    Protein Negative Negative mg/dL    Bilirubin Negative Negative    Urobilinogen, Urine 0.2 Negative    Nitrite Negative Negative    Leukocyte Esterase Negative Negative    Occult Blood Negative Negative    Micro Urine Req see below    LIPASE   Result Value Ref Range    Lipase 23 11 - 82 U/L   ESTIMATED GFR   Result Value Ref Range    GFR If African American >60 >60 mL/min/1.73 m 2    GFR If Non African American >60 >60 mL/min/1.73 m 2   FERRITIN   Result Value Ref Range    Ferritin 201.0 22.0 - 322.0 ng/mL   HEPATITIS PANEL ACUTE(4 COMPONENTS)   Result Value Ref Range    Hepatitis B Surface Antigen Non-Reactive Non-Reactive    Hepatitis B Cors Ab,IgM Non-Reactive Non-Reactive    Hepatitis A Virus Ab, IgM Non-Reactive Non-Reactive    Hepatitis C Antibody Reactive (A) Non-Reactive   ACETAMINOPHEN   Result Value Ref Range    Acetaminophen -Tylenol <5 (L) 10 - 30 ug/mL   ACCU-CHEK GLUCOSE   Result Value Ref Range    Glucose - Accu-Ck 399 (H) 65 - 99 mg/dL   ACCU-CHEK GLUCOSE   Result Value Ref Range    Glucose - Accu-Ck 250 (H) 65 - 99 mg/dL   ACCU-CHEK GLUCOSE   Result Value Ref Range    Glucose - Accu-Ck 233 (H) 65 - 99 mg/dL   ACCU-CHEK GLUCOSE   Result Value Ref Range    Glucose - Accu-Ck 215 (H) 65 - 99 mg/dL       All labs reviewed by me.    RADIOLOGY  US-RUQ   Final Result      Contracted gallbladder.      No biliary ductal dilatation is seen.      Limited evaluation of the pancreas which is obscured by overlying bowel gas.      DX-CHEST-PORTABLE (1 VIEW)   Final Result      No acute cardiopulmonary abnormality.        The radiologist's interpretation of all radiological studies have been reviewed by me.    COURSE & MEDICAL DECISION MAKING  Nursing notes and vital signs were reviewed. (See chart for details)    3:49 PM - Patient seen and examined at bedside.  I informed the  patient the need for labs and radiology to rule out any emergent processes. Currently awaiting labs and radiology results before deciding if intervention is necessary. Patient will be informed on the results once I have reviewed them. Patient verbalizes understanding and agreement to this plan of care.     5:54 PM Paged GI.     5:57 PM Spoke with Dr. Mendoza, Gastroenterology, about the patient's condition. Dr. Mendoza said that an ALT in the 500s is pretty high just for hepatitis C so she recommends an ultrasound and some additional labs like a Hepatitis A panel, KATERINA, and ferritin. She will try to get the patient into the office this week.    6:15 PM-Patient was reevaluated at bedside. Discussed lab and radiology results with the patient and informed them on the discussion I had with Dr. Mendoza.Patient verbalizes understanding and agreement to this plan of care. Patient drinks alcohol occasionally. He does not take tylenol regularly.    7:00 PM- The nurse informed me that the patient's accu-chek glucose was 250 so the nurse gave 6 units of insulin. The plan is to monitor the patient for 45 minutes or so.        HYDRATION: Based on the patient's presentation of Hyperglycemia the patient was given IV fluids. IV Hydration was used because oral hydration was not adequate alone. Upon recheck following hydration, the patient was improved.       I verified that the patient was wearing a mask and I was wearing appropriate PPE every time I entered the room. The patient's mask was on the patient at all times during my encounter except for a brief view of the oropharynx.    Decision Making:  The patient presents to the Emergency Department with request for medication refill.  The patient is a type I diabetic.  He takes 20 units of Lantus in the morning and 20 units of Lantus at nighttime.  He is also on sliding scale.  [facility].  He says that he came out of penitentiary with a few days of his  Lantus and Humalog, however he has since run out.  Blood sugars are typically in the 3 and 400s, even when he is taking his medicine.  Blood sugar here today is 399.  He has not had any vomiting.  No abdominal pain.  He says he is never been in DKA before.  He tells me he was diagnosed with diabetes a couple years ago.  He says he has been more thirsty lately and feels as though he has been urinating more, but otherwise he feels fine.  Has had a cough for well over a year now.  Nothing new or different.  No fevers or chills.  Lab work was obtained.  No evidence of DKA.  His LFTs, however, were elevated, more so than previous.  Trending of his LFTs over the last 9 months reveal elevating ALT and AST.  His ALT today is in the 500s.  I spoke with Dr. Mendoza.  She says that an ALT in the 500s is too high for hepatitis C.  She recommends getting a gallbladder ultrasound and adding some additional studies to the patient's lab work.  She will see the patient in the office this coming week.  She will have her office staff contact the patient for an appointment time.  Patient has no abdominal pain.  He has no tenderness on examination.  His gallbladder ultrasound was essentially unremarkable here in the ER today.  He is well-appearing.  His vital signs are normal and stable.  I gave him a liter of fluids and a dose of insulin.  Blood sugar prior to discharge was 230.  He feels well.  I think he is safe and stable for outpatient management discharge home.  He understands he is to follow-up with gastroenterology for elevating liver function tests as well as with primary care to help manage his diabetes..  He has been given strict return precautions and discharge instructions and he understands treatment plan and follow-up.    Discharge Medications: Lantus 20 units twice daily and Humalog sliding scale        DISPOSITION:  Patient will be discharged home in stable condition.    FOLLOW UP:  No follow-up provider  specified.    OUTPATIENT MEDICATIONS:  Current Discharge Medication List             FINAL IMPRESSION  1. Medication refill Acute   2. Hyperglycemia Acute   3. Transaminitis Acute        This dictation has been created using voice recognition software. The accuracy of the dictation is limited by the abilities of the software. I expect there may be some errors of grammar and possibly content. I made every attempt to manually correct the errors within my dictation. However, errors related to voice recognition software may still exist and should be interpreted within the appropriate context.     IJairo (Scribe), am scribing for, and in the presence of, Chari Simms M.D..    Electronically signed by: Jairo Navarrete (Scribe), 5/25/2020    Chari ROWE M.D. personally performed the services described in this documentation, as scribed by Jairo Navarrete in my presence, and it is both accurate and complete. C    The note accurately reflects work and decisions made by me.  Chari Simms M.D.  5/25/2020  8:20 PM

## 2020-05-25 NOTE — ED TRIAGE NOTES
Ambulates to triage  Chief Complaint   Patient presents with   • Medication Refill     out of insulin x3 days     Pt has been off of his insulin for 3 days, takes lantus BID and is on a sliding scale for regular insulin. FS in triage 399, pt said runs 300-500, he has not checked his BS since last Sunday.

## 2020-05-26 NOTE — DISCHARGE INSTRUCTIONS
Follow-up with Dr. Mendoza, gastroenterologist, regarding your elevated liver function test.  Her office should be contacting you tomorrow for an appointment time.  If you do not hear from her office by tomorrow afternoon, please call for your appointment time.  When you talk to the office staff, please tell them that the ER physician spoke to Dr. Mendoza and that she wanted to see you in the office this week.      Also follow-up with the [facility] or with the [facility]\"" [facility] for your diabetes.  Please call for appointment.    Return to the ER for any persistently elevated blood sugars, abdominal pain, nausea, vomiting, worsening cough, shortness of breath, dizziness or lightheadedness, increased thirst, increased urination, headaches, or for any concerns.

## 2020-05-26 NOTE — ED NOTES
Patient discharged with 2 rx, patient verbalized understanding of meds, these are not new to him. Patient given referral for Md to follow up with. All questions answered.

## 2020-05-26 NOTE — ED NOTES
Hand off report given to ANANYA Carmen   Patient chart and current status reviewed with oncoming RN and all questions answered  This RN's primary care of patient terminated at this time

## 2020-05-27 LAB — NUCLEAR IGG SER QL IA: DETECTED

## 2020-05-28 LAB
ANA INTERPRETIVE COMMENT Q5143: ABNORMAL
ANA PATTERN Q5144: ABNORMAL
ANA TITER Q5145: ABNORMAL
ANTINUCLEAR ANTIBODY (ANA), HEP-2, IGG Q5142: DETECTED
HCV RNA SERPL NAA+PROBE-ACNC: ABNORMAL K[IU]/ML
HCV RNA SERPL NAA+PROBE-LOG IU: 5.88 LOG IU/ML
HCV RNA SERPL QL NAA+PROBE: DETECTED

## 2020-07-20 ENCOUNTER — HOSPITAL ENCOUNTER (OUTPATIENT)
Facility: MEDICAL CENTER | Age: 35
End: 2020-07-20
Attending: EMERGENCY MEDICINE | Admitting: HOSPITALIST
Payer: MEDICAID

## 2020-07-20 VITALS
TEMPERATURE: 97.5 F | SYSTOLIC BLOOD PRESSURE: 156 MMHG | OXYGEN SATURATION: 99 % | WEIGHT: 136.47 LBS | HEIGHT: 64 IN | RESPIRATION RATE: 18 BRPM | DIASTOLIC BLOOD PRESSURE: 96 MMHG | HEART RATE: 91 BPM | BODY MASS INDEX: 23.3 KG/M2

## 2020-07-20 DIAGNOSIS — E10.10 DIABETIC KETOACIDOSIS WITHOUT COMA ASSOCIATED WITH TYPE 1 DIABETES MELLITUS (HCC): ICD-10-CM

## 2020-07-20 PROBLEM — B18.2 CHRONIC HEPATITIS C VIRUS INFECTION (HCC): Status: ACTIVE | Noted: 2020-07-20

## 2020-07-20 LAB
ANION GAP SERPL CALC-SCNC: 13 MMOL/L (ref 7–16)
ANION GAP SERPL CALC-SCNC: 19 MMOL/L (ref 7–16)
ANION GAP SERPL CALC-SCNC: 23 MMOL/L (ref 7–16)
APPEARANCE UR: CLEAR
B-OH-BUTYR SERPL-MCNC: 4.12 MMOL/L (ref 0.02–0.27)
BASOPHILS # BLD AUTO: 0.5 % (ref 0–1.8)
BASOPHILS # BLD: 0.04 K/UL (ref 0–0.12)
BILIRUB UR QL STRIP.AUTO: NEGATIVE
BUN SERPL-MCNC: 12 MG/DL (ref 8–22)
BUN SERPL-MCNC: 12 MG/DL (ref 8–22)
BUN SERPL-MCNC: 14 MG/DL (ref 8–22)
CALCIUM SERPL-MCNC: 8.2 MG/DL (ref 8.5–10.5)
CALCIUM SERPL-MCNC: 8.5 MG/DL (ref 8.5–10.5)
CALCIUM SERPL-MCNC: 9.4 MG/DL (ref 8.5–10.5)
CHLORIDE SERPL-SCNC: 93 MMOL/L (ref 96–112)
CHLORIDE SERPL-SCNC: 95 MMOL/L (ref 96–112)
CHLORIDE SERPL-SCNC: 98 MMOL/L (ref 96–112)
CO2 SERPL-SCNC: 16 MMOL/L (ref 20–33)
CO2 SERPL-SCNC: 18 MMOL/L (ref 20–33)
CO2 SERPL-SCNC: 24 MMOL/L (ref 20–33)
COLOR UR: YELLOW
CREAT SERPL-MCNC: 0.41 MG/DL (ref 0.5–1.4)
CREAT SERPL-MCNC: 0.47 MG/DL (ref 0.5–1.4)
CREAT SERPL-MCNC: 0.6 MG/DL (ref 0.5–1.4)
EOSINOPHIL # BLD AUTO: 0.04 K/UL (ref 0–0.51)
EOSINOPHIL NFR BLD: 0.5 % (ref 0–6.9)
ERYTHROCYTE [DISTWIDTH] IN BLOOD BY AUTOMATED COUNT: 41.4 FL (ref 35.9–50)
GLUCOSE BLD-MCNC: 114 MG/DL (ref 65–99)
GLUCOSE BLD-MCNC: 207 MG/DL (ref 65–99)
GLUCOSE SERPL-MCNC: 145 MG/DL (ref 65–99)
GLUCOSE SERPL-MCNC: 293 MG/DL (ref 65–99)
GLUCOSE SERPL-MCNC: 344 MG/DL (ref 65–99)
GLUCOSE UR STRIP.AUTO-MCNC: >=1000 MG/DL
HCT VFR BLD AUTO: 47.5 % (ref 42–52)
HGB BLD-MCNC: 16.8 G/DL (ref 14–18)
IMM GRANULOCYTES # BLD AUTO: 0.03 K/UL (ref 0–0.11)
IMM GRANULOCYTES NFR BLD AUTO: 0.4 % (ref 0–0.9)
KETONES UR STRIP.AUTO-MCNC: >=160 MG/DL
LEUKOCYTE ESTERASE UR QL STRIP.AUTO: NEGATIVE
LYMPHOCYTES # BLD AUTO: 1.63 K/UL (ref 1–4.8)
LYMPHOCYTES NFR BLD: 20.1 % (ref 22–41)
MAGNESIUM SERPL-MCNC: 1.8 MG/DL (ref 1.5–2.5)
MCH RBC QN AUTO: 31.3 PG (ref 27–33)
MCHC RBC AUTO-ENTMCNC: 35.4 G/DL (ref 33.7–35.3)
MCV RBC AUTO: 88.6 FL (ref 81.4–97.8)
MICRO URNS: ABNORMAL
MONOCYTES # BLD AUTO: 0.48 K/UL (ref 0–0.85)
MONOCYTES NFR BLD AUTO: 5.9 % (ref 0–13.4)
NEUTROPHILS # BLD AUTO: 5.88 K/UL (ref 1.82–7.42)
NEUTROPHILS NFR BLD: 72.6 % (ref 44–72)
NITRITE UR QL STRIP.AUTO: NEGATIVE
NRBC # BLD AUTO: 0 K/UL
NRBC BLD-RTO: 0 /100 WBC
PH UR STRIP.AUTO: 5 [PH] (ref 5–8)
PHOSPHATE SERPL-MCNC: 2.3 MG/DL (ref 2.5–4.5)
PLATELET # BLD AUTO: 306 K/UL (ref 164–446)
PMV BLD AUTO: 10.7 FL (ref 9–12.9)
POTASSIUM SERPL-SCNC: 3.5 MMOL/L (ref 3.6–5.5)
POTASSIUM SERPL-SCNC: 3.8 MMOL/L (ref 3.6–5.5)
POTASSIUM SERPL-SCNC: 4.1 MMOL/L (ref 3.6–5.5)
PROT UR QL STRIP: NEGATIVE MG/DL
RBC # BLD AUTO: 5.36 M/UL (ref 4.7–6.1)
RBC UR QL AUTO: NEGATIVE
SODIUM SERPL-SCNC: 130 MMOL/L (ref 135–145)
SODIUM SERPL-SCNC: 134 MMOL/L (ref 135–145)
SODIUM SERPL-SCNC: 135 MMOL/L (ref 135–145)
SP GR UR STRIP.AUTO: 1.04
UROBILINOGEN UR STRIP.AUTO-MCNC: 0.2 MG/DL
WBC # BLD AUTO: 8.1 K/UL (ref 4.8–10.8)

## 2020-07-20 PROCEDURE — C9803 HOPD COVID-19 SPEC COLLECT: HCPCS | Performed by: HOSPITALIST

## 2020-07-20 PROCEDURE — G0378 HOSPITAL OBSERVATION PER HR: HCPCS

## 2020-07-20 PROCEDURE — 82010 KETONE BODYS QUAN: CPT

## 2020-07-20 PROCEDURE — 99234 HOSP IP/OBS SM DT SF/LOW 45: CPT | Performed by: HOSPITALIST

## 2020-07-20 PROCEDURE — 84100 ASSAY OF PHOSPHORUS: CPT

## 2020-07-20 PROCEDURE — 700105 HCHG RX REV CODE 258: Performed by: EMERGENCY MEDICINE

## 2020-07-20 PROCEDURE — 700102 HCHG RX REV CODE 250 W/ 637 OVERRIDE(OP): Performed by: HOSPITALIST

## 2020-07-20 PROCEDURE — 83735 ASSAY OF MAGNESIUM: CPT

## 2020-07-20 PROCEDURE — 81003 URINALYSIS AUTO W/O SCOPE: CPT

## 2020-07-20 PROCEDURE — 700105 HCHG RX REV CODE 258: Performed by: HOSPITALIST

## 2020-07-20 PROCEDURE — 96372 THER/PROPH/DIAG INJ SC/IM: CPT | Mod: XU

## 2020-07-20 PROCEDURE — 82962 GLUCOSE BLOOD TEST: CPT | Mod: 91

## 2020-07-20 PROCEDURE — 96365 THER/PROPH/DIAG IV INF INIT: CPT

## 2020-07-20 PROCEDURE — 700102 HCHG RX REV CODE 250 W/ 637 OVERRIDE(OP): Performed by: EMERGENCY MEDICINE

## 2020-07-20 PROCEDURE — 99285 EMERGENCY DEPT VISIT HI MDM: CPT

## 2020-07-20 PROCEDURE — 80048 BASIC METABOLIC PNL TOTAL CA: CPT

## 2020-07-20 PROCEDURE — 700111 HCHG RX REV CODE 636 W/ 250 OVERRIDE (IP): Performed by: EMERGENCY MEDICINE

## 2020-07-20 PROCEDURE — 85025 COMPLETE CBC W/AUTO DIFF WBC: CPT

## 2020-07-20 RX ORDER — LABETALOL HYDROCHLORIDE 5 MG/ML
10 INJECTION, SOLUTION INTRAVENOUS EVERY 4 HOURS PRN
Status: DISCONTINUED | OUTPATIENT
Start: 2020-07-20 | End: 2020-07-20 | Stop reason: HOSPADM

## 2020-07-20 RX ORDER — POTASSIUM CHLORIDE 7.45 MG/ML
10 INJECTION INTRAVENOUS ONCE
Status: COMPLETED | OUTPATIENT
Start: 2020-07-20 | End: 2020-07-20

## 2020-07-20 RX ORDER — DEXTROSE MONOHYDRATE 25 G/50ML
50 INJECTION, SOLUTION INTRAVENOUS
Status: DISCONTINUED | OUTPATIENT
Start: 2020-07-20 | End: 2020-07-20 | Stop reason: HOSPADM

## 2020-07-20 RX ORDER — ENALAPRILAT 1.25 MG/ML
1.25 INJECTION INTRAVENOUS EVERY 6 HOURS PRN
Status: DISCONTINUED | OUTPATIENT
Start: 2020-07-20 | End: 2020-07-20 | Stop reason: HOSPADM

## 2020-07-20 RX ORDER — BISACODYL 10 MG
10 SUPPOSITORY, RECTAL RECTAL
Status: DISCONTINUED | OUTPATIENT
Start: 2020-07-20 | End: 2020-07-20 | Stop reason: HOSPADM

## 2020-07-20 RX ORDER — INSULIN GLARGINE 100 [IU]/ML
10 INJECTION, SOLUTION SUBCUTANEOUS 2 TIMES DAILY
Status: DISCONTINUED | OUTPATIENT
Start: 2020-07-20 | End: 2020-07-20

## 2020-07-20 RX ORDER — AMOXICILLIN 250 MG
2 CAPSULE ORAL 2 TIMES DAILY
Status: DISCONTINUED | OUTPATIENT
Start: 2020-07-20 | End: 2020-07-20 | Stop reason: HOSPADM

## 2020-07-20 RX ORDER — PROMETHAZINE HYDROCHLORIDE 25 MG/1
12.5-25 TABLET ORAL EVERY 4 HOURS PRN
Status: DISCONTINUED | OUTPATIENT
Start: 2020-07-20 | End: 2020-07-20 | Stop reason: HOSPADM

## 2020-07-20 RX ORDER — INSULIN GLARGINE 100 [IU]/ML
15 INJECTION, SOLUTION SUBCUTANEOUS 2 TIMES DAILY
Status: DISCONTINUED | OUTPATIENT
Start: 2020-07-20 | End: 2020-07-20 | Stop reason: HOSPADM

## 2020-07-20 RX ORDER — SODIUM CHLORIDE 9 MG/ML
1000 INJECTION, SOLUTION INTRAVENOUS ONCE
Status: COMPLETED | OUTPATIENT
Start: 2020-07-20 | End: 2020-07-20

## 2020-07-20 RX ORDER — ONDANSETRON 2 MG/ML
4 INJECTION INTRAMUSCULAR; INTRAVENOUS EVERY 4 HOURS PRN
Status: DISCONTINUED | OUTPATIENT
Start: 2020-07-20 | End: 2020-07-20 | Stop reason: HOSPADM

## 2020-07-20 RX ORDER — POLYETHYLENE GLYCOL 3350 17 G/17G
1 POWDER, FOR SOLUTION ORAL
Status: DISCONTINUED | OUTPATIENT
Start: 2020-07-20 | End: 2020-07-20 | Stop reason: HOSPADM

## 2020-07-20 RX ORDER — ACETAMINOPHEN 325 MG/1
650 TABLET ORAL EVERY 6 HOURS PRN
Status: DISCONTINUED | OUTPATIENT
Start: 2020-07-20 | End: 2020-07-20 | Stop reason: HOSPADM

## 2020-07-20 RX ORDER — SODIUM CHLORIDE, SODIUM LACTATE, POTASSIUM CHLORIDE, CALCIUM CHLORIDE 600; 310; 30; 20 MG/100ML; MG/100ML; MG/100ML; MG/100ML
INJECTION, SOLUTION INTRAVENOUS CONTINUOUS
Status: DISCONTINUED | OUTPATIENT
Start: 2020-07-20 | End: 2020-07-20 | Stop reason: HOSPADM

## 2020-07-20 RX ORDER — PROMETHAZINE HYDROCHLORIDE 25 MG/1
12.5-25 SUPPOSITORY RECTAL EVERY 4 HOURS PRN
Status: DISCONTINUED | OUTPATIENT
Start: 2020-07-20 | End: 2020-07-20 | Stop reason: HOSPADM

## 2020-07-20 RX ORDER — ONDANSETRON 4 MG/1
4 TABLET, ORALLY DISINTEGRATING ORAL EVERY 4 HOURS PRN
Status: DISCONTINUED | OUTPATIENT
Start: 2020-07-20 | End: 2020-07-20 | Stop reason: HOSPADM

## 2020-07-20 RX ORDER — PROCHLORPERAZINE EDISYLATE 5 MG/ML
5-10 INJECTION INTRAMUSCULAR; INTRAVENOUS EVERY 4 HOURS PRN
Status: DISCONTINUED | OUTPATIENT
Start: 2020-07-20 | End: 2020-07-20 | Stop reason: HOSPADM

## 2020-07-20 RX ORDER — INSULIN GLARGINE 100 [IU]/ML
10 INJECTION, SOLUTION SUBCUTANEOUS ONCE
Status: COMPLETED | OUTPATIENT
Start: 2020-07-20 | End: 2020-07-20

## 2020-07-20 RX ORDER — INSULIN GLARGINE 100 [IU]/ML
5 INJECTION, SOLUTION SUBCUTANEOUS ONCE
Status: COMPLETED | OUTPATIENT
Start: 2020-07-20 | End: 2020-07-20

## 2020-07-20 RX ADMIN — SODIUM CHLORIDE, POTASSIUM CHLORIDE, SODIUM LACTATE AND CALCIUM CHLORIDE: 600; 310; 30; 20 INJECTION, SOLUTION INTRAVENOUS at 08:37

## 2020-07-20 RX ADMIN — INSULIN HUMAN 4 UNITS: 100 INJECTION, SOLUTION PARENTERAL at 06:46

## 2020-07-20 RX ADMIN — SODIUM CHLORIDE 1000 ML: 9 INJECTION, SOLUTION INTRAVENOUS at 03:45

## 2020-07-20 RX ADMIN — INSULIN HUMAN 10 UNITS: 100 INJECTION, SOLUTION PARENTERAL at 04:11

## 2020-07-20 RX ADMIN — INSULIN GLARGINE 10 UNITS: 100 INJECTION, SOLUTION SUBCUTANEOUS at 05:29

## 2020-07-20 RX ADMIN — POTASSIUM CHLORIDE 10 MEQ: 7.46 INJECTION, SOLUTION INTRAVENOUS at 04:12

## 2020-07-20 RX ADMIN — INSULIN GLARGINE 5 UNITS: 100 INJECTION, SOLUTION SUBCUTANEOUS at 08:39

## 2020-07-20 RX ADMIN — SODIUM CHLORIDE, POTASSIUM CHLORIDE, SODIUM LACTATE AND CALCIUM CHLORIDE: 600; 310; 30; 20 INJECTION, SOLUTION INTRAVENOUS at 05:15

## 2020-07-20 SDOH — HEALTH STABILITY: MENTAL HEALTH: HOW OFTEN DO YOU HAVE 6 OR MORE DRINKS ON ONE OCCASION?: LESS THAN MONTHLY

## 2020-07-20 SDOH — HEALTH STABILITY: MENTAL HEALTH: HOW MANY STANDARD DRINKS CONTAINING ALCOHOL DO YOU HAVE ON A TYPICAL DAY?: 1 OR 2

## 2020-07-20 SDOH — HEALTH STABILITY: MENTAL HEALTH: HOW OFTEN DO YOU HAVE A DRINK CONTAINING ALCOHOL?: 2-3 TIMES A WEEK

## 2020-07-20 ASSESSMENT — LIFESTYLE VARIABLES
EVER FELT BAD OR GUILTY ABOUT YOUR DRINKING: NO
DO YOU DRINK ALCOHOL: NO
TOTAL SCORE: 0
ON A TYPICAL DAY WHEN YOU DRINK ALCOHOL HOW MANY DRINKS DO YOU HAVE: 0
HOW MANY TIMES IN THE PAST YEAR HAVE YOU HAD 5 OR MORE DRINKS IN A DAY: 0
EVER HAD A DRINK FIRST THING IN THE MORNING TO STEADY YOUR NERVES TO GET RID OF A HANGOVER: NO
TOTAL SCORE: 0
HAVE YOU EVER FELT YOU SHOULD CUT DOWN ON YOUR DRINKING: NO
CONSUMPTION TOTAL: NEGATIVE
SUBSTANCE_ABUSE: 0
EVER_SMOKED: YES
HAVE PEOPLE ANNOYED YOU BY CRITICIZING YOUR DRINKING: NO
TOTAL SCORE: 0
AVERAGE NUMBER OF DAYS PER WEEK YOU HAVE A DRINK CONTAINING ALCOHOL: 0
DOES PATIENT WANT TO STOP DRINKING: NO

## 2020-07-20 ASSESSMENT — ENCOUNTER SYMPTOMS
ABDOMINAL PAIN: 0
FALLS: 0
HEARTBURN: 0
SINUS PAIN: 0
DEPRESSION: 0
FEVER: 0
HEADACHES: 0
SHORTNESS OF BREATH: 0
POLYDIPSIA: 0
SORE THROAT: 0
COUGH: 0
SPUTUM PRODUCTION: 0
TINGLING: 0
BLOOD IN STOOL: 0
HALLUCINATIONS: 0
VOMITING: 0
EYE PAIN: 0
NAUSEA: 1
CHILLS: 0
NAUSEA: 0
TREMORS: 0
NECK PAIN: 0
CONSTIPATION: 0
CLAUDICATION: 0
ORTHOPNEA: 0
HEMOPTYSIS: 0
PHOTOPHOBIA: 0
DOUBLE VISION: 0
MYALGIAS: 0
BLURRED VISION: 0
STRIDOR: 0
PALPITATIONS: 0
WHEEZING: 0
PND: 0
DIAPHORESIS: 0
WEAKNESS: 0
WEAKNESS: 1
LOSS OF CONSCIOUSNESS: 0
DIZZINESS: 0
BACK PAIN: 0
BRUISES/BLEEDS EASILY: 0
FLANK PAIN: 0
DIARRHEA: 0

## 2020-07-20 ASSESSMENT — PATIENT HEALTH QUESTIONNAIRE - PHQ9
2. FEELING DOWN, DEPRESSED, IRRITABLE, OR HOPELESS: NOT AT ALL
SUM OF ALL RESPONSES TO PHQ9 QUESTIONS 1 AND 2: 0
SUM OF ALL RESPONSES TO PHQ9 QUESTIONS 1 AND 2: 0
2. FEELING DOWN, DEPRESSED, IRRITABLE, OR HOPELESS: NOT AT ALL
1. LITTLE INTEREST OR PLEASURE IN DOING THINGS: NOT AT ALL
1. LITTLE INTEREST OR PLEASURE IN DOING THINGS: NOT AT ALL
SUM OF ALL RESPONSES TO PHQ9 QUESTIONS 1 AND 2: 0
2. FEELING DOWN, DEPRESSED, IRRITABLE, OR HOPELESS: NOT AT ALL

## 2020-07-20 ASSESSMENT — FIBROSIS 4 INDEX
FIB4 SCORE: 2.02
FIB4 SCORE: 1.44

## 2020-07-20 NOTE — ED NOTES
Rounded on pt, pt is alert and orientated, speaking in full sentences, responds to commands and answers appropriately.  Resp are even and unlabored.  No behavioral indicators of pain. Patient/family updated on wait status, answered questions, addressed needs, ensured call light in reach.

## 2020-07-20 NOTE — ED PROVIDER NOTES
"ED Provider Note    ER Provider Note         CHIEF COMPLAINT  Chief Complaint   Patient presents with   • Medical Clearance       HPI  Gerber Langston is a 34-year-old male who is a type I diabetic who presents having found ketones in his urine at the [facility] care [facility].  He has not been taking his insulin.  He is noncompliant with insulin because he does not like the needles.  He denies any fevers or chills.  He says he has had a chronic cough for years.  He denies any new shortness of breath.  There is no reported nausea or vomiting or abdominal pain.  He has no burning with urination.    REVIEW OF SYSTEMS  See HPI for further details. All other systems are negative.     PAST MEDICAL HISTORY   has a past medical history of Diabetes (HCC) and Hepatitis C.    SURGICAL HISTORY   has a past surgical history that includes hernia repair (Left, 4/2016).    SOCIAL HISTORY  Social History     Tobacco Use   • Smoking status: Current Every Day Smoker     Packs/day: 0.25     Types: Cigarettes   • Smokeless tobacco: Never Used   Substance Use Topics   • Alcohol use: Yes     Comment: socially   • Drug use: No     Comment: hx of meth use, denies use currently      Social History     Substance and Sexual Activity   Drug Use No    Comment: hx of meth use, denies use currently       FAMILY HISTORY  No family history on file.    CURRENT MEDICATIONS  Home Medications    **Home medications have not yet been reviewed for this encounter**         ALLERGIES  No Known Allergies    PHYSICAL EXAM  VITAL SIGNS: /92   Pulse (!) 114   Temp 36.2 °C (97.2 °F) (Oral)   Resp 20   Ht 1.575 m (5' 2\")   Wt 63.5 kg (140 lb)   SpO2 99%   BMI 25.61 kg/m²      Constitutional: Alert in no apparent distress.  HENT: No signs of trauma, Bilateral external ears normal, Nose normal.   Eyes: Pupils are equal and reactive, Conjunctiva normal, Non-icteric.   Neck: Normal range of motion, No tenderness, Supple, No stridor.   Lymphatic: No lymphadenopathy " noted.   Cardiovascular: Regular rate and rhythm, nondisplaced PMI  Thorax & Lungs: No respiratory distress,  No chest tenderness.   Abdomen: Bowel sounds normal, Soft, No tenderness, No masses, No pulsatile masses. No peritoneal signs.  Skin: Warm, Dry, No erythema, No rash.   Back: No bony tenderness, No CVA tenderness.   Extremities: Intact distal pulses, No edema, No tenderness, No cyanosis.  Musculoskeletal: Good range of motion in all major joints. No tenderness to palpation or major deformities noted.   Neurologic: Alert , Normal motor function, Normal sensory function, No focal deficits noted.   Psychiatric: Affect normal, Judgment normal, Mood normal.     DIAGNOSTIC STUDIES / PROCEDURES           LABS  Labs Reviewed   CBC WITH DIFFERENTIAL - Abnormal; Notable for the following components:       Result Value    MCHC 35.4 (*)     Neutrophils-Polys 72.60 (*)     Lymphocytes 20.10 (*)     All other components within normal limits   BASIC METABOLIC PANEL - Abnormal; Notable for the following components:    Sodium 134 (*)     Chloride 93 (*)     Co2 18 (*)     Glucose 344 (*)     Anion Gap 23.0 (*)     All other components within normal limits   BETA-HYDROXYBUTYRIC ACID - Abnormal; Notable for the following components:    beta-Hydroxybutyric Acid 4.12 (*)     All other components within normal limits   URINALYSIS,CULTURE IF INDICATED - Abnormal; Notable for the following components:    Glucose >=1000 (*)     All other components within normal limits   ESTIMATED GFR       All labs reviewed by me.      COURSE & MEDICAL DECISION MAKING  Pertinent Labs & Imaging studies reviewed. (See chart for details)    This is a 34 y.o. male that presents with concern for DKA given there was ketones in his urine at outside facility.  He is mildly tachycardic at this time.  There is no report of infection that appears possible given his history.  I will get a urinalysis to evaluate for ketones and potential infection here which  make it very unlikely.  In addition I will get basic labs as well as a beta hydroxybutyrate to evaluate for diabetic ketoacidosis..     3:21 AM - Patient seen and examined at bedside.      Patient was found to be in mild DKA.  I spoke to the intensivist who recommends that we give the patient IV insulin 5 as well as 10 subcu and 10 of long-acting insulin.  We will admit the patient to the hospitalist.  The patient will be admitted in guarded condition.  IV fluids will also be given as well.    The total critical care time on this patient is 40 minutes, resuscitating patient, speaking with admitting physician, and deciphering test results. This 40 minutes is exclusive of separately billable procedures.          FINAL IMPRESSION  1. Diabetic ketoacidosis without coma associated with type 1 diabetes mellitus (HCC)              Electronically signed by: Juventino Thorne M.D., 7/20/2020

## 2020-07-20 NOTE — ASSESSMENT & PLAN NOTE
A1c 11.7 7 months ago  Has been on no meds as he doesn't like needles.  DW him he will die young and slowly if he cont's on this path  Agrees to one time daily lantus  Interested in diet modification: DM educator consultation  Increase lantus to 15 units BID  Cover with short acting while here  AG still high, CO2 still low  Bolus NS  Give single dose of lantus now  Cont to monitor to make sure he does not drop into DKA

## 2020-07-20 NOTE — H&P
Hospital Medicine History & Physical Note    Date of Service  7/20/2020    Primary Care Physician  Pcp Pt States None    Code Status  Full Code    Chief Complaint  Chief Complaint   Patient presents with   • Medical Clearance       History of Presenting Illness  34 y.o. male who presented 7/20/2020 with past medical history of type 1 diabetes, hepatitis C who comes into the emergency room complaints of fatigue.  Patient states that he has not taken his insulin in over a month.  He feels nauseous but had no episodes of vomiting.  He also has a lower abdominal cramping that started today.  Denies any changes in urine, fever, shortness of breath, chest pain.  He normally takes 20 units of Lantus twice daily and a sliding scale.  Patient does have neuropathy in all 4 extremities.  His last hemoglobin A1c was 11.7 in 12/2019    Review of Systems  Review of Systems   Constitutional: Positive for malaise/fatigue. Negative for chills, diaphoresis and fever.   HENT: Negative for congestion, ear discharge, ear pain, hearing loss, nosebleeds, sinus pain, sore throat and tinnitus.    Eyes: Negative for blurred vision, double vision, photophobia and pain.   Respiratory: Negative for cough, hemoptysis, sputum production, shortness of breath, wheezing and stridor.    Cardiovascular: Negative for chest pain, palpitations, orthopnea, claudication, leg swelling and PND.   Gastrointestinal: Positive for nausea. Negative for abdominal pain, blood in stool, constipation, diarrhea, heartburn, melena and vomiting.   Genitourinary: Negative for dysuria, flank pain, frequency, hematuria and urgency.   Musculoskeletal: Negative for back pain, falls, joint pain, myalgias and neck pain.   Skin: Negative for itching and rash.   Neurological: Negative for dizziness, tingling, tremors, weakness and headaches.   Endo/Heme/Allergies: Negative for environmental allergies and polydipsia. Does not bruise/bleed easily.   Psychiatric/Behavioral:  Negative for depression, hallucinations, substance abuse and suicidal ideas.       Past Medical History   has a past medical history of Diabetes (HCC) and Hepatitis C.    Surgical History   has a past surgical history that includes hernia repair (Left, 4/2016).     Family History  family history is not on file.     Social History   reports that he has been smoking cigarettes. He has been smoking about 0.25 packs per day. He has never used smokeless tobacco. He reports current alcohol use. He reports that he does not use drugs.    Allergies  No Known Allergies    Medications  Prior to Admission Medications   Prescriptions Last Dose Informant Patient Reported? Taking?   insulin regular human (HUMULIN/NOVOLIN R)   No No   Sig: Sliding scale      Facility-Administered Medications: None       Physical Exam  Temp:  [36.2 °C (97.2 °F)] 36.2 °C (97.2 °F)  Pulse:  [112-116] 116  Resp:  [18-22] 21  BP: (143-158)/(89-98) 158/89  SpO2:  [98 %-99 %] 98 %    Physical Exam  Vitals signs and nursing note reviewed.   Constitutional:       General: He is not in acute distress.     Appearance: Normal appearance. He is not ill-appearing, toxic-appearing or diaphoretic.   HENT:      Head: Normocephalic and atraumatic.      Nose: No congestion or rhinorrhea.      Mouth/Throat:      Pharynx: No oropharyngeal exudate or posterior oropharyngeal erythema.   Eyes:      General: No scleral icterus.  Neck:      Musculoskeletal: No neck rigidity or muscular tenderness.      Vascular: No carotid bruit.   Cardiovascular:      Rate and Rhythm: Normal rate and regular rhythm.      Pulses: Normal pulses.      Heart sounds: Normal heart sounds. No murmur. No friction rub. No gallop.    Pulmonary:      Effort: Pulmonary effort is normal. No respiratory distress.      Breath sounds: Normal breath sounds. No stridor. No wheezing or rhonchi.   Abdominal:      General: Abdomen is flat. There is no distension.      Palpations: There is no mass.       Tenderness: There is no abdominal tenderness. There is no left CVA tenderness, guarding or rebound.      Hernia: No hernia is present.   Musculoskeletal: Normal range of motion.         General: No swelling.      Right lower leg: No edema.      Left lower leg: No edema.   Lymphadenopathy:      Cervical: No cervical adenopathy.   Skin:     General: Skin is warm and dry.      Capillary Refill: Capillary refill takes more than 3 seconds.      Coloration: Skin is not jaundiced or pale.      Findings: No bruising or erythema.   Neurological:      Mental Status: He is alert.         Laboratory:  Recent Labs     07/20/20  0323   WBC 8.1   RBC 5.36   HEMOGLOBIN 16.8   HEMATOCRIT 47.5   MCV 88.6   MCH 31.3   MCHC 35.4*   RDW 41.4   PLATELETCT 306   MPV 10.7     Recent Labs     07/20/20  0323   SODIUM 134*   POTASSIUM 3.8   CHLORIDE 93*   CO2 18*   GLUCOSE 344*   BUN 12   CREATININE 0.60   CALCIUM 9.4     Recent Labs     07/20/20  0323   GLUCOSE 344*         No results for input(s): NTPROBNP in the last 72 hours.      No results for input(s): TROPONINT in the last 72 hours.    Imaging:  No orders to display         Assessment/Plan:    * Uncontrolled type 1 diabetes mellitus with hyperglycemia (HCC)- (present on admission)  Assessment & Plan  With elevated anion gap and beta hydroxybutyrate  Start with Lantus 10 units twice daily titrate upwards  High-dose insulin sliding scale  Every 4 hr glucose checks  BMP every 4 hours  Electrolyte replacement    Chronic hepatitis C virus infection (HCC)  Assessment & Plan  Monitor CMP  Right upper quadrant ultrasound in May did not find evidence of cirrhosis

## 2020-07-20 NOTE — CONSULTS
Diabetes Nurse Specialist: Patient admitted with DKA, supposedly off insulin for a month.  Patient states he stopped insulin because he doesn't like needles.  Probably making a little insulin otherwise he would have been admitted sooner.  Does not check blood sugars at home.   Patient has been provided a blood glucose meter in the past.     Will need at discharge.  Order for TruMetrix blood glucose meter and strips.   States he does have insulin at home, but should probably write order for Basaglar (Lantus) insulin

## 2020-07-20 NOTE — PROGRESS NOTES
Hospital Medicine Daily Progress Note    Date of Service  7/20/2020    Chief Complaint  34 y.o. male admitted 7/20/2020 with N and weakness    Hospital Course    PMHx TIDM, Hep C.  On no meds as he doesn't like needles      Interval Problem Update  Feels a little better today but still feels weak.  No more N.  No c/o pain    Consultants/Specialty      Code Status  full    Disposition  Will need GI F/U on DC for Hep C  Agrees to lantus, refuses short acting    Review of Systems  Review of Systems   Constitutional: Positive for malaise/fatigue. Negative for chills and fever.   HENT: Negative for nosebleeds and sore throat.    Eyes: Negative for blurred vision and double vision.   Respiratory: Negative for cough and shortness of breath.    Cardiovascular: Negative for chest pain, palpitations and leg swelling.   Gastrointestinal: Negative for abdominal pain, diarrhea, nausea and vomiting.   Genitourinary: Negative for dysuria and urgency.   Musculoskeletal: Negative for back pain.   Skin: Negative for rash.   Neurological: Positive for weakness. Negative for dizziness, loss of consciousness and headaches.        Physical Exam  Temp:  [36.2 °C (97.2 °F)-36.5 °C (97.7 °F)] 36.5 °C (97.7 °F)  Pulse:  [112-124] 124  Resp:  [18-22] 19  BP: (143-181)/() 181/108  SpO2:  [95 %-99 %] 95 %    Physical Exam  Vitals signs reviewed.   Constitutional:       General: He is not in acute distress.     Appearance: He is well-developed and underweight. He is not diaphoretic.   HENT:      Head: Normocephalic and atraumatic.   Eyes:      Conjunctiva/sclera: Conjunctivae normal.   Neck:      Vascular: No JVD.   Cardiovascular:      Rate and Rhythm: Normal rate.      Heart sounds: No murmur. No gallop.    Pulmonary:      Effort: Pulmonary effort is normal. No respiratory distress.      Breath sounds: No stridor. No wheezing or rales.   Abdominal:      Palpations: Abdomen is soft.      Tenderness: There is no abdominal tenderness. There  is no guarding or rebound.   Musculoskeletal:         General: No tenderness.      Right lower leg: No edema.      Left lower leg: No edema.   Skin:     General: Skin is warm and dry.      Findings: No rash.   Neurological:      Mental Status: He is oriented to person, place, and time. Mental status is at baseline.      Gait: Gait normal.   Psychiatric:         Thought Content: Thought content normal.         Fluids    Intake/Output Summary (Last 24 hours) at 7/20/2020 0751  Last data filed at 7/20/2020 0452  Gross per 24 hour   Intake 1100 ml   Output --   Net 1100 ml       Laboratory  Recent Labs     07/20/20  0323   WBC 8.1   RBC 5.36   HEMOGLOBIN 16.8   HEMATOCRIT 47.5   MCV 88.6   MCH 31.3   MCHC 35.4*   RDW 41.4   PLATELETCT 306   MPV 10.7     Recent Labs     07/20/20 0323 07/20/20 0451   SODIUM 134* 130*   POTASSIUM 3.8 4.1   CHLORIDE 93* 95*   CO2 18* 16*   GLUCOSE 344* 293*   BUN 12 12   CREATININE 0.60 0.47*   CALCIUM 9.4 8.2*                   Imaging  No orders to display        Assessment/Plan  * Uncontrolled type 1 diabetes mellitus with hyperglycemia (HCC)- (present on admission)  Assessment & Plan  A1c 11.7 7 months ago  Has been on no meds as he doesn't like needles.  DW him he will die young and slowly if he cont's on this path  Agrees to one time daily lantus  Interested in diet modification: DM educator consultation  Increase lantus to 15 units BID  Cover with short acting while here  AG still high, CO2 still low  Bolus NS  Give single dose of lantus now  Cont to monitor to make sure he does not drop into DKA    Chronic hepatitis C virus infection (HCC)  Assessment & Plan  Monitor CMP  Right upper quadrant ultrasound in May did not find evidence of cirrhosis  Will try to arrange f/u with GI on d/c for consideration of antiviral therapy       VTE prophylaxis: ambulatory

## 2020-07-20 NOTE — CARE PLAN
Problem: Communication  Goal: The ability to communicate needs accurately and effectively will improve  Outcome: PROGRESSING AS EXPECTED     Problem: Psychosocial Needs:  Goal: Level of anxiety will decrease  Outcome: PROGRESSING AS EXPECTED     Problem: Medication  Goal: Compliance with prescribed medication will improve  Outcome: PROGRESSING AS EXPECTED

## 2020-07-20 NOTE — PROGRESS NOTES
Gerber Lizarragard patient has chosen to leave the hospital against medical advice. The attending physician has not discharged the patient. Patient is not a risk to himself or others. I have discussed with the patient the following:  Physician has not determined patient is ready for discharge, Risks and consequences of leaving the hospital too soon and Benefit of continued hospitalization.      Discharge against medical advice form has been Patient left abruptly - no chance to sign.      Patient is a   and a referral to  was made.    Attending physician has been notified.

## 2020-07-20 NOTE — DISCHARGE SUMMARY
Discharge Summary    CHIEF COMPLAINT ON ADMISSION  Chief Complaint   Patient presents with   • Medical Clearance       Reason for Admission  EMS     Admission Date  7/20/2020    CODE STATUS  Full Code    HPI & HOSPITAL COURSE  This is a 34 y.o. male who has a history of hepatitis C and type 1 diabetes.  He is currently on no medications for his diabetes, he tells me that that is because he does not like needles.  He came in feeling generally weak and overall poorly.  He was brought in by [employer].    On presentation the patient was found to be in borderline DKA with a CO2 of 18 and an anion gap of 23 a glucose of 344.  His beta hydroxybutyrate was elevated 4.12.  He was admitted to a monitored bed and started on Lantus and fluid resuscitation.      I saw the patient the following morning.  I made some adjustments to his insulin regimen, and bolus of fluids.  Repeat labs demonstrated resolution of his borderline DKA to now more normal physiology.  I had several long discussions with him regarding his type 1 diabetes and his hepatitis C.  Emphasized to him that if he continues to take no medications for his diabetes, he is likely to die a slow death at a young age.  We discussed his hepatitis and then explained to him that there are curative therapies for this and that we would arrange follow-up with GI on his discharge so that we could get him lined up for this.  After discussion of all the above, the patient did agree to use Lantus insulin at home, but he refused to use insulin during the day.  He also agreed to stay so that we could have the diabetic educator and nutritionist come in and talk to him about an appropriate diet.    Unfortunately later in the day when the nursing staff and myself or not in the room, the patient removed his IV and left the building.  I was unable to talk to him before he left and therefore have been unable to write him a prescription, or arrange follow-up.    PMHx TIDM,  Hep C.  On no meds as he doesn't like needles     Therefore, he is discharged in guarded and stable condition against medcial advice.    The patient recovered much more quickly than anticipated on admission.    Discharge Date  7/20/2020    FOLLOW UP ITEMS POST DISCHARGE  Is up the patient will find a follow-up, I can not arrange for him unfortunately.    DISCHARGE DIAGNOSES  Principal Problem:    Uncontrolled type 1 diabetes mellitus with hyperglycemia (HCC) POA: Yes  Active Problems:    Chronic hepatitis C virus infection (HCC) POA: Unknown  Resolved Problems:    * No resolved hospital problems. *      FOLLOW UP  No future appointments.  No follow-up provider specified.    MEDICATIONS ON DISCHARGE     Medication List      ASK your doctor about these medications      Instructions   insulin regular human  Commonly known as:  HUMULIN/NOVOLIN R   Sliding scale            Allergies  No Known Allergies    DIET  Orders Placed This Encounter   Procedures   • Diet Order Diabetic     Standing Status:   Standing     Number of Occurrences:   1     Order Specific Question:   Diet:     Answer:   Diabetic [3]       ACTIVITY  As tolerated.  Weight bearing as tolerated    CONSULTATIONS      PROCEDURES      LABORATORY  Lab Results   Component Value Date    SODIUM 135 07/20/2020    POTASSIUM 3.5 (L) 07/20/2020    CHLORIDE 98 07/20/2020    CO2 24 07/20/2020    GLUCOSE 145 (H) 07/20/2020    BUN 14 07/20/2020    CREATININE 0.41 (L) 07/20/2020        Lab Results   Component Value Date    WBC 8.1 07/20/2020    HEMOGLOBIN 16.8 07/20/2020    HEMATOCRIT 47.5 07/20/2020    PLATELETCT 306 07/20/2020

## 2020-07-20 NOTE — PROGRESS NOTES
Assessment completed. Pt A&Ox4.  Respirations even, unlabored on room air.  Pt denies pain.  POC discussed: Sugar checks. Communication board updated.   Bed in locked, lowest position.  Call light and belongings within reach.  Needs met, will continue to monitor.

## 2020-07-20 NOTE — ASSESSMENT & PLAN NOTE
Monitor CMP  Right upper quadrant ultrasound in May did not find evidence of cirrhosis  Will try to arrange f/u with GI on d/c for consideration of antiviral therapy

## 2020-07-20 NOTE — ED TRIAGE NOTES
BIB [facility]ARRY, pt here for medical clearance for incarceration, pt noncompliant diabetic, on EMS arrival BS 309mg/dL, pt denies any complaints

## 2020-07-20 NOTE — CONSULTS
"Diabetes Nurse Specialist:  Patient with type I DM, HbA1c = 11.7%.  Per chart, he reports no insulin in over 1 month.  I am uncertain if he is truely type I DM.  I recommend checking C-Peptide.  He was seen/educated 7/2017 - see note below.  If he is truly type I DM, He will need both Lanntus and fast acting insulin to prevent DKA.  (Order states he agrees to Lantus only, no fast acting insulin)    Oneyda Paris R.N.    Diabetes Health Educator       Consults    Signed    Date of Service:  7/17/2017  6:28 PM                   Diabetes education: Met with Gerber to discuss new dx of diabetes (type one vs type two). Discussed what diabetes was, difference between type one and type two, what effects blood sugars, need for insulin, how insulin works, goals for blood sugars, need for carbohydrates and proteins with every meal, what carbohydrates and proteins were, hyperglycemia, hypoglycemia, DKA and ketones.  Pt was taught to use insulin pens and practiced with saline pens and practice device as insulin covered by his insurance comes in pen form only (Basaglar).   Pt was given a One touch Verio Flex meter and taught to use meter and delica lancet. Encouraged pt to do own finger sticks (stick only) with nursing as well as given own insulin.  Spoke with Dr. Jiang regarding changing insulin from NPH to Glargine ( Lantus in hospital and Basaglar outpatient). Discussed possibility pt may need insulin with meals as as well as correction/sliding scale if he has type one.  Pt works different shifts/schedules and NPH would be difficult to use. Glargine better choice with Humalog give pre meal only. How to give the insulin reviewed with patient. Pt wanted to go home but with blood sugars remaining high, and need to start new insulin, pt agreed to stay until tomorrow am. Handouts given to cover areas discussed. CDE call RD to leave message to see patient before discharge ( per note from 7/15/17 \"pt asleep at this time, DM " "education handouts left at bedside, RD will follow up and provide education\".  Plan: CDE will follow up in am for questions. Pt will need prescriptions for One touch verio test strips and delica lancets to test four times a day. Pt will also need prescription for Basaglar ( Glargine not lantus) kwik pen (box of five), Humalog kwik pen (box of five) with sliding scale written out and for ac only, justo pen needles (box of 100 4 mm). All prescriptions need to have dx code ( either E11.65 for type 2 or E10.65 for type 1), directions, quantity, and refills for Medicaid O to cover. Please call [phone] if needs change.          "

## 2020-12-14 ENCOUNTER — HOSPITAL ENCOUNTER (EMERGENCY)
Facility: MEDICAL CENTER | Age: 35
End: 2020-12-15
Attending: EMERGENCY MEDICINE
Payer: MEDICAID

## 2020-12-14 DIAGNOSIS — E10.65 TYPE 1 DIABETES MELLITUS WITH HYPERGLYCEMIA (HCC): ICD-10-CM

## 2020-12-14 LAB
ALBUMIN SERPL BCP-MCNC: 5.2 G/DL (ref 3.2–4.9)
ALBUMIN/GLOB SERPL: 1.4 G/DL
ALP SERPL-CCNC: 182 U/L (ref 30–99)
ALT SERPL-CCNC: 122 U/L (ref 2–50)
ANION GAP SERPL CALC-SCNC: 18 MMOL/L (ref 7–16)
AST SERPL-CCNC: 38 U/L (ref 12–45)
BASOPHILS # BLD AUTO: 0.5 % (ref 0–1.8)
BASOPHILS # BLD: 0.04 K/UL (ref 0–0.12)
BILIRUB SERPL-MCNC: 0.9 MG/DL (ref 0.1–1.5)
BUN SERPL-MCNC: 13 MG/DL (ref 8–22)
CALCIUM SERPL-MCNC: 10.2 MG/DL (ref 8.5–10.5)
CHLORIDE SERPL-SCNC: 89 MMOL/L (ref 96–112)
CO2 SERPL-SCNC: 20 MMOL/L (ref 20–33)
CREAT SERPL-MCNC: 0.77 MG/DL (ref 0.5–1.4)
EOSINOPHIL # BLD AUTO: 0.06 K/UL (ref 0–0.51)
EOSINOPHIL NFR BLD: 0.8 % (ref 0–6.9)
ERYTHROCYTE [DISTWIDTH] IN BLOOD BY AUTOMATED COUNT: 38.8 FL (ref 35.9–50)
GLOBULIN SER CALC-MCNC: 3.6 G/DL (ref 1.9–3.5)
GLUCOSE BLD-MCNC: 422 MG/DL (ref 65–99)
GLUCOSE SERPL-MCNC: 372 MG/DL (ref 65–99)
HCT VFR BLD AUTO: 52.6 % (ref 42–52)
HGB BLD-MCNC: 18.6 G/DL (ref 14–18)
IMM GRANULOCYTES # BLD AUTO: 0.03 K/UL (ref 0–0.11)
IMM GRANULOCYTES NFR BLD AUTO: 0.4 % (ref 0–0.9)
LIPASE SERPL-CCNC: 16 U/L (ref 11–82)
LYMPHOCYTES # BLD AUTO: 1.79 K/UL (ref 1–4.8)
LYMPHOCYTES NFR BLD: 23.4 % (ref 22–41)
MCH RBC QN AUTO: 30.6 PG (ref 27–33)
MCHC RBC AUTO-ENTMCNC: 35.4 G/DL (ref 33.7–35.3)
MCV RBC AUTO: 86.7 FL (ref 81.4–97.8)
MONOCYTES # BLD AUTO: 0.61 K/UL (ref 0–0.85)
MONOCYTES NFR BLD AUTO: 8 % (ref 0–13.4)
NEUTROPHILS # BLD AUTO: 5.13 K/UL (ref 1.82–7.42)
NEUTROPHILS NFR BLD: 66.9 % (ref 44–72)
NRBC # BLD AUTO: 0 K/UL
NRBC BLD-RTO: 0 /100 WBC
PLATELET # BLD AUTO: 320 K/UL (ref 164–446)
PMV BLD AUTO: 11.5 FL (ref 9–12.9)
POTASSIUM SERPL-SCNC: 3.8 MMOL/L (ref 3.6–5.5)
PROT SERPL-MCNC: 8.8 G/DL (ref 6–8.2)
RBC # BLD AUTO: 6.07 M/UL (ref 4.7–6.1)
SODIUM SERPL-SCNC: 127 MMOL/L (ref 135–145)
WBC # BLD AUTO: 7.7 K/UL (ref 4.8–10.8)

## 2020-12-14 PROCEDURE — 85025 COMPLETE CBC W/AUTO DIFF WBC: CPT

## 2020-12-14 PROCEDURE — 700105 HCHG RX REV CODE 258: Performed by: EMERGENCY MEDICINE

## 2020-12-14 PROCEDURE — 83690 ASSAY OF LIPASE: CPT

## 2020-12-14 PROCEDURE — 80053 COMPREHEN METABOLIC PANEL: CPT

## 2020-12-14 PROCEDURE — 700111 HCHG RX REV CODE 636 W/ 250 OVERRIDE (IP): Performed by: EMERGENCY MEDICINE

## 2020-12-14 PROCEDURE — 99284 EMERGENCY DEPT VISIT MOD MDM: CPT

## 2020-12-14 PROCEDURE — 96374 THER/PROPH/DIAG INJ IV PUSH: CPT

## 2020-12-14 PROCEDURE — 36415 COLL VENOUS BLD VENIPUNCTURE: CPT

## 2020-12-14 PROCEDURE — 82962 GLUCOSE BLOOD TEST: CPT

## 2020-12-14 RX ORDER — ONDANSETRON 2 MG/ML
4 INJECTION INTRAMUSCULAR; INTRAVENOUS ONCE
Status: COMPLETED | OUTPATIENT
Start: 2020-12-14 | End: 2020-12-14

## 2020-12-14 RX ORDER — SODIUM CHLORIDE 9 MG/ML
2000 INJECTION, SOLUTION INTRAVENOUS ONCE
Status: COMPLETED | OUTPATIENT
Start: 2020-12-14 | End: 2020-12-15

## 2020-12-14 RX ADMIN — SODIUM CHLORIDE 2000 ML: 9 INJECTION, SOLUTION INTRAVENOUS at 22:27

## 2020-12-14 RX ADMIN — ONDANSETRON 4 MG: 2 INJECTION INTRAMUSCULAR; INTRAVENOUS at 22:27

## 2020-12-14 ASSESSMENT — FIBROSIS 4 INDEX: FIB4 SCORE: 1.48

## 2020-12-15 VITALS
TEMPERATURE: 98.9 F | HEIGHT: 64 IN | DIASTOLIC BLOOD PRESSURE: 85 MMHG | RESPIRATION RATE: 18 BRPM | BODY MASS INDEX: 21.45 KG/M2 | HEART RATE: 77 BPM | WEIGHT: 125.66 LBS | OXYGEN SATURATION: 100 % | SYSTOLIC BLOOD PRESSURE: 126 MMHG

## 2020-12-15 LAB — GLUCOSE BLD-MCNC: 302 MG/DL (ref 65–99)

## 2020-12-15 PROCEDURE — 82962 GLUCOSE BLOOD TEST: CPT

## 2020-12-15 RX ORDER — ONDANSETRON 4 MG/1
4 TABLET, ORALLY DISINTEGRATING ORAL EVERY 6 HOURS PRN
Qty: 10 TAB | Refills: 0 | Status: SHIPPED | OUTPATIENT
Start: 2020-12-15 | End: 2022-10-07

## 2020-12-15 NOTE — ED NOTES
DC PAPERS AND SCRIPTS PROVIDED TO PT. ALL QUESTIONS ADDRESSED AT THIS TIME. IV REMOVED PRIOR TO DC. PT AMB OFF UNIT WITH FAMILY.

## 2020-12-15 NOTE — ED TRIAGE NOTES
Chief Complaint   Patient presents with   • Vomiting     Pt complains of vomiting x 5 days. Hx diabetes 1, states he has not been taking his blood sugar      Patient's blood sugar 422 in triage. Patient actively vomiting   Pt amb to triage with steady gait for above complaint.   Pt is alert and oriented, speaking in full sentences, follows commands and responds appropriately to questions. Resp are even and unlabored. No obvious acute distress.    Pt placed in lobby. Pt educated on triage process. Pt encouraged to alert staff for any changes.    Vitals:    12/14/20 1857   BP: (!) 163/111   Pulse: (!) 117   Resp: 18   Temp: 36.1 °C (96.9 °F)   SpO2: 96%

## 2020-12-15 NOTE — ED NOTES
Unable to provide urine sample at this time, pt resting comfortably and on cardiac monitor,  at bedside

## 2020-12-15 NOTE — ED PROVIDER NOTES
ED Provider Note    CHIEF COMPLAINT  Chief Complaint   Patient presents with   • Vomiting     Pt complains of vomiting x 5 days. Hx diabetes 1, states he has not been taking his blood sugar        HPI  Gerber Langston is a 35 y.o. male who presents with vomiting.  The patient states his been vomiting over the last 5 days.  He states he cannot keep anything down.  He does have type 1 diabetes but has been noncompliant with his insulin.  He has not been checking his blood sugars.  He does not have any associated fevers.  He has no difficulty with urination.  He has not had any diarrhea.  He does have epigastric discomfort.    REVIEW OF SYSTEMS  See HPI for further details. All other systems are negative.     PAST MEDICAL HISTORY  Past Medical History:   Diagnosis Date   • Hep C w/ coma, chronic (HCC) 01/01/2003   • Diabetes (HCC)     type 1   • Hepatitis C        FAMILY HISTORY  [unfilled]    SOCIAL HISTORY  Social History     Socioeconomic History   • Marital status: Single     Spouse name: Not on file   • Number of children: Not on file   • Years of education: Not on file   • Highest education level: Not on file   Occupational History   • Not on file   Social Needs   • Financial resource strain: Not on file   • Food insecurity     Worry: Not on file     Inability: Not on file   • Transportation needs     Medical: Not on file     Non-medical: Not on file   Tobacco Use   • Smoking status: Current Every Day Smoker     Packs/day: 0.25     Types: Cigarettes   • Smokeless tobacco: Never Used   Substance and Sexual Activity   • Alcohol use: Yes     Alcohol/week: 1.8 oz     Types: 3 Cans of beer per week     Frequency: 2-3 times a week     Drinks per session: 1 or 2     Binge frequency: Less than monthly     Comment: socially   • Drug use: No     Comment: hx of meth use, denies use currently   • Sexual activity: Not Currently   Lifestyle   • Physical activity     Days per week: Not on file     Minutes per session: Not on  "file   • Stress: Not on file   Relationships   • Social connections     Talks on phone: Not on file     Gets together: Not on file     Attends Hinduism service: Not on file     Active member of club or organization: Not on file     Attends meetings of clubs or organizations: Not on file     Relationship status: Not on file   • Intimate partner violence     Fear of current or ex partner: Not on file     Emotionally abused: Not on file     Physically abused: Not on file     Forced sexual activity: Not on file   Other Topics Concern   • Not on file   Social History Narrative   • Not on file       SURGICAL HISTORY  Past Surgical History:   Procedure Laterality Date   • HERNIA REPAIR Left 4/2016    inguinal       CURRENT MEDICATIONS  Home Medications     Reviewed by Alberta Prado R.N. (Registered Nurse) on 12/14/20 at 1918  Med List Status: <None>   Medication Last Dose Status   insulin regular human (HUMULIN/NOVOLIN R)  Active                ALLERGIES  No Known Allergies    PHYSICAL EXAM  VITAL SIGNS: /98   Pulse 100   Temp 37.2 °C (98.9 °F) (Oral)   Resp 16   Ht 1.626 m (5' 4\")   Wt 57 kg (125 lb 10.6 oz)   SpO2 95%   BMI 21.57 kg/m²       Constitutional: Cachectic and chronically ill in appearance  HENT: Normocephalic, Atraumatic, Bilateral external ears normal, Oropharynx dry, No oral exudates, Nose normal.   Eyes: PERRLA, EOMI, Conjunctiva normal, No discharge.   Neck: Normal range of motion, No tenderness, Supple, No stridor.   Lymphatic: No lymphadenopathy noted.   Cardiovascular: Tachycardic heart rate, Normal rhythm, No murmurs, No rubs, No gallops.   Thorax & Lungs: Normal breath sounds, No respiratory distress, No wheezing, No chest tenderness.   Abdomen: Bowel sounds normal, Soft, epigastric tenderness, No masses, No pulsatile masses.   Skin: Warm, Dry, No erythema, No rash.   Back: No tenderness, No CVA tenderness.   Extremities: Intact distal pulses, No edema, No tenderness, No cyanosis, " No clubbing. .   Neurologic: Alert & oriented x 3, Normal motor function, Normal sensory function, No focal deficits noted.   Psychiatric: Affect normal, Judgment normal, Mood normal.     COURSE & MEDICAL DECISION MAKING  Pertinent Labs & Imaging studies reviewed. (See chart for details)  This is a known insulin-dependent diabetic who presents with epigastric pain and vomiting over the last several days.  Laboratory analysis does show poorly controlled diabetes mellitus and a pseudohyponatremia.  He is not acidotic which is comforting from a diabetic ketoacidosis standpoint.  Clinically did appear dry on arrival and was tachycardic and therefore received 2 L of fluid with normal saline for intravascular repletion of suspected dehydration.  The patient has not had any further emesis.  He currently does not have the monitor needed to check his blood sugars and has not been taking his insulin.  I suspect he has significant gastritis as a cause of the epigastric pain from poorly controlled diabetes.  Laboratory analysis does not support cholecystitis nor pancreatitis.  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  The patient's abdomen is nonsurgical on the time of admission.    FINAL IMPRESSION  1.  Hyperglycemia secondary to poorly controlled diabetes mellitus  2.  Dehydration  3.  Epigastric pain    Disposition  The patient will be admitted in stable condition         Electronically signed by: Maxwell Monroe M.D., 12/14/2020 10:24 PM      At the time of admission the patient is asking to be discharged.  He states he can go to [facility] and get testing strips as well as a blood glucose monitor.  I will write a prescription for this.  He is also asking for some medication for nausea.  The patient will return if he changes mind regarding treatment.  He is instructed to take better control of his diabetes and focus on  hydration.

## 2020-12-15 NOTE — DISCHARGE INSTRUCTIONS
Take better control of your blood sugar and focus on hydration    Return for persistent vomiting or further difficulty controlling your blood sugar

## 2020-12-15 NOTE — ED NOTES
Pt rounded on by RN. Pt reports symptoms are the same, no changes to initial triage. Vitals obtained and charted. Labs drawn by tech. Pt remains in lobby, educated to notify staff of changes.

## 2022-10-07 ENCOUNTER — HOSPITAL ENCOUNTER (INPATIENT)
Facility: MEDICAL CENTER | Age: 37
LOS: 3 days | DRG: 639 | End: 2022-10-10
Attending: EMERGENCY MEDICINE | Admitting: HOSPITALIST
Payer: MEDICAID

## 2022-10-07 DIAGNOSIS — E13.10 DIABETIC KETOACIDOSIS WITHOUT COMA ASSOCIATED WITH OTHER SPECIFIED DIABETES MELLITUS (HCC): ICD-10-CM

## 2022-10-07 PROBLEM — E10.10 DKA, TYPE 1, NOT AT GOAL (HCC): Status: ACTIVE | Noted: 2022-10-07

## 2022-10-07 PROBLEM — K21.9 GASTROESOPHAGEAL REFLUX DISEASE WITHOUT ESOPHAGITIS: Status: ACTIVE | Noted: 2022-10-07

## 2022-10-07 LAB
ALBUMIN SERPL BCP-MCNC: 3.7 G/DL (ref 3.2–4.9)
ALBUMIN/GLOB SERPL: 1.2 G/DL
ALP SERPL-CCNC: 131 U/L (ref 30–99)
ALT SERPL-CCNC: 32 U/L (ref 2–50)
ANION GAP SERPL CALC-SCNC: 14 MMOL/L (ref 7–16)
ANION GAP SERPL CALC-SCNC: 17 MMOL/L (ref 7–16)
ANION GAP SERPL CALC-SCNC: 18 MMOL/L (ref 7–16)
ANION GAP SERPL CALC-SCNC: 22 MMOL/L (ref 7–16)
AST SERPL-CCNC: 23 U/L (ref 12–45)
B-OH-BUTYR SERPL-MCNC: 7.14 MMOL/L (ref 0.02–0.27)
BASOPHILS # BLD AUTO: 0.5 % (ref 0–1.8)
BASOPHILS # BLD: 0.04 K/UL (ref 0–0.12)
BILIRUB SERPL-MCNC: 0.6 MG/DL (ref 0.1–1.5)
BUN SERPL-MCNC: 10 MG/DL (ref 8–22)
BUN SERPL-MCNC: 11 MG/DL (ref 8–22)
BUN SERPL-MCNC: 12 MG/DL (ref 8–22)
BUN SERPL-MCNC: 13 MG/DL (ref 8–22)
CALCIUM SERPL-MCNC: 7.8 MG/DL (ref 8.5–10.5)
CALCIUM SERPL-MCNC: 7.9 MG/DL (ref 8.5–10.5)
CALCIUM SERPL-MCNC: 8 MG/DL (ref 8.5–10.5)
CALCIUM SERPL-MCNC: 8.5 MG/DL (ref 8.5–10.5)
CHLORIDE SERPL-SCNC: 100 MMOL/L (ref 96–112)
CHLORIDE SERPL-SCNC: 105 MMOL/L (ref 96–112)
CHLORIDE SERPL-SCNC: 106 MMOL/L (ref 96–112)
CHLORIDE SERPL-SCNC: 93 MMOL/L (ref 96–112)
CO2 SERPL-SCNC: 13 MMOL/L (ref 20–33)
CO2 SERPL-SCNC: 14 MMOL/L (ref 20–33)
CO2 SERPL-SCNC: 14 MMOL/L (ref 20–33)
CO2 SERPL-SCNC: 17 MMOL/L (ref 20–33)
CREAT SERPL-MCNC: 0.64 MG/DL (ref 0.5–1.4)
CREAT SERPL-MCNC: 0.66 MG/DL (ref 0.5–1.4)
CREAT SERPL-MCNC: 0.79 MG/DL (ref 0.5–1.4)
CREAT SERPL-MCNC: 0.91 MG/DL (ref 0.5–1.4)
EOSINOPHIL # BLD AUTO: 0.04 K/UL (ref 0–0.51)
EOSINOPHIL NFR BLD: 0.5 % (ref 0–6.9)
ERYTHROCYTE [DISTWIDTH] IN BLOOD BY AUTOMATED COUNT: 39.8 FL (ref 35.9–50)
EST. AVERAGE GLUCOSE BLD GHB EST-MCNC: 226 MG/DL
GFR SERPLBLD CREATININE-BSD FMLA CKD-EPI: 113 ML/MIN/1.73 M 2
GFR SERPLBLD CREATININE-BSD FMLA CKD-EPI: 119 ML/MIN/1.73 M 2
GFR SERPLBLD CREATININE-BSD FMLA CKD-EPI: 125 ML/MIN/1.73 M 2
GFR SERPLBLD CREATININE-BSD FMLA CKD-EPI: 127 ML/MIN/1.73 M 2
GLOBULIN SER CALC-MCNC: 3.1 G/DL (ref 1.9–3.5)
GLUCOSE BLD STRIP.AUTO-MCNC: 183 MG/DL (ref 65–99)
GLUCOSE BLD STRIP.AUTO-MCNC: 188 MG/DL (ref 65–99)
GLUCOSE BLD STRIP.AUTO-MCNC: 188 MG/DL (ref 65–99)
GLUCOSE BLD STRIP.AUTO-MCNC: 191 MG/DL (ref 65–99)
GLUCOSE BLD STRIP.AUTO-MCNC: 194 MG/DL (ref 65–99)
GLUCOSE BLD STRIP.AUTO-MCNC: 200 MG/DL (ref 65–99)
GLUCOSE BLD STRIP.AUTO-MCNC: 233 MG/DL (ref 65–99)
GLUCOSE BLD STRIP.AUTO-MCNC: 354 MG/DL (ref 65–99)
GLUCOSE BLD STRIP.AUTO-MCNC: 408 MG/DL (ref 65–99)
GLUCOSE SERPL-MCNC: 230 MG/DL (ref 65–99)
GLUCOSE SERPL-MCNC: 274 MG/DL (ref 65–99)
GLUCOSE SERPL-MCNC: 368 MG/DL (ref 65–99)
GLUCOSE SERPL-MCNC: 475 MG/DL (ref 65–99)
HBA1C MFR BLD: 9.5 % (ref 4–5.6)
HCT VFR BLD AUTO: 40.1 % (ref 42–52)
HGB BLD-MCNC: 13.7 G/DL (ref 14–18)
IMM GRANULOCYTES # BLD AUTO: 0.04 K/UL (ref 0–0.11)
IMM GRANULOCYTES NFR BLD AUTO: 0.5 % (ref 0–0.9)
LYMPHOCYTES # BLD AUTO: 1.87 K/UL (ref 1–4.8)
LYMPHOCYTES NFR BLD: 23.7 % (ref 22–41)
MAGNESIUM SERPL-MCNC: 1.7 MG/DL (ref 1.5–2.5)
MCH RBC QN AUTO: 29.3 PG (ref 27–33)
MCHC RBC AUTO-ENTMCNC: 34.2 G/DL (ref 33.7–35.3)
MCV RBC AUTO: 85.7 FL (ref 81.4–97.8)
MONOCYTES # BLD AUTO: 0.48 K/UL (ref 0–0.85)
MONOCYTES NFR BLD AUTO: 6.1 % (ref 0–13.4)
NEUTROPHILS # BLD AUTO: 5.41 K/UL (ref 1.82–7.42)
NEUTROPHILS NFR BLD: 68.7 % (ref 44–72)
NRBC # BLD AUTO: 0 K/UL
NRBC BLD-RTO: 0 /100 WBC
PHOSPHATE SERPL-MCNC: 1.6 MG/DL (ref 2.5–4.5)
PLATELET # BLD AUTO: 236 K/UL (ref 164–446)
PMV BLD AUTO: 10.9 FL (ref 9–12.9)
POTASSIUM SERPL-SCNC: 4.1 MMOL/L (ref 3.6–5.5)
POTASSIUM SERPL-SCNC: 4.2 MMOL/L (ref 3.6–5.5)
POTASSIUM SERPL-SCNC: 4.2 MMOL/L (ref 3.6–5.5)
POTASSIUM SERPL-SCNC: 4.4 MMOL/L (ref 3.6–5.5)
PROT SERPL-MCNC: 6.8 G/DL (ref 6–8.2)
RBC # BLD AUTO: 4.68 M/UL (ref 4.7–6.1)
SODIUM SERPL-SCNC: 129 MMOL/L (ref 135–145)
SODIUM SERPL-SCNC: 132 MMOL/L (ref 135–145)
SODIUM SERPL-SCNC: 135 MMOL/L (ref 135–145)
SODIUM SERPL-SCNC: 137 MMOL/L (ref 135–145)
WBC # BLD AUTO: 7.9 K/UL (ref 4.8–10.8)

## 2022-10-07 PROCEDURE — 36415 COLL VENOUS BLD VENIPUNCTURE: CPT

## 2022-10-07 PROCEDURE — 700102 HCHG RX REV CODE 250 W/ 637 OVERRIDE(OP): Performed by: HOSPITALIST

## 2022-10-07 PROCEDURE — 96375 TX/PRO/DX INJ NEW DRUG ADDON: CPT

## 2022-10-07 PROCEDURE — 700105 HCHG RX REV CODE 258: Performed by: HOSPITALIST

## 2022-10-07 PROCEDURE — 99291 CRITICAL CARE FIRST HOUR: CPT

## 2022-10-07 PROCEDURE — 700101 HCHG RX REV CODE 250: Performed by: HOSPITALIST

## 2022-10-07 PROCEDURE — 700111 HCHG RX REV CODE 636 W/ 250 OVERRIDE (IP): Performed by: HOSPITALIST

## 2022-10-07 PROCEDURE — 82962 GLUCOSE BLOOD TEST: CPT | Mod: 91

## 2022-10-07 PROCEDURE — 770022 HCHG ROOM/CARE - ICU (200)

## 2022-10-07 PROCEDURE — 700111 HCHG RX REV CODE 636 W/ 250 OVERRIDE (IP): Performed by: INTERNAL MEDICINE

## 2022-10-07 PROCEDURE — 80048 BASIC METABOLIC PNL TOTAL CA: CPT | Mod: 91

## 2022-10-07 PROCEDURE — 80053 COMPREHEN METABOLIC PANEL: CPT

## 2022-10-07 PROCEDURE — 84100 ASSAY OF PHOSPHORUS: CPT

## 2022-10-07 PROCEDURE — 82010 KETONE BODYS QUAN: CPT

## 2022-10-07 PROCEDURE — 700105 HCHG RX REV CODE 258: Performed by: EMERGENCY MEDICINE

## 2022-10-07 PROCEDURE — 85025 COMPLETE CBC W/AUTO DIFF WBC: CPT

## 2022-10-07 PROCEDURE — 96366 THER/PROPH/DIAG IV INF ADDON: CPT

## 2022-10-07 PROCEDURE — 99291 CRITICAL CARE FIRST HOUR: CPT | Performed by: INTERNAL MEDICINE

## 2022-10-07 PROCEDURE — 99223 1ST HOSP IP/OBS HIGH 75: CPT | Performed by: HOSPITALIST

## 2022-10-07 PROCEDURE — 83735 ASSAY OF MAGNESIUM: CPT

## 2022-10-07 PROCEDURE — 83036 HEMOGLOBIN GLYCOSYLATED A1C: CPT

## 2022-10-07 RX ORDER — PROMETHAZINE HYDROCHLORIDE 25 MG/1
12.5-25 TABLET ORAL EVERY 4 HOURS PRN
Status: DISCONTINUED | OUTPATIENT
Start: 2022-10-07 | End: 2022-10-10 | Stop reason: HOSPADM

## 2022-10-07 RX ORDER — DEXTROSE AND SODIUM CHLORIDE 10; .45 G/100ML; G/100ML
INJECTION, SOLUTION INTRAVENOUS CONTINUOUS
Status: ACTIVE | OUTPATIENT
Start: 2022-10-07 | End: 2022-10-08

## 2022-10-07 RX ORDER — SODIUM CHLORIDE 9 MG/ML
1000 INJECTION, SOLUTION INTRAVENOUS ONCE
Status: COMPLETED | OUTPATIENT
Start: 2022-10-07 | End: 2022-10-07

## 2022-10-07 RX ORDER — POTASSIUM CHLORIDE 7.45 MG/ML
10 INJECTION INTRAVENOUS ONCE
Status: COMPLETED | OUTPATIENT
Start: 2022-10-07 | End: 2022-10-08

## 2022-10-07 RX ORDER — MAGNESIUM SULFATE HEPTAHYDRATE 40 MG/ML
4 INJECTION, SOLUTION INTRAVENOUS
Status: COMPLETED | OUTPATIENT
Start: 2022-10-07 | End: 2022-10-07

## 2022-10-07 RX ORDER — DEXTROSE AND SODIUM CHLORIDE 5; .9 G/100ML; G/100ML
INJECTION, SOLUTION INTRAVENOUS CONTINUOUS
Status: ACTIVE | OUTPATIENT
Start: 2022-10-07 | End: 2022-10-08

## 2022-10-07 RX ORDER — ENOXAPARIN SODIUM 100 MG/ML
40 INJECTION SUBCUTANEOUS DAILY
Status: DISCONTINUED | OUTPATIENT
Start: 2022-10-07 | End: 2022-10-10 | Stop reason: HOSPADM

## 2022-10-07 RX ORDER — PROMETHAZINE HYDROCHLORIDE 25 MG/1
12.5-25 SUPPOSITORY RECTAL EVERY 4 HOURS PRN
Status: DISCONTINUED | OUTPATIENT
Start: 2022-10-07 | End: 2022-10-10 | Stop reason: HOSPADM

## 2022-10-07 RX ORDER — SODIUM CHLORIDE 9 MG/ML
INJECTION, SOLUTION INTRAVENOUS CONTINUOUS
Status: DISCONTINUED | OUTPATIENT
Start: 2022-10-07 | End: 2022-10-08

## 2022-10-07 RX ORDER — POLYETHYLENE GLYCOL 3350 17 G/17G
1 POWDER, FOR SOLUTION ORAL
Status: DISCONTINUED | OUTPATIENT
Start: 2022-10-07 | End: 2022-10-10 | Stop reason: HOSPADM

## 2022-10-07 RX ORDER — SODIUM CHLORIDE 9 MG/ML
2000 INJECTION, SOLUTION INTRAVENOUS ONCE
Status: DISCONTINUED | OUTPATIENT
Start: 2022-10-07 | End: 2022-10-07

## 2022-10-07 RX ORDER — MAGNESIUM SULFATE HEPTAHYDRATE 40 MG/ML
2 INJECTION, SOLUTION INTRAVENOUS
Status: COMPLETED | OUTPATIENT
Start: 2022-10-07 | End: 2022-10-07

## 2022-10-07 RX ORDER — ONDANSETRON 2 MG/ML
4 INJECTION INTRAMUSCULAR; INTRAVENOUS EVERY 4 HOURS PRN
Status: DISCONTINUED | OUTPATIENT
Start: 2022-10-07 | End: 2022-10-10 | Stop reason: HOSPADM

## 2022-10-07 RX ORDER — POTASSIUM CHLORIDE 7.45 MG/ML
10 INJECTION INTRAVENOUS
Status: DISPENSED | OUTPATIENT
Start: 2022-10-07 | End: 2022-10-07

## 2022-10-07 RX ORDER — SODIUM CHLORIDE, SODIUM LACTATE, POTASSIUM CHLORIDE, AND CALCIUM CHLORIDE .6; .31; .03; .02 G/100ML; G/100ML; G/100ML; G/100ML
2000 INJECTION, SOLUTION INTRAVENOUS ONCE
Status: DISCONTINUED | OUTPATIENT
Start: 2022-10-07 | End: 2022-10-07

## 2022-10-07 RX ORDER — LISINOPRIL 10 MG/1
10 TABLET ORAL DAILY
Status: ON HOLD | COMMUNITY
End: 2022-10-10

## 2022-10-07 RX ORDER — ONDANSETRON 4 MG/1
4 TABLET, ORALLY DISINTEGRATING ORAL EVERY 4 HOURS PRN
Status: DISCONTINUED | OUTPATIENT
Start: 2022-10-07 | End: 2022-10-10 | Stop reason: HOSPADM

## 2022-10-07 RX ORDER — BISACODYL 10 MG
10 SUPPOSITORY, RECTAL RECTAL
Status: DISCONTINUED | OUTPATIENT
Start: 2022-10-07 | End: 2022-10-10 | Stop reason: HOSPADM

## 2022-10-07 RX ORDER — HYDRALAZINE HYDROCHLORIDE 20 MG/ML
10 INJECTION INTRAMUSCULAR; INTRAVENOUS EVERY 4 HOURS PRN
Status: DISCONTINUED | OUTPATIENT
Start: 2022-10-07 | End: 2022-10-10 | Stop reason: HOSPADM

## 2022-10-07 RX ORDER — OMEPRAZOLE 20 MG/1
20 CAPSULE, DELAYED RELEASE ORAL DAILY
Status: DISCONTINUED | OUTPATIENT
Start: 2022-10-08 | End: 2022-10-10 | Stop reason: HOSPADM

## 2022-10-07 RX ORDER — PROCHLORPERAZINE EDISYLATE 5 MG/ML
5-10 INJECTION INTRAMUSCULAR; INTRAVENOUS EVERY 4 HOURS PRN
Status: DISCONTINUED | OUTPATIENT
Start: 2022-10-07 | End: 2022-10-10 | Stop reason: HOSPADM

## 2022-10-07 RX ORDER — POTASSIUM CHLORIDE 7.45 MG/ML
10 INJECTION INTRAVENOUS ONCE
Status: COMPLETED | OUTPATIENT
Start: 2022-10-07 | End: 2022-10-07

## 2022-10-07 RX ORDER — AMOXICILLIN 250 MG
2 CAPSULE ORAL 2 TIMES DAILY
Status: DISCONTINUED | OUTPATIENT
Start: 2022-10-07 | End: 2022-10-10 | Stop reason: HOSPADM

## 2022-10-07 RX ADMIN — SODIUM CHLORIDE 1000 ML: 9 INJECTION, SOLUTION INTRAVENOUS at 14:18

## 2022-10-07 RX ADMIN — INSULIN HUMAN 6.5 UNITS: 100 INJECTION, SOLUTION PARENTERAL at 15:50

## 2022-10-07 RX ADMIN — SODIUM CHLORIDE 3 UNITS/HR: 9 INJECTION, SOLUTION INTRAVENOUS at 15:48

## 2022-10-07 RX ADMIN — POTASSIUM CHLORIDE 10 MEQ: 7.46 INJECTION, SOLUTION INTRAVENOUS at 21:15

## 2022-10-07 RX ADMIN — SODIUM CHLORIDE 1000 ML: 9 INJECTION, SOLUTION INTRAVENOUS at 12:57

## 2022-10-07 RX ADMIN — ONDANSETRON 4 MG: 2 INJECTION INTRAMUSCULAR; INTRAVENOUS at 15:20

## 2022-10-07 RX ADMIN — SODIUM CHLORIDE: 9 INJECTION, SOLUTION INTRAVENOUS at 15:20

## 2022-10-07 RX ADMIN — MAGNESIUM SULFATE HEPTAHYDRATE 2 G: 40 INJECTION, SOLUTION INTRAVENOUS at 18:07

## 2022-10-07 RX ADMIN — SODIUM PHOSPHATE, MONOBASIC, MONOHYDRATE AND SODIUM PHOSPHATE, DIBASIC, ANHYDROUS 30 MMOL: 276; 142 INJECTION, SOLUTION INTRAVENOUS at 20:36

## 2022-10-07 RX ADMIN — ENOXAPARIN SODIUM 40 MG: 40 INJECTION SUBCUTANEOUS at 17:56

## 2022-10-07 RX ADMIN — DEXTROSE AND SODIUM CHLORIDE: 5; 900 INJECTION, SOLUTION INTRAVENOUS at 17:57

## 2022-10-07 ASSESSMENT — ENCOUNTER SYMPTOMS
PSYCHIATRIC NEGATIVE: 1
WEAKNESS: 0
NECK PAIN: 0
BLOOD IN STOOL: 0
VOMITING: 0
BLURRED VISION: 0
SINUS PAIN: 0
SENSORY CHANGE: 0
TREMORS: 0
HEARTBURN: 1
DIARRHEA: 0
NAUSEA: 1
ABDOMINAL PAIN: 0
WEIGHT LOSS: 0
HEMOPTYSIS: 0
MYALGIAS: 0
PALPITATIONS: 0
SPUTUM PRODUCTION: 0
FEVER: 0
BACK PAIN: 0
ORTHOPNEA: 0
CLAUDICATION: 0
CONSTIPATION: 0
WHEEZING: 0
CHILLS: 0
SHORTNESS OF BREATH: 0
BLURRED VISION: 1
COUGH: 0
TINGLING: 0
HEADACHES: 0
DIZZINESS: 0

## 2022-10-07 ASSESSMENT — PATIENT HEALTH QUESTIONNAIRE - PHQ9
2. FEELING DOWN, DEPRESSED, IRRITABLE, OR HOPELESS: NOT AT ALL
1. LITTLE INTEREST OR PLEASURE IN DOING THINGS: NOT AT ALL
SUM OF ALL RESPONSES TO PHQ9 QUESTIONS 1 AND 2: 0

## 2022-10-07 ASSESSMENT — FIBROSIS 4 INDEX
FIB4 SCORE: 0.38
FIB4 SCORE: 0.6

## 2022-10-07 ASSESSMENT — LIFESTYLE VARIABLES: SUBSTANCE_ABUSE: 0

## 2022-10-07 NOTE — ASSESSMENT & PLAN NOTE
- DKA protocol  -a1c 9.5 on 10/7/2022  -DKA 2/2 inadequate home insulin use  -Initial gap 22, bicarb 14, BetaHydroxy +,  Ketones +  -Admitted initially to the ICU and treated with DKA protocol insulin and potassium infusion with Phos and other electrolytes monitored and repleted as needed  -Tolerated p.o., gap closed, transitioned off insulin drip on 10/8  -Transferred to the floor from the ICU on 10/8  -Very aggressive home regimen, per patient, reportedly 40u glargine AM; 20u glargine PM w/ lispro SSI for meal coverage  -Titrating insulin requirements, increasing Lantus to 17 units today (10/9) with medium dose sliding scale insulin; addl 4u insulin glargine cover increased dose given; BGs reflect increased dose  -Hypoglycemia protocol  -Will need new insulin prescription on day of discharge  -Diabetic diet  -Diabetic education on day of discharge  -Restart home lisinopril for renal protection, starting at lower than home dose due to low-normal BP  AG and CO2 now normalized  Start glargine/SSI   As outpt had been on 40 units in the am and 20 units in the hs of glargine with TID short acting  Repeat BMP in am to confirm he's not back tracking  Diabetic education

## 2022-10-07 NOTE — ED TRIAGE NOTES
Vitals:    10/07/22 1139   BP: 130/88   Pulse: 96   Resp: 16   Temp: 36.6 °C (97.8 °F)   SpO2: 98%     Chief Complaint   Patient presents with    Medication Refill     Pt got released from [facility] about a week ago and was not prescribed insulin. He is a type I diabetic and normally takes short and long acting insulin. FSBG 408. He has been trying to get in to a PCP but has not been able to yet as they are all booked.     Pt is ambulatory to and from triage and is alert and oriented x 4.

## 2022-10-07 NOTE — H&P
Hospital Medicine History & Physical Note    Date of Service  10/7/2022    Primary Care Physician  Pcp Pt States None    Consultants  ICU    Code Status  Full Code    Chief Complaint  Chief Complaint   Patient presents with    Medication Refill    High Blood Sugar       History of Presenting Illness  Gerber Langston is a 37 y.o. male who presented 10/7/2022 with past medical history of diabetes mellitus type 1 and is normally on insulin.  Patient states that he just got released from [facility] about a week ago and was not prescribed any insulin.  He came here primarily for a medication refill.  He has nausea but denies any vomiting.  He is noted urinary frequency.  His work-up in emergency department shows elevated blood glucose with increased anion gap.  Patient referred to me for the care and management    Patient does state he has a burning sensation in his throat when he eats anything    Case were discussed with intensivist who agreed to the IMCU.     I discussed the plan of care with patient.    Review of Systems  Review of Systems   Constitutional:  Positive for malaise/fatigue. Negative for chills, fever and weight loss.   HENT: Negative.     Eyes:  Negative for blurred vision.   Respiratory:  Negative for cough, hemoptysis and sputum production.    Cardiovascular:  Negative for chest pain, palpitations, orthopnea and claudication.   Gastrointestinal:  Positive for nausea. Negative for abdominal pain, blood in stool, constipation, diarrhea and vomiting.   Genitourinary:  Positive for frequency.   Musculoskeletal:  Negative for back pain, myalgias and neck pain.   Skin: Negative.    Neurological:  Negative for dizziness, tingling, tremors, sensory change and headaches.   Psychiatric/Behavioral: Negative.       Past Medical History   has a past medical history of Diabetes (HCC), Hep C w/ coma, chronic (01/01/2003), and Hepatitis C.    Surgical History   has a past surgical history that includes hernia repair (Left,  4/2016).     Family History  family history is not on file.   Family history reviewed with patient. There is no family history that is pertinent to the chief complaint.     Social History   reports that he has been smoking cigarettes. He has been smoking an average of .25 packs per day. He has never used smokeless tobacco. He reports current alcohol use of about 1.8 oz per week. He reports that he does not use drugs.    Allergies  No Known Allergies    Medications  Prior to Admission Medications   Prescriptions Last Dose Informant Patient Reported? Taking?   insulin regular human (HUMULIN/NOVOLIN R)   No No   Sig: Sliding scale   ondansetron (ZOFRAN ODT) 4 MG TABLET DISPERSIBLE   No No   Sig: Take 1 Tab by mouth every 6 hours as needed.      Facility-Administered Medications: None       Physical Exam  Temp:  [36.6 °C (97.8 °F)] 36.6 °C (97.8 °F)  Pulse:  [67-96] 67  Resp:  [16] 16  BP: (115-130)/(76-89) 115/76  SpO2:  [97 %-98 %] 97 %  Blood Pressure: 115/76   Temperature: 36.6 °C (97.8 °F)   Pulse: 67   Respiration: 16   Pulse Oximetry: 97 %       Physical Exam  Constitutional:       General: He is not in acute distress.     Appearance: He is not ill-appearing, toxic-appearing or diaphoretic.   HENT:      Mouth/Throat:      Mouth: Mucous membranes are dry.      Pharynx: No oropharyngeal exudate or posterior oropharyngeal erythema.   Eyes:      General: No scleral icterus.        Right eye: No discharge.   Neck:      Vascular: No carotid bruit.   Cardiovascular:      Rate and Rhythm: Normal rate and regular rhythm.      Heart sounds: No murmur heard.    No gallop.   Pulmonary:      Effort: No respiratory distress.      Breath sounds: No stridor. No wheezing, rhonchi or rales.   Chest:      Chest wall: No tenderness.   Abdominal:      General: There is no distension.      Palpations: There is no mass.      Tenderness: There is no abdominal tenderness. There is no guarding or rebound.      Hernia: No hernia is  present.   Musculoskeletal:         General: No swelling, tenderness, deformity or signs of injury. Normal range of motion.      Cervical back: Normal range of motion and neck supple. No rigidity or tenderness.      Right lower leg: No edema.      Left lower leg: No edema.   Skin:     General: Skin is warm.      Capillary Refill: Capillary refill takes 2 to 3 seconds.      Coloration: Skin is not jaundiced or pale.      Findings: No bruising, erythema or rash.   Neurological:      General: No focal deficit present.      Mental Status: He is alert and oriented to person, place, and time.      Cranial Nerves: No cranial nerve deficit.      Motor: No weakness.   Psychiatric:         Mood and Affect: Mood normal.       Laboratory:      Recent Labs     10/07/22  1213   SODIUM 129*   POTASSIUM 4.4   CHLORIDE 93*   CO2 14*   GLUCOSE 475*   BUN 13   CREATININE 0.91   CALCIUM 8.5     Recent Labs     10/07/22  1213   GLUCOSE 475*         No results for input(s): NTPROBNP in the last 72 hours.      No results for input(s): TROPONINT in the last 72 hours.    Imaging:  No orders to display       X-Ray:  I have personally reviewed the images and compared with prior images.    Assessment/Plan:  Justification for Admission Status  will require at least two midnights for appropriate medical management, necessitating inpatient admission because patient expected be here greater than 2 midnights for insulin administration intravenously for better control of his insulin and to close his anion gap    Patient will need a Intermediate Care (Adult and Pediatrics) bed on MEDICAL service .      * DKA, type 1, not at goal (HCC)- (present on admission)  Assessment & Plan  an insulin drip initiated    Continue insulin drip until anion gap is resolved    Check hemoglobin A1c    N.p.o. until anion gap resolved    IV fluid hydration with normal saline      VTE prophylaxis: SCDs/TEDs

## 2022-10-07 NOTE — ED NOTES
"Med Rec Complete per patient  Allergies Reviewed with patient  No antibiotics within the last 30 days  Patient's Preferred Pharmacy: [facility] on [address]       Patient reports that he recently was released from [facility] and was being treated for his diabetes there, with Blood glucose (BG) level checked twice daily.  He also reports that he has not been checking his BG since his release from prison. He was reports that he was being given both a long acting insulin (twice daily) and a short acting insulin on an \"as needed\" basis while he was incarcerated.       "

## 2022-10-08 LAB
ANION GAP SERPL CALC-SCNC: 10 MMOL/L (ref 7–16)
ANION GAP SERPL CALC-SCNC: 13 MMOL/L (ref 7–16)
BUN SERPL-MCNC: 7 MG/DL (ref 8–22)
BUN SERPL-MCNC: 8 MG/DL (ref 8–22)
CALCIUM SERPL-MCNC: 7.1 MG/DL (ref 8.5–10.5)
CALCIUM SERPL-MCNC: 7.4 MG/DL (ref 8.5–10.5)
CHLORIDE SERPL-SCNC: 106 MMOL/L (ref 96–112)
CHLORIDE SERPL-SCNC: 106 MMOL/L (ref 96–112)
CO2 SERPL-SCNC: 17 MMOL/L (ref 20–33)
CO2 SERPL-SCNC: 19 MMOL/L (ref 20–33)
CREAT SERPL-MCNC: 0.54 MG/DL (ref 0.5–1.4)
CREAT SERPL-MCNC: 0.64 MG/DL (ref 0.5–1.4)
GFR SERPLBLD CREATININE-BSD FMLA CKD-EPI: 127 ML/MIN/1.73 M 2
GFR SERPLBLD CREATININE-BSD FMLA CKD-EPI: 133 ML/MIN/1.73 M 2
GLUCOSE BLD STRIP.AUTO-MCNC: 160 MG/DL (ref 65–99)
GLUCOSE BLD STRIP.AUTO-MCNC: 163 MG/DL (ref 65–99)
GLUCOSE BLD STRIP.AUTO-MCNC: 164 MG/DL (ref 65–99)
GLUCOSE BLD STRIP.AUTO-MCNC: 168 MG/DL (ref 65–99)
GLUCOSE BLD STRIP.AUTO-MCNC: 169 MG/DL (ref 65–99)
GLUCOSE BLD STRIP.AUTO-MCNC: 180 MG/DL (ref 65–99)
GLUCOSE BLD STRIP.AUTO-MCNC: 181 MG/DL (ref 65–99)
GLUCOSE BLD STRIP.AUTO-MCNC: 186 MG/DL (ref 65–99)
GLUCOSE BLD STRIP.AUTO-MCNC: 193 MG/DL (ref 65–99)
GLUCOSE BLD STRIP.AUTO-MCNC: 223 MG/DL (ref 65–99)
GLUCOSE BLD STRIP.AUTO-MCNC: 257 MG/DL (ref 65–99)
GLUCOSE BLD STRIP.AUTO-MCNC: 259 MG/DL (ref 65–99)
GLUCOSE SERPL-MCNC: 186 MG/DL (ref 65–99)
GLUCOSE SERPL-MCNC: 253 MG/DL (ref 65–99)
MAGNESIUM SERPL-MCNC: 1.9 MG/DL (ref 1.5–2.5)
PHOSPHATE SERPL-MCNC: 1.5 MG/DL (ref 2.5–4.5)
POTASSIUM SERPL-SCNC: 3.4 MMOL/L (ref 3.6–5.5)
POTASSIUM SERPL-SCNC: 5.1 MMOL/L (ref 3.6–5.5)
SODIUM SERPL-SCNC: 135 MMOL/L (ref 135–145)
SODIUM SERPL-SCNC: 136 MMOL/L (ref 135–145)

## 2022-10-08 PROCEDURE — 80048 BASIC METABOLIC PNL TOTAL CA: CPT

## 2022-10-08 PROCEDURE — 770001 HCHG ROOM/CARE - MED/SURG/GYN PRIV*

## 2022-10-08 PROCEDURE — A9270 NON-COVERED ITEM OR SERVICE: HCPCS | Performed by: HOSPITALIST

## 2022-10-08 PROCEDURE — 700102 HCHG RX REV CODE 250 W/ 637 OVERRIDE(OP): Performed by: HOSPITALIST

## 2022-10-08 PROCEDURE — 700102 HCHG RX REV CODE 250 W/ 637 OVERRIDE(OP): Performed by: STUDENT IN AN ORGANIZED HEALTH CARE EDUCATION/TRAINING PROGRAM

## 2022-10-08 PROCEDURE — 36415 COLL VENOUS BLD VENIPUNCTURE: CPT

## 2022-10-08 PROCEDURE — 700111 HCHG RX REV CODE 636 W/ 250 OVERRIDE (IP): Performed by: HOSPITALIST

## 2022-10-08 PROCEDURE — 84100 ASSAY OF PHOSPHORUS: CPT

## 2022-10-08 PROCEDURE — 700101 HCHG RX REV CODE 250

## 2022-10-08 PROCEDURE — 83735 ASSAY OF MAGNESIUM: CPT

## 2022-10-08 PROCEDURE — 700111 HCHG RX REV CODE 636 W/ 250 OVERRIDE (IP): Performed by: INTERNAL MEDICINE

## 2022-10-08 PROCEDURE — 99233 SBSQ HOSP IP/OBS HIGH 50: CPT | Performed by: HOSPITALIST

## 2022-10-08 PROCEDURE — 700102 HCHG RX REV CODE 250 W/ 637 OVERRIDE(OP): Performed by: INTERNAL MEDICINE

## 2022-10-08 PROCEDURE — 700105 HCHG RX REV CODE 258

## 2022-10-08 PROCEDURE — 82962 GLUCOSE BLOOD TEST: CPT | Mod: 91

## 2022-10-08 PROCEDURE — A9270 NON-COVERED ITEM OR SERVICE: HCPCS | Performed by: INTERNAL MEDICINE

## 2022-10-08 RX ORDER — POTASSIUM CHLORIDE 7.45 MG/ML
10 INJECTION INTRAVENOUS
Status: COMPLETED | OUTPATIENT
Start: 2022-10-08 | End: 2022-10-08

## 2022-10-08 RX ORDER — DEXTROSE MONOHYDRATE 25 G/50ML
25 INJECTION, SOLUTION INTRAVENOUS
Status: DISCONTINUED | OUTPATIENT
Start: 2022-10-08 | End: 2022-10-09

## 2022-10-08 RX ORDER — POTASSIUM CHLORIDE 7.45 MG/ML
10 INJECTION INTRAVENOUS ONCE
Status: COMPLETED | OUTPATIENT
Start: 2022-10-08 | End: 2022-10-08

## 2022-10-08 RX ADMIN — POTASSIUM CHLORIDE 10 MEQ: 7.46 INJECTION, SOLUTION INTRAVENOUS at 00:00

## 2022-10-08 RX ADMIN — OMEPRAZOLE 20 MG: 20 CAPSULE, DELAYED RELEASE ORAL at 05:16

## 2022-10-08 RX ADMIN — INSULIN GLARGINE-YFGN 13 UNITS: 100 INJECTION, SOLUTION SUBCUTANEOUS at 06:43

## 2022-10-08 RX ADMIN — INSULIN HUMAN 2 UNITS: 100 INJECTION, SOLUTION PARENTERAL at 08:57

## 2022-10-08 RX ADMIN — ENOXAPARIN SODIUM 40 MG: 40 INJECTION SUBCUTANEOUS at 16:35

## 2022-10-08 RX ADMIN — POTASSIUM CHLORIDE 10 MEQ: 7.46 INJECTION, SOLUTION INTRAVENOUS at 10:41

## 2022-10-08 RX ADMIN — POTASSIUM PHOSPHATE, MONOBASIC AND POTASSIUM PHOSPHATE, DIBASIC 30 MMOL: 224; 236 INJECTION, SOLUTION, CONCENTRATE INTRAVENOUS at 22:47

## 2022-10-08 RX ADMIN — POTASSIUM CHLORIDE 10 MEQ: 7.46 INJECTION, SOLUTION INTRAVENOUS at 02:09

## 2022-10-08 RX ADMIN — POTASSIUM CHLORIDE 10 MEQ: 7.46 INJECTION, SOLUTION INTRAVENOUS at 07:16

## 2022-10-08 RX ADMIN — POTASSIUM CHLORIDE 10 MEQ: 7.46 INJECTION, SOLUTION INTRAVENOUS at 08:22

## 2022-10-08 RX ADMIN — SENNOSIDES AND DOCUSATE SODIUM 2 TABLET: 50; 8.6 TABLET ORAL at 05:15

## 2022-10-08 RX ADMIN — INSULIN HUMAN 3 UNITS: 100 INJECTION, SOLUTION PARENTERAL at 16:31

## 2022-10-08 RX ADMIN — POTASSIUM CHLORIDE 10 MEQ: 7.46 INJECTION, SOLUTION INTRAVENOUS at 09:27

## 2022-10-08 RX ADMIN — INSULIN HUMAN 3 UNITS: 100 INJECTION, SOLUTION PARENTERAL at 13:35

## 2022-10-08 RX ADMIN — INSULIN HUMAN 5 UNITS: 100 INJECTION, SOLUTION PARENTERAL at 21:00

## 2022-10-08 ASSESSMENT — ENCOUNTER SYMPTOMS
LOSS OF CONSCIOUSNESS: 0
HEADACHES: 0
SORE THROAT: 0
SHORTNESS OF BREATH: 0
VOMITING: 0
FEVER: 0
DIZZINESS: 0
DOUBLE VISION: 0
DIARRHEA: 0
PALPITATIONS: 0
BACK PAIN: 0
COUGH: 0
ABDOMINAL PAIN: 0
NAUSEA: 0
BLURRED VISION: 0
CHILLS: 0

## 2022-10-08 ASSESSMENT — COGNITIVE AND FUNCTIONAL STATUS - GENERAL
SUGGESTED CMS G CODE MODIFIER MOBILITY: CH
DAILY ACTIVITIY SCORE: 24
SUGGESTED CMS G CODE MODIFIER DAILY ACTIVITY: CH
MOBILITY SCORE: 24

## 2022-10-08 ASSESSMENT — LIFESTYLE VARIABLES
CONSUMPTION TOTAL: NEGATIVE
ON A TYPICAL DAY WHEN YOU DRINK ALCOHOL HOW MANY DRINKS DO YOU HAVE: 0
EVER FELT BAD OR GUILTY ABOUT YOUR DRINKING: NO
AVERAGE NUMBER OF DAYS PER WEEK YOU HAVE A DRINK CONTAINING ALCOHOL: 1
DOES PATIENT WANT TO STOP DRINKING: NO
TOTAL SCORE: 0
HAVE PEOPLE ANNOYED YOU BY CRITICIZING YOUR DRINKING: NO
TOTAL SCORE: 0
EVER HAD A DRINK FIRST THING IN THE MORNING TO STEADY YOUR NERVES TO GET RID OF A HANGOVER: NO
ALCOHOL_USE: YES
HOW MANY TIMES IN THE PAST YEAR HAVE YOU HAD 5 OR MORE DRINKS IN A DAY: 0
TOTAL SCORE: 0
HAVE YOU EVER FELT YOU SHOULD CUT DOWN ON YOUR DRINKING: NO

## 2022-10-08 NOTE — PROGRESS NOTES
Report received from ANANYA Goodwin. Patient transported to [facility]620 on insulin gtt (see MAR). Repeat labs at 2000. All belongings and chart with patient. Patient updated on plan of care.

## 2022-10-08 NOTE — PROGRESS NOTES
Report called to Sylvia HARE. Patient transported via wheelchair to [facility] with [facility]. All belongings with patient.

## 2022-10-08 NOTE — ED PROVIDER NOTES
"ED Provider Note    CHIEF COMPLAINT  Chief Complaint   Patient presents with    Medication Refill    High Blood Sugar       HPI  Gerber Langston is a 37 y.o. male who presents to the emergency department complaining of elevated blood sugar.  The patient has a history of diabetes and states that he was discharged from [facility] on Friday and he states he was not given any prescriptions for medications including insulin.  The patient says that since he has been out of [facility] he has been \"eating everything in sight\" and over the last couple of days he has noticed increased thirst and increased urination which she associates with elevated blood sugars.  He does not recognize any other exacerbating or alleviating factors or precipitating events    REVIEW OF SYSTEMS no fever or chills no nausea or vomiting no chest pain or difficulty breathing.  All other systems negative    PAST MEDICAL HISTORY  Past Medical History:   Diagnosis Date    Diabetes (HCC)     type 1    Hep C w/ coma, chronic 01/01/2003    Hepatitis C        FAMILY HISTORY  No family history on file.    SOCIAL HISTORY  Social History     Socioeconomic History    Marital status: Single   Tobacco Use    Smoking status: Every Day     Packs/day: 0.25     Types: Cigarettes    Smokeless tobacco: Never   Vaping Use    Vaping Use: Never used   Substance and Sexual Activity    Alcohol use: Yes     Alcohol/week: 1.8 oz     Types: 3 Cans of beer per week     Comment: socially    Drug use: No     Comment: hx of meth use, denies use currently    Sexual activity: Not Currently       SURGICAL HISTORY  Past Surgical History:   Procedure Laterality Date    HERNIA REPAIR Left 4/2016    inguinal       CURRENT MEDICATIONS  Home Medications       Reviewed by Ssuan Frank (Pharmacy Tech) on 10/07/22 at 1448  Med List Status: Complete     Medication Last Dose Status   insulin regular human (HUMULIN/NOVOLIN R) 2 weeks ago Active   lisinopril (PRINIVIL) 10 MG Tab 9/28/2022 Active " "  NON SPECIFIED 10/1/2022 Active                    ALLERGIES  No Known Allergies    PHYSICAL EXAM  VITAL SIGNS: /64   Pulse 79   Temp 36.6 °C (97.8 °F) (Temporal)   Resp 12   Ht 1.626 m (5' 4\")   Wt 67.1 kg (147 lb 14.9 oz)   SpO2 96%   BMI 25.39 kg/m²    Oxygen saturation is interpreted as adequate  Constitutional: Awake lucid verbal he does not appear toxic or distressed  HENT: Mucous membranes are dry  Eyes: No erythema discharge or jaundice  Neck: Trachea midline no JVD  Cardiovascular: Regular rate and rhythm  Lungs: Clear and equal bilaterally with no apparent difficulty breathing  Abdomen/Back: Soft nontender nondistended no rebound guarding or peritoneal findings  Skin: Warm and dry  Musculoskeletal: No acute bony deformity  Neurologic: Awake verbal moving all extremities    CHART REVIEW  Chart review shows the patient has a history of noncompliance and admit for DKA    Laboratory  Basic metabolic panel shows an elevated blood glucose of 475 the bicarb is low at 14 and the anion gap is elevated at 22 beta hydroxybutyric acid level was elevated at 7.14 calcium is 4.4.  Hemoglobin A1c value is elevated at 9.5.      MEDICAL DECISION MAKING and DISPOSITION  In the emergency department an IV was established the patient was placed on the cardiac monitor and started on intravenous fluids.  I reviewed all the findings with the patient and his family and I have reviewed the case with the ICU attending the patient will be admitted for further evaluation and treatment    IMPRESSION  1.  Diabetic ketoacidosis         Electronically signed by: Robe Gill M.D., 10/7/2022 6:19 PM      "

## 2022-10-08 NOTE — CARE PLAN
The patient is Stable - Low risk of patient condition declining or worsening    Shift Goals  Clinical Goals: Monitor blood sugar levels, keep them stabalized  Patient Goals: Learning how to check blood sugar levels    Progress made toward(s) clinical / shift goals:    Pt able to verbalize need to check blood sugars AC and HS and to dose himself insulin according to the sugar. Pt verbalizes common signs of hypo and hyperglycemia.  Stable on 1unit/hr of insulin overnight. Antus given at end of shift in order to transition to sliding scale.  No acute issues noted overnight.    Patient is not progressing towards the following goals:

## 2022-10-08 NOTE — CONSULTS
"Critical Care Consultation    Date of consult: 10/7/2022    Referring Physician  Gerber Lewis Jr., D.O.    Reason for Consultation  DKA    History of Presenting Illness  37 y.o. male who presented 10/7/2022 with type 1 diabetes and history of hepatitis C.  Patient was released from senior care last Friday and states he was not given any insulin prescriptions.  He states he has been eating everything in sight \"all the good stuff\".  He comes in today with nausea and some pain with swallowing.  He denies vomiting or any other GI symptoms.  He has been urinating more than normal and is very thirsty.  He denies fevers or chills, dizziness or chest pain.  He does endorse blurry vision when his blood pressure was very high but states this is improving now that it is coming back down.    Code Status  Full Code    Review of Systems  Review of Systems   Constitutional:  Positive for malaise/fatigue. Negative for chills, fever and weight loss.   HENT:  Negative for congestion and sinus pain.    Eyes:  Positive for blurred vision.   Respiratory:  Negative for cough, shortness of breath and wheezing.    Cardiovascular:  Negative for chest pain, palpitations and leg swelling.   Gastrointestinal:  Positive for heartburn and nausea. Negative for abdominal pain and vomiting.   Genitourinary:  Negative for dysuria.   Musculoskeletal:  Negative for myalgias.   Neurological:  Negative for dizziness, weakness and headaches.   Psychiatric/Behavioral:  Negative for substance abuse.      Past Medical History   has a past medical history of Diabetes (HCC), Hep C w/ coma, chronic (01/01/2003), and Hepatitis C.    Surgical History   has a past surgical history that includes hernia repair (Left, 4/2016).    Family History  family history is not on file.    Social History   reports that he has been smoking cigarettes. He has been smoking an average of .25 packs per day. He has never used smokeless tobacco. He reports current alcohol use of about " 1.8 oz per week. He reports that he does not use drugs.    Medications  Home Medications       Reviewed by Susan Frank (Pharmacy Tech) on 10/07/22 at 1448  Med List Status: Complete     Medication Last Dose Status   insulin regular human (HUMULIN/NOVOLIN R) 2 weeks ago Active   lisinopril (PRINIVIL) 10 MG Tab 9/28/2022 Active   NON SPECIFIED 10/1/2022 Active                  Current Facility-Administered Medications   Medication Dose Route Frequency Provider Last Rate Last Admin    D10%-0.45% NaCl infusion   Intravenous Continuous Ben Gebrer M.D. MD ALERT-PHARMACY TO CONSULT FOR DKA MONITORING 1 Each  1 Each Other PRN Ben Gerber M.D.        sodium phosphate 30 mmol in 1/2  mL ivpb  30 mmol Intravenous Once PRN Ben Gerber M.D. 125 mL/hr at 10/07/22 2036 30 mmol at 10/07/22 2036    NS infusion   Intravenous Continuous Ben Gerber M.D.   Continue to Floor at 10/07/22 1707    D5 NS infusion   Intravenous Continuous Ben Gerber M.D. 83 mL/hr at 10/07/22 1757 New Bag at 10/07/22 1757    senna-docusate (PERICOLACE or SENOKOT S) 8.6-50 MG per tablet 2 Tablet  2 Tablet Oral BID Ben Gerber M.D.        And    polyethylene glycol/lytes (MIRALAX) PACKET 1 Packet  1 Packet Oral QDAY PRJOVAN Gerber M.D.        And    magnesium hydroxide (MILK OF MAGNESIA) suspension 30 mL  30 mL Oral QDAY PRJOVAN Gerber M.D.        And    bisacodyl (DULCOLAX) suppository 10 mg  10 mg Rectal QDAY PRN Ben Gerber M.D.        enoxaparin (Lovenox) inj 40 mg  40 mg Subcutaneous DAILY AT 1800 Ben Gerber M.D.   40 mg at 10/07/22 1756    ondansetron (ZOFRAN) syringe/vial injection 4 mg  4 mg Intravenous Q4HRS PRJOVAN Gerber M.D.   4 mg at 10/07/22 1520    ondansetron (ZOFRAN ODT) dispertab 4 mg  4 mg Oral Q4HRS PRN Ben A Gerber, M.D.        promethazine (PHENERGAN) tablet 12.5-25 mg  12.5-25 mg Oral Q4HRS PRN Ben Gerber M.D.        promethazine (PHENERGAN)  suppository 12.5-25 mg  12.5-25 mg Rectal Q4HRS PRN Ben Gerber M.D.        prochlorperazine (COMPAZINE) injection 5-10 mg  5-10 mg Intravenous Q4HRS PRN Ben Gerber M.D.        hydrALAZINE (APRESOLINE) injection 10 mg  10 mg Intravenous Q4HRS PRN Ben Gerber M.D.        insulin regular (HumuLIN R) 100 Units in  mL Infusion for DKA  3 Units/hr Intravenous Continuous Ben Gerber M.D. 1 mL/hr at 10/07/22 1920 1 Units/hr at 10/07/22 1920    Adult DKA potassium(K+) replacement scale  1 Each Intravenous Q4HRS Jazmyne Watt M.D.   1 Each at 10/07/22 2000    potassium chloride (KCL) ivpb 10 mEq  10 mEq Intravenous Once Gerber Lewis Jr. D.OJonathan        [START ON 10/8/2022] omeprazole (PRILOSEC) capsule 20 mg  20 mg Oral DAILY Jazmyne Watt M.D.           Allergies  No Known Allergies    Vital Signs last 24 hours  Temp:  [36.6 °C (97.8 °F)-36.8 °C (98.2 °F)] 36.8 °C (98.2 °F)  Pulse:  [58-96] 77  Resp:  [12-33] 33  BP: (107-137)/(64-89) 137/89  SpO2:  [96 %-100 %] 96 %    Physical Exam  Physical Exam  Constitutional:       Appearance: He is ill-appearing.   HENT:      Head: Normocephalic and atraumatic.      Mouth/Throat:      Mouth: Mucous membranes are dry.   Eyes:      Extraocular Movements: Extraocular movements intact.      Pupils: Pupils are equal, round, and reactive to light.   Cardiovascular:      Rate and Rhythm: Regular rhythm. Tachycardia present.      Heart sounds: Normal heart sounds.   Pulmonary:      Effort: Pulmonary effort is normal. No respiratory distress.      Breath sounds: Normal breath sounds. No wheezing or rales.   Abdominal:      General: Abdomen is flat. There is no distension.      Palpations: Abdomen is soft.      Tenderness: There is no abdominal tenderness.   Musculoskeletal:         General: No swelling, tenderness or deformity.   Skin:     General: Skin is warm and dry.   Neurological:      General: No focal deficit present.      Mental Status: He is alert  and oriented to person, place, and time.       Fluids    Intake/Output Summary (Last 24 hours) at 10/7/2022 2330  Last data filed at 10/7/2022 2215  Gross per 24 hour   Intake 1445.82 ml   Output --   Net 1445.82 ml       Laboratory  Recent Results (from the past 48 hour(s))   POCT glucose device results    Collection Time: 10/07/22 11:46 AM   Result Value Ref Range    POC Glucose, Blood 408 (HH) 65 - 99 mg/dL   Basic Metabolic Panel (BMP)    Collection Time: 10/07/22 12:13 PM   Result Value Ref Range    Sodium 129 (L) 135 - 145 mmol/L    Potassium 4.4 3.6 - 5.5 mmol/L    Chloride 93 (L) 96 - 112 mmol/L    Co2 14 (L) 20 - 33 mmol/L    Glucose 475 (H) 65 - 99 mg/dL    Bun 13 8 - 22 mg/dL    Creatinine 0.91 0.50 - 1.40 mg/dL    Calcium 8.5 8.5 - 10.5 mg/dL    Anion Gap 22.0 (H) 7.0 - 16.0   BETA-HYDROXYBUTYRIC ACID    Collection Time: 10/07/22 12:13 PM   Result Value Ref Range    beta-Hydroxybutyric Acid 7.14 (H) 0.02 - 0.27 mmol/L   HEMOGLOBIN A1C    Collection Time: 10/07/22 12:13 PM   Result Value Ref Range    Glycohemoglobin 9.5 (H) 4.0 - 5.6 %    Est Avg Glucose 226 mg/dL   ESTIMATED GFR    Collection Time: 10/07/22 12:13 PM   Result Value Ref Range    GFR (CKD-EPI) 113 >60 mL/min/1.73 m 2   POCT glucose device results    Collection Time: 10/07/22  2:56 PM   Result Value Ref Range    POC Glucose, Blood 354 (H) 65 - 99 mg/dL   CBC with Differential    Collection Time: 10/07/22  3:00 PM   Result Value Ref Range    WBC 7.9 4.8 - 10.8 K/uL    RBC 4.68 (L) 4.70 - 6.10 M/uL    Hemoglobin 13.7 (L) 14.0 - 18.0 g/dL    Hematocrit 40.1 (L) 42.0 - 52.0 %    MCV 85.7 81.4 - 97.8 fL    MCH 29.3 27.0 - 33.0 pg    MCHC 34.2 33.7 - 35.3 g/dL    RDW 39.8 35.9 - 50.0 fL    Platelet Count 236 164 - 446 K/uL    MPV 10.9 9.0 - 12.9 fL    Neutrophils-Polys 68.70 44.00 - 72.00 %    Lymphocytes 23.70 22.00 - 41.00 %    Monocytes 6.10 0.00 - 13.40 %    Eosinophils 0.50 0.00 - 6.90 %    Basophils 0.50 0.00 - 1.80 %    Immature  Granulocytes 0.50 0.00 - 0.90 %    Nucleated RBC 0.00 /100 WBC    Neutrophils (Absolute) 5.41 1.82 - 7.42 K/uL    Lymphs (Absolute) 1.87 1.00 - 4.80 K/uL    Monos (Absolute) 0.48 0.00 - 0.85 K/uL    Eos (Absolute) 0.04 0.00 - 0.51 K/uL    Baso (Absolute) 0.04 0.00 - 0.12 K/uL    Immature Granulocytes (abs) 0.04 0.00 - 0.11 K/uL    NRBC (Absolute) 0.00 K/uL   Comp Metabolic Panel    Collection Time: 10/07/22  3:00 PM   Result Value Ref Range    Sodium 132 (L) 135 - 145 mmol/L    Potassium 4.1 3.6 - 5.5 mmol/L    Chloride 100 96 - 112 mmol/L    Co2 14 (L) 20 - 33 mmol/L    Anion Gap 18.0 (H) 7.0 - 16.0    Glucose 368 (H) 65 - 99 mg/dL    Bun 12 8 - 22 mg/dL    Creatinine 0.79 0.50 - 1.40 mg/dL    Calcium 7.8 (L) 8.5 - 10.5 mg/dL    AST(SGOT) 23 12 - 45 U/L    ALT(SGPT) 32 2 - 50 U/L    Alkaline Phosphatase 131 (H) 30 - 99 U/L    Total Bilirubin 0.6 0.1 - 1.5 mg/dL    Albumin 3.7 3.2 - 4.9 g/dL    Total Protein 6.8 6.0 - 8.2 g/dL    Globulin 3.1 1.9 - 3.5 g/dL    A-G Ratio 1.2 g/dL   Phosphorus    Collection Time: 10/07/22  3:00 PM   Result Value Ref Range    Phosphorus 1.6 (L) 2.5 - 4.5 mg/dL   Magnesium    Collection Time: 10/07/22  3:00 PM   Result Value Ref Range    Magnesium 1.7 1.5 - 2.5 mg/dL   ESTIMATED GFR    Collection Time: 10/07/22  3:00 PM   Result Value Ref Range    GFR (CKD-EPI) 119 >60 mL/min/1.73 m 2   Basic Metabolic Panel (BMP)    Collection Time: 10/07/22  4:15 PM   Result Value Ref Range    Sodium 135 135 - 145 mmol/L    Potassium 4.2 3.6 - 5.5 mmol/L    Chloride 105 96 - 112 mmol/L    Co2 13 (L) 20 - 33 mmol/L    Glucose 274 (H) 65 - 99 mg/dL    Bun 11 8 - 22 mg/dL    Creatinine 0.64 0.50 - 1.40 mg/dL    Calcium 8.0 (L) 8.5 - 10.5 mg/dL    Anion Gap 17.0 (H) 7.0 - 16.0   ESTIMATED GFR    Collection Time: 10/07/22  4:15 PM   Result Value Ref Range    GFR (CKD-EPI) 127 >60 mL/min/1.73 m 2   POCT glucose device results    Collection Time: 10/07/22  5:11 PM   Result Value Ref Range    POC Glucose,  Blood 188 (H) 65 - 99 mg/dL   POCT glucose device results    Collection Time: 10/07/22  6:04 PM   Result Value Ref Range    POC Glucose, Blood 183 (H) 65 - 99 mg/dL   POCT glucose device results    Collection Time: 10/07/22  7:19 PM   Result Value Ref Range    POC Glucose, Blood 233 (H) 65 - 99 mg/dL   Basic Metabolic Panel (BMP)    Collection Time: 10/07/22  8:19 PM   Result Value Ref Range    Sodium 137 135 - 145 mmol/L    Potassium 4.2 3.6 - 5.5 mmol/L    Chloride 106 96 - 112 mmol/L    Co2 17 (L) 20 - 33 mmol/L    Glucose 230 (H) 65 - 99 mg/dL    Bun 10 8 - 22 mg/dL    Creatinine 0.66 0.50 - 1.40 mg/dL    Calcium 7.9 (L) 8.5 - 10.5 mg/dL    Anion Gap 14.0 7.0 - 16.0   ESTIMATED GFR    Collection Time: 10/07/22  8:19 PM   Result Value Ref Range    GFR (CKD-EPI) 125 >60 mL/min/1.73 m 2   POCT glucose device results    Collection Time: 10/07/22  8:22 PM   Result Value Ref Range    POC Glucose, Blood 200 (H) 65 - 99 mg/dL   POCT glucose device results    Collection Time: 10/07/22  9:19 PM   Result Value Ref Range    POC Glucose, Blood 194 (H) 65 - 99 mg/dL   POCT glucose device results    Collection Time: 10/07/22  9:58 PM   Result Value Ref Range    POC Glucose, Blood 188 (H) 65 - 99 mg/dL   POCT glucose device results    Collection Time: 10/07/22 10:51 PM   Result Value Ref Range    POC Glucose, Blood 191 (H) 65 - 99 mg/dL       Imaging  No orders to display       Assessment/Plan  * DKA, type 1, not at goal (HCC)- (present on admission)  Assessment & Plan  - DKA protocol   -- DKA 2/2 inadequate home insulin use   -- Initial gap 22, BetaHydroxy +,  Ketones +   -- Insulin gtt at 1 units/kg/hr   -- Bolus insulin given   -- Fluid resuscitation with 3 L NS in ER   -- NS or LR @ 150ml/hr maintenance   -- When -260 Start D5 1/2 NS at 150ml/hr   -- When BG < 150 Start D10 at 100ml/hr   -- Aggressive electrolyte repletion - pharmacy repletion protocol   -- Stop insulin gtt if K is < 3.3 and replete, restart when K is  > 3.3   -- BMP every 4 hours   -- Mag/Phos every 8 hours   -- Transition off insulin gtt with sub Q LONG ACTING insulin when CO2 > 17 and Gap < 14 x 2 consecutive BMPs   -- NPO until transitioned   -- Diabetic education   -- Diabetic diet once off insulin gtt       Gastroesophageal reflux disease without esophagitis  Assessment & Plan  Plan:  - Omeprazole       Discussed patient condition and risk of morbidity and/or mortality with Hospitalist and RN.    The patient remains critically ill.  Critical care time = 50 minutes in directly providing and coordinating critical care and extensive data review.  No time overlap and excludes procedures.      Jazmyne Watt MD RD  Pulmonary and Critical Care    Available on [employer]

## 2022-10-08 NOTE — PROGRESS NOTES
Hospital Medicine Daily Progress Note    Date of Service  10/8/2022    Chief Complaint  Gerber Langston is a 37 y.o. male admitted 10/7/2022 with DM    Hospital Course  36yo PMHx T1DM.  Was in half-way and just released one week ago unfortunately with no insulin.  Admitted in DKA    Interval Problem Update  Pt feelng better.  No longer having polydypsia or polyuria.  Eating well.  Up and ambulating    I have discussed this patient's plan of care and discharge plan at IDT rounds today with Case Management, Nursing, Nursing leadership, and other members of the IDT team.    Consultants/Specialty      Code Status  Full Code    Disposition  Patient is not medically cleared for discharge.   Anticipate discharge to to home with close outpatient follow-up.  I have placed the appropriate orders for post-discharge needs.    Review of Systems  Review of Systems   Constitutional:  Negative for chills and fever.   HENT:  Negative for nosebleeds and sore throat.    Eyes:  Negative for blurred vision and double vision.   Respiratory:  Negative for cough and shortness of breath.    Cardiovascular:  Negative for chest pain, palpitations and leg swelling.   Gastrointestinal:  Negative for abdominal pain, diarrhea, nausea and vomiting.   Genitourinary:  Negative for dysuria and urgency.   Musculoskeletal:  Negative for back pain.   Skin:  Negative for rash.   Neurological:  Negative for dizziness, loss of consciousness and headaches.      Physical Exam  Temp:  [35.8 °C (96.4 °F)-36.8 °C (98.2 °F)] 35.8 °C (96.4 °F)  Pulse:  [51-93] 93  Resp:  [12-38] 17  BP: ()/(57-89) 126/86  SpO2:  [92 %-100 %] 98 %    Physical Exam  Constitutional:       General: He is not in acute distress.     Appearance: Normal appearance. He is well-developed. He is not diaphoretic.   HENT:      Head: Normocephalic and atraumatic.   Eyes:      Conjunctiva/sclera: Conjunctivae normal.   Neck:      Vascular: No JVD.   Cardiovascular:      Rate and Rhythm: Normal  rate.      Heart sounds: No murmur heard.    No gallop.   Pulmonary:      Effort: Pulmonary effort is normal. No respiratory distress.      Breath sounds: No stridor. No wheezing or rales.   Abdominal:      Palpations: Abdomen is soft.      Tenderness: There is no abdominal tenderness. There is no guarding or rebound.   Musculoskeletal:         General: No tenderness.      Right lower leg: No edema.      Left lower leg: No edema.   Skin:     General: Skin is warm and dry.      Capillary Refill: Capillary refill takes less than 2 seconds.      Coloration: Skin is pale.      Findings: No rash.   Neurological:      Mental Status: He is alert and oriented to person, place, and time.   Psychiatric:         Mood and Affect: Mood normal.         Behavior: Behavior normal.         Thought Content: Thought content normal.       Fluids    Intake/Output Summary (Last 24 hours) at 10/8/2022 1509  Last data filed at 10/8/2022 1200  Gross per 24 hour   Intake 3446.81 ml   Output --   Net 3446.81 ml       Laboratory  Recent Labs     10/07/22  1500   WBC 7.9   RBC 4.68*   HEMOGLOBIN 13.7*   HEMATOCRIT 40.1*   MCV 85.7   MCH 29.3   MCHC 34.2   RDW 39.8   PLATELETCT 236   MPV 10.9     Recent Labs     10/07/22  2019 10/07/22  2332 10/08/22  0458   SODIUM 137 136 135   POTASSIUM 4.2 5.1 3.4*   CHLORIDE 106 106 106   CO2 17* 17* 19*   GLUCOSE 230* 253* 186*   BUN 10 8 7*   CREATININE 0.66 0.64 0.54   CALCIUM 7.9* 7.1* 7.4*                   Imaging  No orders to display        Assessment/Plan  * DKA, type 1, not at goal (HCC)- (present on admission)  Assessment & Plan  AG and CO2 now normalized  Start glargine/SSI   Start metformin       VTE prophylaxis: enoxaparin ppx    I have performed a physical exam and reviewed and updated ROS and Plan today (10/8/2022). In review of yesterday's note (10/7/2022), there are no changes except as documented above.

## 2022-10-08 NOTE — ASSESSMENT & PLAN NOTE
- DKA protocol  -a1c 9.5 on 10/7/2022  -DKA 2/2 inadequate home insulin use  -Initial gap 22, bicarb 14, BetaHydroxy +,  Ketones +  -Admitted initially to the ICU and treated with DKA protocol insulin and potassium infusion with Phos and other electrolytes monitored and repleted as needed  -Tolerated p.o., gap closed, transitioned off insulin drip on 10/8  -Transferred to the floor from the ICU on 10/8  -Very aggressive home regimen, per patient, reportedly 40u glargine AM; 20u glargine PM w/ lispro SSI for meal coverage  -Titrating insulin requirements, increasing Lantus to 17 units today (10/9) with medium dose sliding scale insulin; addl 4u insulin glargine cover increased dose given; BGs reflect increased dose  -Hypoglycemia protocol  -Will need new insulin prescription on day of discharge  -Diabetic diet  -Diabetic education on day of discharge  -Restart home lisinopril for renal protection, starting at lower than home dose due to low-normal BP

## 2022-10-09 PROBLEM — E10.9 TYPE 1 DIABETES (HCC): Status: ACTIVE | Noted: 2022-10-07

## 2022-10-09 PROBLEM — E87.8 ELECTROLYTE ABNORMALITY: Status: ACTIVE | Noted: 2022-10-09

## 2022-10-09 PROBLEM — I10 HYPERTENSION: Status: ACTIVE | Noted: 2022-10-09

## 2022-10-09 LAB
ANION GAP SERPL CALC-SCNC: 10 MMOL/L (ref 7–16)
ANION GAP SERPL CALC-SCNC: 10 MMOL/L (ref 7–16)
BUN SERPL-MCNC: 5 MG/DL (ref 8–22)
BUN SERPL-MCNC: 6 MG/DL (ref 8–22)
CALCIUM SERPL-MCNC: 7.8 MG/DL (ref 8.5–10.5)
CALCIUM SERPL-MCNC: 7.9 MG/DL (ref 8.5–10.5)
CHLORIDE SERPL-SCNC: 106 MMOL/L (ref 96–112)
CHLORIDE SERPL-SCNC: 106 MMOL/L (ref 96–112)
CO2 SERPL-SCNC: 20 MMOL/L (ref 20–33)
CO2 SERPL-SCNC: 22 MMOL/L (ref 20–33)
CREAT SERPL-MCNC: 0.45 MG/DL (ref 0.5–1.4)
CREAT SERPL-MCNC: 0.47 MG/DL (ref 0.5–1.4)
EKG IMPRESSION: NORMAL
GFR SERPLBLD CREATININE-BSD FMLA CKD-EPI: 139 ML/MIN/1.73 M 2
GFR SERPLBLD CREATININE-BSD FMLA CKD-EPI: 141 ML/MIN/1.73 M 2
GLUCOSE BLD STRIP.AUTO-MCNC: 236 MG/DL (ref 65–99)
GLUCOSE BLD STRIP.AUTO-MCNC: 243 MG/DL (ref 65–99)
GLUCOSE BLD STRIP.AUTO-MCNC: 250 MG/DL (ref 65–99)
GLUCOSE BLD STRIP.AUTO-MCNC: 252 MG/DL (ref 65–99)
GLUCOSE BLD STRIP.AUTO-MCNC: 279 MG/DL (ref 65–99)
GLUCOSE SERPL-MCNC: 311 MG/DL (ref 65–99)
GLUCOSE SERPL-MCNC: 321 MG/DL (ref 65–99)
MAGNESIUM SERPL-MCNC: 1.8 MG/DL (ref 1.5–2.5)
PHOSPHATE SERPL-MCNC: 3.4 MG/DL (ref 2.5–4.5)
PHOSPHATE SERPL-MCNC: 9.7 MG/DL (ref 2.5–4.5)
POTASSIUM SERPL-SCNC: 3.8 MMOL/L (ref 3.6–5.5)
POTASSIUM SERPL-SCNC: 6.5 MMOL/L (ref 3.6–5.5)
SODIUM SERPL-SCNC: 136 MMOL/L (ref 135–145)
SODIUM SERPL-SCNC: 138 MMOL/L (ref 135–145)

## 2022-10-09 PROCEDURE — 700111 HCHG RX REV CODE 636 W/ 250 OVERRIDE (IP): Performed by: HOSPITALIST

## 2022-10-09 PROCEDURE — 700102 HCHG RX REV CODE 250 W/ 637 OVERRIDE(OP): Performed by: STUDENT IN AN ORGANIZED HEALTH CARE EDUCATION/TRAINING PROGRAM

## 2022-10-09 PROCEDURE — 93005 ELECTROCARDIOGRAM TRACING: CPT

## 2022-10-09 PROCEDURE — 93010 ELECTROCARDIOGRAM REPORT: CPT | Performed by: INTERNAL MEDICINE

## 2022-10-09 PROCEDURE — 83735 ASSAY OF MAGNESIUM: CPT

## 2022-10-09 PROCEDURE — 700102 HCHG RX REV CODE 250 W/ 637 OVERRIDE(OP): Performed by: INTERNAL MEDICINE

## 2022-10-09 PROCEDURE — 700111 HCHG RX REV CODE 636 W/ 250 OVERRIDE (IP): Performed by: STUDENT IN AN ORGANIZED HEALTH CARE EDUCATION/TRAINING PROGRAM

## 2022-10-09 PROCEDURE — 80048 BASIC METABOLIC PNL TOTAL CA: CPT

## 2022-10-09 PROCEDURE — A9270 NON-COVERED ITEM OR SERVICE: HCPCS | Performed by: INTERNAL MEDICINE

## 2022-10-09 PROCEDURE — 770001 HCHG ROOM/CARE - MED/SURG/GYN PRIV*

## 2022-10-09 PROCEDURE — 36415 COLL VENOUS BLD VENIPUNCTURE: CPT

## 2022-10-09 PROCEDURE — 82962 GLUCOSE BLOOD TEST: CPT | Mod: 91

## 2022-10-09 PROCEDURE — 84100 ASSAY OF PHOSPHORUS: CPT

## 2022-10-09 PROCEDURE — 99232 SBSQ HOSP IP/OBS MODERATE 35: CPT | Mod: GC | Performed by: INTERNAL MEDICINE

## 2022-10-09 RX ORDER — INSULIN LISPRO 100 [IU]/ML
0.2 INJECTION, SOLUTION INTRAVENOUS; SUBCUTANEOUS
Status: DISCONTINUED | OUTPATIENT
Start: 2022-10-09 | End: 2022-10-09 | Stop reason: ALTCHOICE

## 2022-10-09 RX ORDER — MAGNESIUM SULFATE HEPTAHYDRATE 40 MG/ML
2 INJECTION, SOLUTION INTRAVENOUS ONCE
Status: COMPLETED | OUTPATIENT
Start: 2022-10-09 | End: 2022-10-09

## 2022-10-09 RX ORDER — LISINOPRIL 5 MG/1
5 TABLET ORAL DAILY
Status: DISCONTINUED | OUTPATIENT
Start: 2022-10-10 | End: 2022-10-10 | Stop reason: HOSPADM

## 2022-10-09 RX ORDER — INSULIN LISPRO 100 [IU]/ML
2-9 INJECTION, SOLUTION INTRAVENOUS; SUBCUTANEOUS
Status: DISCONTINUED | OUTPATIENT
Start: 2022-10-09 | End: 2022-10-10 | Stop reason: HOSPADM

## 2022-10-09 RX ORDER — DEXTROSE MONOHYDRATE 25 G/50ML
25 INJECTION, SOLUTION INTRAVENOUS
Status: DISCONTINUED | OUTPATIENT
Start: 2022-10-09 | End: 2022-10-09

## 2022-10-09 RX ORDER — DEXTROSE MONOHYDRATE 25 G/50ML
25 INJECTION, SOLUTION INTRAVENOUS
Status: DISCONTINUED | OUTPATIENT
Start: 2022-10-09 | End: 2022-10-10 | Stop reason: HOSPADM

## 2022-10-09 RX ORDER — INSULIN LISPRO 100 [IU]/ML
2-9 INJECTION, SOLUTION INTRAVENOUS; SUBCUTANEOUS
Status: DISCONTINUED | OUTPATIENT
Start: 2022-10-09 | End: 2022-10-09

## 2022-10-09 RX ADMIN — ENOXAPARIN SODIUM 40 MG: 40 INJECTION SUBCUTANEOUS at 18:40

## 2022-10-09 RX ADMIN — OMEPRAZOLE 20 MG: 20 CAPSULE, DELAYED RELEASE ORAL at 05:28

## 2022-10-09 RX ADMIN — INSULIN LISPRO 3 UNITS: 100 INJECTION, SOLUTION INTRAVENOUS; SUBCUTANEOUS at 18:35

## 2022-10-09 RX ADMIN — MAGNESIUM SULFATE HEPTAHYDRATE 2 G: 40 INJECTION, SOLUTION INTRAVENOUS at 09:03

## 2022-10-09 RX ADMIN — INSULIN GLARGINE-YFGN 13 UNITS: 100 INJECTION, SOLUTION SUBCUTANEOUS at 05:31

## 2022-10-09 RX ADMIN — INSULIN LISPRO 5 UNITS: 100 INJECTION, SOLUTION INTRAVENOUS; SUBCUTANEOUS at 12:59

## 2022-10-09 RX ADMIN — INSULIN LISPRO 3 UNITS: 100 INJECTION, SOLUTION INTRAVENOUS; SUBCUTANEOUS at 21:15

## 2022-10-09 RX ADMIN — INSULIN LISPRO 5 UNITS: 100 INJECTION, SOLUTION INTRAVENOUS; SUBCUTANEOUS at 09:01

## 2022-10-09 ASSESSMENT — ENCOUNTER SYMPTOMS
COUGH: 0
HEADACHES: 0
DOUBLE VISION: 0
PHOTOPHOBIA: 0
DIZZINESS: 0
DIARRHEA: 0
DEPRESSION: 0
ROS GI COMMENTS: NAUSEA RESOLVED
POLYDIPSIA: 0
FEVER: 0
NAUSEA: 0
CONSTIPATION: 0
CHILLS: 0
SORE THROAT: 0
MYALGIAS: 0

## 2022-10-09 ASSESSMENT — PAIN DESCRIPTION - PAIN TYPE
TYPE: ACUTE PAIN
TYPE: ACUTE PAIN

## 2022-10-09 NOTE — PROGRESS NOTES
4 Eyes Skin Assessment Completed by ANANYA Romo and ANANYA Ward.     Head WDL  Ears WDL  Nose WDL  Mouth WDL  Neck WDL  Breast/Chest WDL  Shoulder Blades WDL  Spine WDL  (R) Arm/Elbow/Hand WDL  (L) Arm/Elbow/Hand WDL  Abdomen WDL  Groin WDL  Scrotum/Coccyx/Buttocks WDL  (R) Leg WDL  (L) Leg WDL  (R) Heel/Foot/Toe WDL  (L) Heel/Foot/Toe WDL              Devices In Places N/A        Interventions In Place N/A     Possible Skin Injury No     Pictures Uploaded Into Epic N/A  Wound Consult Placed N/A  RN Wound Prevention Protocol Ordered No

## 2022-10-09 NOTE — ASSESSMENT & PLAN NOTE
- In setting of prior IV drug use  -We will need to follow-up further history and determine if this has been treated  -Right upper quadrant ultrasound from 2020 noted without evidence of cirrhosis  -Outpatient GI referral   Bedside and Verbal shift change report given to Paula Cardoso RN (oncoming nurse) by Alok Miller RN (offgoing nurse). Report included the following information SBAR, Kardex, Intake/Output, MAR, Recent Results, Med Rec Status and Cardiac Rhythm Afib.

## 2022-10-09 NOTE — ASSESSMENT & PLAN NOTE
- In setting of prior IV drug use  -We will need to follow-up further history and determine if this has been treated  -Right upper quadrant ultrasound from 2020 noted without evidence of cirrhosis  -Outpatient GI referral

## 2022-10-09 NOTE — PROGRESS NOTES
Potassium and phosphorous increased from 3.4 to 6.5 and 1.5 to 9.7, respectively. Manuel SANDS placed orders to redraw labs and EKG. EKG WNL. Labs being drawn right now.

## 2022-10-09 NOTE — PROGRESS NOTES
NOC HOSPITALIST CROSS COVER    Notified by RN regarding phosphorus of 1.5.  Ordered potassium phosphate 30 mmol IV piggyback.  A.m. labs at 0123 resulted with potassium of 6.5 and phosphorus of 9.7.  These values were believed to be aberrant, considering the patient's phosphorus was 1.5 and potassium of 3.4 on prior labs.  Placed orders for stat repeat of BMP and mag/Phos.  Potassium noted to be 3.8 and phosphorus noted to be 3.4, indicating that the elevated values on 0123 labs may have been elevated secondary to hemolysis.  A twelve-lead EKG was ordered in the interim while redraw was obtained, which demonstrated sinus rhythm rate 77 with borderline low voltage and nonspecific T wave changes, without ST depressions, QTc 446.      Vitals:    10/09/22 0359   BP: 112/66   Pulse: 72   Resp: 16   Temp: 36.7 °C (98 °F)   SpO2: 97%            -----------------------------------------------------------------------------------------------------------    Electronically signed by:  WICHO Pérez AGACNP-BC  Hospitalist Services

## 2022-10-09 NOTE — CARE PLAN
The patient is Stable - Low risk of patient condition declining or worsening    Shift Goals  Clinical Goals: blood sugar in goal range  Patient Goals: Learning how to check blood sugar levels    Progress made toward(s) clinical / shift goals:  Patient progressing well this shift. A&O x 4. Skin intact. Tolerating diabetic diet. Continuing with room air O2. Moving well with stand by. Glucose ACHS. Discharge plan is home today.       Problem: Diabetes Management  Goal: Patient will achieve and maintain glucose in satisfactory range  Outcome: Progressing  Note: Sugars ranging 160-250 for the past day. Checking BG ACHS. High levels managed with ordered insulin.      Problem: Skin Integrity - Diabetes  Goal: Patient's skin on legs and feet will remain intact while hospitalized  Outcome: Progressing  Note: Skin remains intact this shift. There are no new wounds or skin issues noted overnight.        Problem: Respiratory  Goal: Patient will achieve/maintain optimum respiratory ventilation and gas exchange  Outcome: Progressing  Note: No SOB or dyspnea overnight. Patient breathing well on room air.        Problem: Fluid Balance or Risk for Fluid Volume Deficit  Goal: Patient will demonstrate adequate hydration and vital signs  Outcome: Progressing  Note: Pt maintaining optimal fluid volume balance evidenced by no presence of edema, adequate urine output, adequate fluid intake, and stable BP.

## 2022-10-09 NOTE — CARE PLAN
The patient is Stable - Low risk of patient condition declining or worsening    Shift Goals  Clinical Goals: Monitor blood sugar  Patient Goals: Rest    Progress made toward(s) clinical / shift goals:  Patient resting in bed A&O x4. No complaints per pt.     Patient is not progressing towards the following goals: Patient blood sugars are still in the high 200's consistently.     Problem: Nutrition Deficit - Diabetes  Goal: Patient will demonstrate adequate hydration and vital signs  Outcome: Progressing

## 2022-10-09 NOTE — ASSESSMENT & PLAN NOTE
- Hypokalemia, hypomagnesemia, hypophosphatemia present  -Replete to K greater than 4, mag greater than 2, Phos greater than 2.5

## 2022-10-09 NOTE — ASSESSMENT & PLAN NOTE
- Patient's home ACE inhibitor was held on admission in setting of DKA and potential for hemodynamic instability  -We will resume home lisinopril at reduced dose with holding parameters given patient's BP generally in the systolic 100s to 110s so far this admission

## 2022-10-09 NOTE — PROGRESS NOTES
[facility] Internal Medicine Daily Progress Note    Date of Service  10/9/2022    [facility] Team: [facility] Team   Attending: Shira Leon M.d.  Senior Resident: Dr. Temi Holland  Intern:  Dr. Maycol Mariscal  Contact Number: [phone]    Chief Complaint  DKA      Gerber Langston is a 37 y.o. male admitted 10/7/2022 with past medical history of hypertension, type 1 diabetes, reportedly on extensive insulin regimen at home of 40 units glargine in the a.m. and 20 units glargine in the p.m. with lispro sliding scale insulin, A1c 9.5 on 10/7/2022, who presented with DKA after having been off of his insulin for a week, after having been discharged from residential.  He states he was not given any prescriptions for insulin upon discharge from residential.  Symptoms at initial presentation involved polyuria and polydipsia, and he has been eating extensive amounts of food at home reportedly.    Initial labs were notable for an elevated blood glucose of 475, bicarbonate 14, anion gap 22, and beta hydroxybutyrate of 7.14.  Patient was admitted to the ICU and was started on DKA protocol IV insulin infusion with successful closure of his anion gap and improvement of his bicarb.  He was transitioned to subcutaneous insulin and was tolerating oral intake on 10/8, and subsequently transferred to the floor from the ICU.    Hospital Course  Gerber Langston is a 37 y.o. male admitted 10/7/2022 with past medical history of hypertension, type 1 diabetes, reportedly on extensive insulin regimen at home of 40 units glargine in the a.m. and 20 units glargine in the p.m. with lispro sliding scale insulin, A1c 9.5 on 10/7/2022, who presented with DKA after having been off of his insulin for a week, after having been discharged from residential.  He states he was not given any prescriptions for insulin upon discharge from residential.  Symptoms at initial presentation involved polyuria and polydipsia, and he has been eating extensive amounts of food at home  reportedly.    Initial labs were notable for an elevated blood glucose of 475, bicarbonate 14, anion gap 22, and beta hydroxybutyrate of 7.14.  Patient was admitted to the ICU and was started on DKA protocol IV insulin infusion with successful closure of his anion gap and improvement of his bicarb.  He was transitioned to subcutaneous insulin and was tolerating oral intake on 10/8, and subsequently transferred to the floor from the ICU.    Interval Problem Update  -Doing well today  -Confirmed extensive home insulin regimen listed in the beginning of the hospital course above  -Stated he has been off of all insulin for about a week since he was discharged from long-term  -Doing well today, tolerating oral intake, no nausea, no vomiting  -Received 13 units corrections, blood sugars generally in the high 200s, increasing Lantus today    I have discussed this patient's plan of care and discharge plan at IDT rounds today with Case Management, Nursing, Nursing leadership, and other members of the IDT team.    Consultants/Specialty  critical care    Code Status  Full Code    Disposition  Patient is not medically cleared for discharge.   Anticipate discharge to to home with close outpatient follow-up.  I have placed the appropriate orders for post-discharge needs.    Review of Systems  Review of Systems   Constitutional:  Negative for chills, fever and malaise/fatigue.        Fatigue resolved   HENT:  Negative for congestion and sore throat.    Eyes:  Negative for double vision and photophobia.   Respiratory:  Negative for cough.    Cardiovascular:  Negative for chest pain and leg swelling.   Gastrointestinal:  Negative for constipation, diarrhea and nausea.        Nausea resolved   Genitourinary:  Negative for dysuria and frequency.        Polyuria resolved   Musculoskeletal:  Negative for myalgias.   Neurological:  Negative for dizziness and headaches.   Endo/Heme/Allergies:  Negative for polydipsia.        Polydipsia  resolved   Psychiatric/Behavioral:  Negative for depression and suicidal ideas.       Physical Exam  Temp:  [36.4 °C (97.5 °F)-36.7 °C (98 °F)] 36.6 °C (97.8 °F)  Pulse:  [68-72] 71  Resp:  [15-16] 16  BP: (104-128)/(66-81) 104/73  SpO2:  [94 %-99 %] 94 %    Physical Exam  Constitutional:       General: He is not in acute distress.     Appearance: He is normal weight.   HENT:      Head: Normocephalic and atraumatic.      Right Ear: External ear normal.      Left Ear: External ear normal.      Nose: Nose normal. No congestion.      Mouth/Throat:      Mouth: Mucous membranes are moist.      Pharynx: No oropharyngeal exudate.   Eyes:      General: No scleral icterus.     Extraocular Movements: Extraocular movements intact.      Pupils: Pupils are equal, round, and reactive to light.   Cardiovascular:      Rate and Rhythm: Normal rate and regular rhythm.      Pulses: Normal pulses.      Heart sounds: Normal heart sounds. No murmur heard.  Pulmonary:      Effort: Pulmonary effort is normal. No respiratory distress.      Breath sounds: Normal breath sounds.   Abdominal:      General: There is no distension.      Palpations: Abdomen is soft.   Musculoskeletal:         General: Normal range of motion.      Cervical back: Normal range of motion and neck supple.      Right lower leg: No edema.      Left lower leg: No edema.   Skin:     General: Skin is warm and dry.      Capillary Refill: Capillary refill takes less than 2 seconds.      Coloration: Skin is not pale.   Neurological:      General: No focal deficit present.      Mental Status: He is alert and oriented to person, place, and time.      Cranial Nerves: No cranial nerve deficit.   Psychiatric:         Mood and Affect: Mood normal.         Thought Content: Thought content normal.       Fluids    Intake/Output Summary (Last 24 hours) at 10/9/2022 1518  Last data filed at 10/9/2022 0830  Gross per 24 hour   Intake 600 ml   Output --   Net 600 ml        Laboratory  Recent Labs     10/07/22  1500   WBC 7.9   RBC 4.68*   HEMOGLOBIN 13.7*   HEMATOCRIT 40.1*   MCV 85.7   MCH 29.3   MCHC 34.2   RDW 39.8   PLATELETCT 236   MPV 10.9     Recent Labs     10/08/22  0458 10/09/22  0123 10/09/22  0328   SODIUM 135 138 136   POTASSIUM 3.4* 6.5* 3.8   CHLORIDE 106 106 106   CO2 19* 22 20   GLUCOSE 186* 311* 321*   BUN 7* 6* 5*   CREATININE 0.54 0.47* 0.45*   CALCIUM 7.4* 7.9* 7.8*                   Imaging  No orders to display        Assessment/Plan  Problem Representation:    * DKA, type 1 (HCC)  Assessment & Plan  - DKA protocol  -a1c 9.5 on 10/7/2022  -DKA 2/2 inadequate home insulin use  -Initial gap 22, bicarb 14, BetaHydroxy +,  Ketones +  -Admitted initially to the ICU and treated with DKA protocol insulin and potassium infusion with Phos and other electrolytes monitored and repleted as needed  -Tolerated p.o., gap closed, transitioned off insulin drip on 10/8  -Transferred to the floor from the ICU on 10/8  -Very aggressive home regimen, per patient, reportedly 40u glargine AM; 20u glargine PM w/ lispro SSI for meal coverage  -Titrating insulin requirements, increasing Lantus to 17 units today (10/9) with medium dose sliding scale insulin; addl 4u insulin glargine cover increased dose given; BGs reflect increased dose  -Hypoglycemia protocol  -Will need new insulin prescription on day of discharge  -Diabetic diet  -Diabetic education on day of discharge  -Restart home lisinopril for renal protection, starting at lower than home dose due to low-normal BP  AG and CO2 now normalized  Start glargine/SSI   As outpt had been on 40 units in the am and 20 units in the hs of glargine with TID short acting  Repeat BMP in am to confirm he's not back tracking  Diabetic education    Electrolyte abnormality  Assessment & Plan  - Hypokalemia, hypomagnesemia, hypophosphatemia present  -Replete to K greater than 4, mag greater than 2, Phos greater than  2.5    Hypertension  Assessment & Plan  - Patient's home ACE inhibitor was held on admission in setting of DKA and potential for hemodynamic instability  -We will resume home lisinopril at reduced dose with holding parameters given patient's BP generally in the systolic 100s to 110s so far this admission    Gastroesophageal reflux disease without esophagitis  Assessment & Plan  Plan:  - Omeprazole     Chronic hepatitis C virus infection (HCC)- (present on admission)  Assessment & Plan  - In setting of prior IV drug use  -We will need to follow-up further history and determine if this has been treated  -Right upper quadrant ultrasound from 2020 noted without evidence of cirrhosis  -Outpatient GI referral       VTE prophylaxis: enoxaparin ppx    I have performed a physical exam and reviewed and updated ROS and Plan today (10/9/2022). In review of yesterday's note (10/8/2022), there are no changes except as documented above.

## 2022-10-10 ENCOUNTER — PHARMACY VISIT (OUTPATIENT)
Dept: PHARMACY | Facility: MEDICAL CENTER | Age: 37
End: 2022-10-10
Payer: COMMERCIAL

## 2022-10-10 VITALS
SYSTOLIC BLOOD PRESSURE: 105 MMHG | DIASTOLIC BLOOD PRESSURE: 70 MMHG | HEART RATE: 87 BPM | OXYGEN SATURATION: 95 % | RESPIRATION RATE: 16 BRPM | TEMPERATURE: 98 F | BODY MASS INDEX: 25.22 KG/M2 | WEIGHT: 147.71 LBS | HEIGHT: 64 IN

## 2022-10-10 LAB
ANION GAP SERPL CALC-SCNC: 9 MMOL/L (ref 7–16)
BUN SERPL-MCNC: 7 MG/DL (ref 8–22)
CALCIUM SERPL-MCNC: 8.6 MG/DL (ref 8.5–10.5)
CHLORIDE SERPL-SCNC: 105 MMOL/L (ref 96–112)
CO2 SERPL-SCNC: 25 MMOL/L (ref 20–33)
CREAT SERPL-MCNC: 0.64 MG/DL (ref 0.5–1.4)
GFR SERPLBLD CREATININE-BSD FMLA CKD-EPI: 127 ML/MIN/1.73 M 2
GLUCOSE BLD STRIP.AUTO-MCNC: 210 MG/DL (ref 65–99)
GLUCOSE BLD STRIP.AUTO-MCNC: 232 MG/DL (ref 65–99)
GLUCOSE SERPL-MCNC: 211 MG/DL (ref 65–99)
MAGNESIUM SERPL-MCNC: 2.1 MG/DL (ref 1.5–2.5)
PHOSPHATE SERPL-MCNC: 2.7 MG/DL (ref 2.5–4.5)
POTASSIUM SERPL-SCNC: 3.5 MMOL/L (ref 3.6–5.5)
SODIUM SERPL-SCNC: 139 MMOL/L (ref 135–145)

## 2022-10-10 PROCEDURE — 82962 GLUCOSE BLOOD TEST: CPT | Mod: 91

## 2022-10-10 PROCEDURE — RXMED WILLOW AMBULATORY MEDICATION CHARGE: Performed by: HOSPITALIST

## 2022-10-10 PROCEDURE — 36415 COLL VENOUS BLD VENIPUNCTURE: CPT

## 2022-10-10 PROCEDURE — 80048 BASIC METABOLIC PNL TOTAL CA: CPT

## 2022-10-10 PROCEDURE — 90471 IMMUNIZATION ADMIN: CPT

## 2022-10-10 PROCEDURE — 700102 HCHG RX REV CODE 250 W/ 637 OVERRIDE(OP): Performed by: STUDENT IN AN ORGANIZED HEALTH CARE EDUCATION/TRAINING PROGRAM

## 2022-10-10 PROCEDURE — 700102 HCHG RX REV CODE 250 W/ 637 OVERRIDE(OP): Performed by: HOSPITALIST

## 2022-10-10 PROCEDURE — A9270 NON-COVERED ITEM OR SERVICE: HCPCS | Performed by: HOSPITALIST

## 2022-10-10 PROCEDURE — A9270 NON-COVERED ITEM OR SERVICE: HCPCS | Performed by: INTERNAL MEDICINE

## 2022-10-10 PROCEDURE — 99239 HOSP IP/OBS DSCHRG MGMT >30: CPT | Mod: GC | Performed by: INTERNAL MEDICINE

## 2022-10-10 PROCEDURE — 90686 IIV4 VACC NO PRSV 0.5 ML IM: CPT | Performed by: INTERNAL MEDICINE

## 2022-10-10 PROCEDURE — 700102 HCHG RX REV CODE 250 W/ 637 OVERRIDE(OP): Performed by: INTERNAL MEDICINE

## 2022-10-10 PROCEDURE — 83735 ASSAY OF MAGNESIUM: CPT

## 2022-10-10 PROCEDURE — 84100 ASSAY OF PHOSPHORUS: CPT

## 2022-10-10 PROCEDURE — A9270 NON-COVERED ITEM OR SERVICE: HCPCS | Performed by: STUDENT IN AN ORGANIZED HEALTH CARE EDUCATION/TRAINING PROGRAM

## 2022-10-10 PROCEDURE — 700111 HCHG RX REV CODE 636 W/ 250 OVERRIDE (IP): Performed by: INTERNAL MEDICINE

## 2022-10-10 RX ORDER — INSULIN GLARGINE 100 [IU]/ML
22 INJECTION, SOLUTION SUBCUTANEOUS EVERY MORNING
Qty: 15 ML | Refills: 2 | Status: SHIPPED | OUTPATIENT
Start: 2022-10-11

## 2022-10-10 RX ORDER — LANCETS
EACH MISCELLANEOUS
Qty: 100 EACH | Refills: 0 | Status: SHIPPED | OUTPATIENT
Start: 2022-10-10

## 2022-10-10 RX ORDER — CALCIUM CITRATE/VITAMIN D3 200MG-6.25
TABLET ORAL
Qty: 100 STRIP | Refills: 0 | Status: SHIPPED | OUTPATIENT
Start: 2022-10-10

## 2022-10-10 RX ORDER — INSULIN LISPRO 100 [IU]/ML
9 INJECTION, SOLUTION INTRAVENOUS; SUBCUTANEOUS
Qty: 15 ML | Refills: 2 | Status: SHIPPED | OUTPATIENT
Start: 2022-10-10

## 2022-10-10 RX ORDER — LISINOPRIL 5 MG/1
5 TABLET ORAL DAILY
Qty: 30 TABLET | Refills: 1 | Status: SHIPPED | OUTPATIENT
Start: 2022-10-11

## 2022-10-10 RX ORDER — PEN NEEDLE, DIABETIC 32GX 5/32"
NEEDLE, DISPOSABLE MISCELLANEOUS
Qty: 100 EACH | Refills: 0 | Status: SHIPPED | OUTPATIENT
Start: 2022-10-10

## 2022-10-10 RX ORDER — GLUCOSAMINE HCL/CHONDROITIN SU 500-400 MG
CAPSULE ORAL
Qty: 100 EACH | Refills: 0 | Status: SHIPPED | OUTPATIENT
Start: 2022-10-10

## 2022-10-10 RX ORDER — POTASSIUM CHLORIDE 20 MEQ/1
40 TABLET, EXTENDED RELEASE ORAL ONCE
Status: DISCONTINUED | OUTPATIENT
Start: 2022-10-10 | End: 2022-10-10 | Stop reason: HOSPADM

## 2022-10-10 RX ORDER — NICOTINE POLACRILEX 4 MG/1
20 GUM, CHEWING ORAL DAILY
Qty: 28 TABLET | Refills: 0 | Status: SHIPPED | OUTPATIENT
Start: 2022-10-11

## 2022-10-10 RX ADMIN — SENNOSIDES AND DOCUSATE SODIUM 2 TABLET: 50; 8.6 TABLET ORAL at 06:11

## 2022-10-10 RX ADMIN — INSULIN LISPRO 3 UNITS: 100 INJECTION, SOLUTION INTRAVENOUS; SUBCUTANEOUS at 09:14

## 2022-10-10 RX ADMIN — INFLUENZA A VIRUS A/VICTORIA/2570/2019 IVR-215 (H1N1) ANTIGEN (FORMALDEHYDE INACTIVATED), INFLUENZA A VIRUS A/DARWIN/9/2021 SAN-010 (H3N2) ANTIGEN (FORMALDEHYDE INACTIVATED), INFLUENZA B VIRUS B/PHUKET/3073/2013 ANTIGEN (FORMALDEHYDE INACTIVATED), AND INFLUENZA B VIRUS B/MICHIGAN/01/2021 ANTIGEN (FORMALDEHYDE INACTIVATED) 0.5 ML: 15; 15; 15; 15 INJECTION, SUSPENSION INTRAMUSCULAR at 09:57

## 2022-10-10 RX ADMIN — LISINOPRIL 5 MG: 5 TABLET ORAL at 06:11

## 2022-10-10 RX ADMIN — INSULIN LISPRO 3 UNITS: 100 INJECTION, SOLUTION INTRAVENOUS; SUBCUTANEOUS at 05:56

## 2022-10-10 RX ADMIN — OMEPRAZOLE 20 MG: 20 CAPSULE, DELAYED RELEASE ORAL at 06:11

## 2022-10-10 NOTE — DISCHARGE INSTRUCTIONS
Diabetic Ketoacidosis  Diabetic ketoacidosis is a serious complication of diabetes. This condition develops when there is not enough insulin in the body. Insulin is an hormone that regulates blood sugar levels in the body. Normally, insulin allows glucose to enter the cells in the body. The cells break down glucose for energy. Without enough insulin, the body cannot break down glucose, so it breaks down fats instead. This leads to high blood glucose levels in the body and the production of acids that are called ketones. Ketones are poisonous at high levels.  If diabetic ketoacidosis is not treated, it can cause severe dehydration and can lead to a coma or death.  What are the causes?  This condition develops when a lack of insulin causes the body to break down fats instead of glucose. This may be triggered by:  Stress on the body. This stress is brought on by an illness.  Infection.  Medicines that raise blood glucose levels.  Not taking diabetes medicine.  New onset of type 1 diabetes mellitus.  What are the signs or symptoms?  Symptoms of this condition include:  Fatigue.  Weight loss.  Excessive thirst.  Light-headedness.  Fruity or sweet-smelling breath.  Excessive urination.  Vision changes.  Confusion or irritability.  Nausea.  Vomiting.  Rapid breathing.  Abdominal pain.  Feeling flushed.  How is this diagnosed?  This condition is diagnosed based on your medical history, a physical exam, and blood tests. You may also have a urine test to check for ketones.  How is this treated?  This condition may be treated with:  Fluid replacement. This may be done to correct dehydration.  Insulin injections. These may be given through the skin or through an IV tube.  Electrolyte replacement. Electrolytes are minerals in your blood. Electrolytes such as potassium and sodium may be given in pill form or through an IV tube.  Antibiotic medicines. These may be prescribed if your condition was caused by an infection.  Diabetic  ketoacidosis is a serious medical condition. You may need emergency treatment in the hospital to monitor your condition.  Follow these instructions at home:  Eating and drinking  Drink enough fluids to keep your urine clear or pale yellow.  If you are not able to eat, drink clear fluids in small amounts as you are able. Clear fluids include water, ice chips, fruit juice with water added (diluted), and low-calorie sports drinks. You may also have sugar-free jello or popsicles.  If you are able to eat, follow your usual diet and drink sugar-free liquids, such as water.  Medicines  Take over-the-counter and prescription medicines only as told by your health care provider.  Continue to take insulin and other diabetes medicines as told by your health care provider.  If you were prescribed an antibiotic, take it as told by your health care provider. Do not stop taking the antibiotic even if you start to feel better.  General instructions    Check your urine for ketones when you are ill and as told by your health care provider.  If your blood glucose is 240 mg/dL (13.3 mmol/L) or higher, check your urine ketones every 4-6 hours.  Check your blood glucose every day, as often as told by your health care provider.  If your blood glucose is high, drink plenty of fluids. This helps to flush out ketones.  If your blood glucose is above your target for 2 tests in a row, contact your health care provider.  Carry a medical alert card or wear medical alert jewelry that says that you have diabetes.  Rest and exercise only as told by your health care provider. Do not exercise when your blood glucose is high and you have ketones in your urine.  If you get sick, call your health care provider and begin treatment quickly. Your body often needs extra insulin to fight an illness. Check your blood glucose every 4-6 hours when you are sick.  Keep all follow-up visits as told by your health care provider. This is important.  Contact a health  care provider if:  Your blood glucose level is higher than 240 mg/dL (13.3 mmol/L) for 2 days in a row.  You have moderate or large ketones in your urine.  You have a fever.  You cannot eat or drink without vomiting.  You have been vomiting for more than 2 hours.  You continue to have symptoms of diabetic ketoacidosis.  You develop new symptoms.  Get help right away if:  Your blood glucose monitor reads “high” even when you are taking insulin.  You faint.  You have chest pain.  You have trouble breathing.  You have sudden trouble speaking or swallowing.  You have vomiting or diarrhea that gets worse after 3 hours.  You are unable to stay awake.  You have trouble thinking.  You are severely dehydrated. Symptoms of severe dehydration include:  Extreme thirst.  Dry mouth.  Rapid breathing.  These symptoms may represent a serious problem that is an emergency. Do not wait to see if the symptoms will go away. Get medical help right away. Call your local emergency services (911 in the [place] Do not drive yourself to the hospital.  Summary  Diabetic ketoacidosis is a serious complication of diabetes. This condition develops when there is not enough insulin in the body.  This condition is diagnosed based on your medical history, a physical exam, and blood tests. You may also have a urine test to check for ketones.  Diabetic ketoacidosis is a serious medical condition. You may need emergency treatment in the hospital to monitor your condition.  Contact your health care provider if your blood glucose is higher than 240 mg/dl for 2 days in a row or if you have moderate or large ketones in your urine.  This information is not intended to replace advice given to you by your health care provider. Make sure you discuss any questions you have with your health care provider.  Document Released: 12/15/2001 Document Revised: 02/02/2018 Document Reviewed: 01/22/2018  [employer] Patient Education © 2020 [employer]  Type 1 Diabetes  Mellitus, Diagnosis, Adult    Type 1 diabetes (type 1 diabetes mellitus) is a long-term (chronic) disease. It happens when your pancreas does not make enough of a hormone called insulin. Insulin lets sugars (glucose) go into cells in your body. This gives you energy. If your body does not make enough insulin, sugars cannot get into cells. This causes high blood sugar (hyperglycemia).  Your doctor will set treatment goals for you. Generally, you should have these blood sugar levels:  Before meals (preprandial):  mg/dL (4.4-7.2 mmol/L).  After meals (postprandial): below 180 mg/dL (10 mmol/L).  A1c (hemoglobin A1c) level: less than 7%.  Follow these instructions at home:  Questions to ask your doctor  You may want to ask these questions:  Do I need to meet with a diabetes educator?  Where can I find a support group for people with diabetes?  What equipment will I need to care for myself at home?  What diabetes medicines do I need? When should I take them?  How often do I need to check my blood sugar?  What number can I call if I have questions?  When is my next doctor's visit?  General instructions  Take over-the-counter and prescription medicines only as told by your doctor.  Keep all follow-up visits as told by your doctor. This is important.  Contact a doctor if:  Your blood sugar is at or above 240 mg/dL (13.3 mmol/L) for 2 days in a row.  You have been sick for 2 days or more, and you are not getting better.  You have had a fever for 2 days or more, and you are not getting better.  You have any of these problems for more than 6 hours:  You cannot eat or drink.  You feel sick to your stomach (nauseous).  You throw up (vomit).  You have watery poop (diarrhea).  Get help right away if:  Your blood sugar is lower than 54 mg/dL (3 mmol/L).  You get confused.  You have trouble:  Thinking clearly.  Breathing.  You have moderate or large ketone levels in your pee (urine).  Summary  Type 1 diabetes (type 1 diabetes  mellitus) is a long-term disease. It happens when your pancreas does not make enough of a hormone called insulin.  Your doctor will set treatment goals for you.  Take over-the-counter and prescription medicines only as told by your doctor.  Keep all follow-up visits as told by your doctor. This is important.  This information is not intended to replace advice given to you by your health care provider. Make sure you discuss any questions you have with your health care provider.  Document Released: 04/10/2017 Document Revised: 11/30/2018 Document Reviewed: 01/20/2017  [employer] Patient Education © 2020 [employer]  Lisinopril oral solution  What is this medicine?  LISINOPRIL (lyse IN oh pril) is an ACE inhibitor. This medicine is used to treat high blood pressure and heart failure. It is also used to protect the heart immediately after a heart attack.  This medicine may be used for other purposes; ask your health care provider or pharmacist if you have questions.  COMMON BRAND NAME(S): GLENS  What should I tell my health care provider before I take this medicine?  They need to know if you have any of these conditions:  diabetes  heart or blood vessel disease  kidney disease  low blood pressure  previous swelling of the tongue, face, or lips with difficulty breathing, difficulty swallowing, hoarseness, or tightening of the throat  an unusual or allergic reaction to lisinopril, other ACE inhibitors, insect venom, food, dyes, or preservatives  pregnant or trying to get pregnant  breast-feeding  How should I use this medicine?  Take this medicine by mouth. Follow the directions on the prescription label. Use a specially marked spoon or container to measure each dose. Ask your pharmacist if you do not have one. Household spoons are not accurate. You may take this medicine with or without food. If it upsets your stomach, take it with food. Take your medicine at regular intervals. Do not take it more often than directed.  Do not stop taking except on your doctor's advice.  Talk to your pediatrician regarding the use of this medicine in children. While this drug may be prescribed for children as young as 6 years for selected conditions, precautions do apply.  Overdosage: If you think you have taken too much of this medicine contact a poison control center or emergency room at once.  NOTE: This medicine is only for you. Do not share this medicine with others.  What if I miss a dose?  If you miss a dose, take it as soon as you can. If it is almost time for your next dose, take only that dose. Do not take double or extra doses.  What may interact with this medicine?  Do not take this medicine with any of the following medications:  hymenoptera venom  sacubitril; valsartan  This medicines may also interact with the following medications:  aliskiren  angiotensin receptor blockers, like losartan or valsartan  certain medicines for diabetes  diuretics  everolimus  gold compounds  lithium  NSAIDs, medicines for pain and inflammation, like ibuprofen or naproxen  potassium salts or supplements  salt substitutes  sirolimus  temsirolimus  This list may not describe all possible interactions. Give your health care provider a list of all the medicines, herbs, non-prescription drugs, or dietary supplements you use. Also tell them if you smoke, drink alcohol, or use illegal drugs. Some items may interact with your medicine.  What should I watch for while using this medicine?  Visit your doctor or health care professional for regular check ups. Check your blood pressure as directed. Ask your doctor what your blood pressure should be, and when you should contact him or her.  Do not treat yourself for coughs, colds, or pain while you are using this medicine without asking your doctor or health care professional for advice. Some ingredients may increase your blood pressure.  Women should inform their doctor if they wish to become pregnant or think they  might be pregnant. There is a potential for serious side effects to an unborn child. Talk to your health care professional or pharmacist for more information.  Check with your doctor or health care professional if you get an attack of severe diarrhea, nausea and vomiting, or if you sweat a lot. The loss of too much body fluid can make it dangerous for you to take this medicine.  You may get drowsy or dizzy. Do not drive, use machinery, or do anything that needs mental alertness until you know how this drug affects you. Do not stand or sit up quickly, especially if you are an older patient. This reduces the risk of dizzy or fainting spells. Alcohol can make you more drowsy and dizzy. Avoid alcoholic drinks.  Avoid salt substitutes unless you are told otherwise by your doctor or health care professional.  What side effects may I notice from receiving this medicine?  Side effects that you should report to your doctor or health care professional as soon as possible:  allergic reactions like skin rash, itching or hives, swelling of the hands, feet, face, lips, throat, or tongue  breathing problems  signs and symptoms of kidney injury like trouble passing urine or change in the amount of urine  signs and symptoms of increased potassium like muscle weakness; chest pain; or fast, irregular heartbeat  signs and symptoms of liver injury like dark yellow or brown urine; general ill feeling or flu-like symptoms; light-colored stools; loss of appetite; nausea; right upper belly pain; unusually weak or tired; yellowing of the eyes or skin  signs and symptoms of low blood pressure like dizziness; feeling faint or lightheaded, falls; unusually weak or tired  stomach pain with or without nausea and vomiting  Side effects that usually do not require medical attention (report to your doctor or health care professional if they continue or are bothersome):  changes in taste  cough  dizziness  fever  headache  sensitivity to light  This  list may not describe all possible side effects. Call your doctor for medical advice about side effects. You may report side effects to FDA at 7-325-ATB-7263.  Where should I keep my medicine?  Keep out of the reach of children.  Store at room temperature between 20 and 25 degrees C (68 to 77 degrees F). Thrown away any unused medicine after the expiration date.  NOTE: This sheet is a summary. It may not cover all possible information. If you have questions about this medicine, talk to your doctor, pharmacist, or health care provider.  © 2020 [employer] (2017-02-10 11:41:14)

## 2022-10-10 NOTE — CARE PLAN
The patient is Stable - Low risk of patient condition declining or worsening    Shift Goals: Monitor blood sugar checks; education on diabetes management  Clinical Goals: Monitor BSG  Patient Goals: Rest and comfort and discharge planning  Family Goals: None at bedside    Progress made toward(s) clinical / shift goals:  see above     Problem: Knowledge Deficit - Standard  Goal: Patient and family/care givers will demonstrate understanding of plan of care, disease process/condition, diagnostic tests and medications  Outcome: Progressing     Problem: Diabetes Management  Goal: Patient will achieve and maintain glucose in satisfactory range  Outcome: Progressing     Problem: Knowledge Deficit - Diabetes  Goal: Patient will demonstrate knowledge of insulin injection, symptoms, and treatment of hypoglycemia and diet prior to discharge  Outcome: Progressing     Problem: Discharge Planning - Diabetes  Goal: Patient's continuum of care needs will be met  Outcome: Progressing     Problem: Skin Integrity - Diabetes  Goal: Patient's skin on legs and feet will remain intact while hospitalized  Outcome: Progressing     Problem: Infection - Diabetes  Goal: Patient will remain free from signs and symptoms of infection  Outcome: Progressing     Problem: Respiratory  Goal: Patient will achieve/maintain optimum respiratory ventilation and gas exchange  Outcome: Progressing         Patient is not progressing towards the following goals:

## 2022-10-10 NOTE — DISCHARGE SUMMARY
"Internal Medicine Discharge Summary     Date of Admission: 10/7/22  Date of Discharge: 10/10/22  Service: ICU, [facility]  Attending Physician: Edward   Senior Resident: Remi Ardon  Intern: James Schroeder Diagnoses:   Diabetic ketoacidosis (DKA) in setting of long standing type 1 diabetes mellitus, due to insulin deficiency     History of Present Illness:   37M with known type 1 diabetes admitted asking for a medication refill as he did not have access to his insulin in setting of recent release from residential and no immediate primary care access. See admission H&P for details.     Hospital Course:   Patient had recently been released from residential and didn't have access to his insulin, so he had an insulin deficiency resulting in DKA which was present on admission. On admit, his bicarb was 14, anion gap 22, glucose 475, and beta hydroxybutyrate 7.14. Patient was admitted to the ICU and started on an insulin drip and IV fluids. His gap closed and sugars decreased and was transitioned to sub-Q insulin. He remained stable until day of discharge. Patient has a history of symptomatic hypoglycemia, so we were cautious about his insulin dosing. He was discharge on the insulin regimen below, and reported that he had an appointment with his PCP on the Thursday following discharge, during which I anticipate that meds will be titrated. Patient given Rx for meds and a meter with strips.     Physical Exam on Day of Discharge:   /70   Pulse 87   Temp 36.7 °C (98 °F) (Temporal)   Resp 16   Ht 1.626 m (5' 4\")   Wt 67 kg (147 lb 11.3 oz)   SpO2 95%   Gen: walking around room, pleasant and in no acute distress   HEENT: EOMI, mmm   CV: RRR, no m/r/g heard   Resp: even and unlabored   Abd: non distended   Ext: no edema  Skin : no rashes or lesions   Psych: calm, cooperative and pleasant     Condition at Discharge:   Stable     Disposition:   To home    Discharge Medications:      Medication List        START taking these " medications        Instructions   Alcohol Swabs Pads   Doctor's comments: Per formulary preference. ICD-10 code: E10.65 - Uncontrolled type 1 Diabetes Mellitus  Wipe site with prep pad prior to injection.     BD Pen Needle Sally U/F  Generic drug: Insulin Pen Needle 32 G x 4 mm   Doctor's comments: Per formulary preference. ICD-10 code: E10.65 - Uncontrolled type 1 Diabetes Mellitus  Use for injections 3 times daily     insulin lispro 100 UNIT/ML Sopn injection PEN  Commonly known as: HumaLOG,AdmeLOG   Inject 9 Units under the skin 3 times a day before meals. For glucose: 70-150mg/dL=0Units; 151-200mg/dL=2Units ;201-250mg/dL=3Units; 251-300mg/dL=5Units; 301-350mg/dL=6Units; 351-400mg/dL=8Units  >400 mg/dL=9Units; >400 x2 consecutive=9Units & call MD  if FSBG<or= to 70 mg/dL, give glucose & call MD  Dose: 9 Units     Lantus SoloStar 100 UNIT/ML Sopn injection  Start taking on: October 11, 2022  Generic drug: insulin glargine   Inject 22 Units under the skin every morning.  Dose: 22 Units     omeprazole 20 MG Tbec delayed-release tablet  Start taking on: October 11, 2022  Commonly known as: PRILOSEC   Take 1 Tablet by mouth every day.  Dose: 20 mg     TechLite Lancets Misc   Doctor's comments: Or per formulary preference. ICD-10 code: E10.65 - Uncontrolled type 1 Diabetes Mellitus  Use to test 3 times daily     True Metrix Blood Glucose Test strip  Generic drug: glucose blood   Doctor's comments: Or per formulary preference. ICD-10 code: E10.65 - Uncontrolled type 1 Diabetes Mellitus  Use to test 3 times daily as directd     True Metrix Meter w/Device Kit   Doctor's comments: Or per formulary preference. ICD-10 code: E10.65 - Uncontrolled type 1 Diabetes Mellitus  Test as directed            CHANGE how you take these medications        Instructions   lisinopril 5 MG Tabs  Start taking on: October 11, 2022  What changed:   medication strength  how much to take  Commonly known as: PRINIVIL   Take 1 Tablet by mouth every  day.  Dose: 5 mg            STOP taking these medications      insulin regular human  Commonly known as: HUMULIN/NOVOLIN R     NON SPECIFIED                Instructions:   Take insulin and meds and prescribed, follow carb conscious diabetic diet.      Follow-up:   With PCP on Thursday 10/13 as scheduled.     Time Spent on Discharge: 45 minutes

## 2022-10-10 NOTE — PROGRESS NOTES
0800: Went to see patient this morning and patient was alert and orientated x4 with no pain or issues. Patient's blood glucose was 232 and insulin was given given with meal.     0950: Medical [facility]  team came by to see patient and said that patient was okay to discharge to home and they wrote discharge and updated prescriptions.    1015: Went order discharge paperwork for patient and took out IV and patient consented to have a flu vaccine. Patient received shot and we were just waiting for meds to be ready for pickup at this time.     1110: All of the patient's needs were brought to bedside at this time and patient was walked down to the entrance with belongings, meds and discharge paperwork.

## 2022-10-10 NOTE — DISCHARGE PLANNING
CHW Marta and SARAHW Epifanio attempted to visit pt bedside. RN team was in room to begin dialysis treatment. CHW will attempt at a later date.

## 2022-10-10 NOTE — CARE PLAN
The patient is Stable - Low risk of patient condition declining or worsening    Shift Goals  Clinical Goals: Monitor BSG  Patient Goals: Rest and comfort  Family Goals: None at bedside    Progress made toward(s) clinical / shift goals:        Problem: Knowledge Deficit - Standard  Goal: Patient and family/care givers will demonstrate understanding of plan of care, disease process/condition, diagnostic tests and medications  Outcome: Progressing     Problem: Diabetes Management  Goal: Patient will achieve and maintain glucose in satisfactory range  Outcome: Progressing     Problem: Skin Integrity - Diabetes  Goal: Patient's skin on legs and feet will remain intact while hospitalized  Outcome: Progressing     Problem: Fluid Balance or Risk for Fluid Volume Deficit  Goal: Patient will demonstrate adequate hydration and vital signs  Outcome: Progressing     Problem: Diabetic Ulcer  Goal: Early identification of diabetic foot wound and initiation of appropriate interventions  Outcome: Progressing       Patient is not progressing towards the following goals:

## 2022-10-12 ENCOUNTER — PATIENT OUTREACH (OUTPATIENT)
Dept: HEALTH INFORMATION MANAGEMENT | Facility: OTHER | Age: 37
End: 2022-10-12
Payer: MEDICAID

## 2022-10-12 NOTE — PROGRESS NOTES
CHW Epifanio called patient via TC post hospital discharge and introduced Community Care Management and  completed SDOH. Patient states he is doing well and denies any barriers with food , housing , transportation and PCP. Patient treats at [facility] and receives SNAP benefits.   Patient relies on mom for all transportation needs.   Community Health Worker Intake  Social determinates of health intake Completed.   Identified barriers to None.  Contact information provided to Gerber OTONIEL Langston Yes   Has PCP appointment scheduled for Pt will schedule  Outpatient assessment completed.    Plan:  LATRELL Godfrey mailed CHW contact information and [facility]. CHW Epifanio will discharge patient form CCM and remove from case load as patient has ne needs.

## 2023-08-25 ENCOUNTER — APPOINTMENT (OUTPATIENT)
Dept: RADIOLOGY | Facility: MEDICAL CENTER | Age: 38
End: 2023-08-25
Attending: STUDENT IN AN ORGANIZED HEALTH CARE EDUCATION/TRAINING PROGRAM
Payer: MEDICAID

## 2023-08-25 ENCOUNTER — HOSPITAL ENCOUNTER (EMERGENCY)
Facility: MEDICAL CENTER | Age: 38
End: 2023-08-25
Attending: STUDENT IN AN ORGANIZED HEALTH CARE EDUCATION/TRAINING PROGRAM
Payer: MEDICAID

## 2023-08-25 VITALS
SYSTOLIC BLOOD PRESSURE: 114 MMHG | HEART RATE: 96 BPM | OXYGEN SATURATION: 97 % | HEIGHT: 64 IN | BODY MASS INDEX: 25.61 KG/M2 | RESPIRATION RATE: 14 BRPM | WEIGHT: 150 LBS | TEMPERATURE: 96.8 F | DIASTOLIC BLOOD PRESSURE: 56 MMHG

## 2023-08-25 LAB
ALBUMIN SERPL BCP-MCNC: 4.6 G/DL (ref 3.2–4.9)
ALBUMIN/GLOB SERPL: 1.5 G/DL
ALP SERPL-CCNC: 120 U/L (ref 30–99)
ALT SERPL-CCNC: 16 U/L (ref 2–50)
ANION GAP SERPL CALC-SCNC: 17 MMOL/L (ref 7–16)
AST SERPL-CCNC: 39 U/L (ref 12–45)
BASE EXCESS BLDV CALC-SCNC: -4 MMOL/L
BASOPHILS # BLD AUTO: 0.6 % (ref 0–1.8)
BASOPHILS # BLD: 0.05 K/UL (ref 0–0.12)
BILIRUB SERPL-MCNC: 0.5 MG/DL (ref 0.1–1.5)
BODY TEMPERATURE: 36 CENTIGRADE
BUN SERPL-MCNC: 6 MG/DL (ref 8–22)
CALCIUM ALBUM COR SERPL-MCNC: 8.5 MG/DL (ref 8.5–10.5)
CALCIUM SERPL-MCNC: 9 MG/DL (ref 8.5–10.5)
CHLORIDE SERPL-SCNC: 100 MMOL/L (ref 96–112)
CO2 SERPL-SCNC: 18 MMOL/L (ref 20–33)
CREAT SERPL-MCNC: 0.57 MG/DL (ref 0.5–1.4)
EOSINOPHIL # BLD AUTO: 0.1 K/UL (ref 0–0.51)
EOSINOPHIL NFR BLD: 1.2 % (ref 0–6.9)
ERYTHROCYTE [DISTWIDTH] IN BLOOD BY AUTOMATED COUNT: 44.3 FL (ref 35.9–50)
ETHANOL BLD-MCNC: 101 MG/DL
GFR SERPLBLD CREATININE-BSD FMLA CKD-EPI: 130 ML/MIN/1.73 M 2
GLOBULIN SER CALC-MCNC: 3 G/DL (ref 1.9–3.5)
GLUCOSE BLD STRIP.AUTO-MCNC: 376 MG/DL (ref 65–99)
GLUCOSE SERPL-MCNC: 382 MG/DL (ref 65–99)
HCO3 BLDV-SCNC: 23 MMOL/L (ref 24–28)
HCT VFR BLD AUTO: 40.7 % (ref 42–52)
HGB BLD-MCNC: 13.7 G/DL (ref 14–18)
IMM GRANULOCYTES # BLD AUTO: 0.01 K/UL (ref 0–0.11)
IMM GRANULOCYTES NFR BLD AUTO: 0.1 % (ref 0–0.9)
LYMPHOCYTES # BLD AUTO: 1.74 K/UL (ref 1–4.8)
LYMPHOCYTES NFR BLD: 20.5 % (ref 22–41)
MCH RBC QN AUTO: 30.7 PG (ref 27–33)
MCHC RBC AUTO-ENTMCNC: 33.7 G/DL (ref 32.3–36.5)
MCV RBC AUTO: 91.3 FL (ref 81.4–97.8)
MONOCYTES # BLD AUTO: 0.44 K/UL (ref 0–0.85)
MONOCYTES NFR BLD AUTO: 5.2 % (ref 0–13.4)
NEUTROPHILS # BLD AUTO: 6.14 K/UL (ref 1.82–7.42)
NEUTROPHILS NFR BLD: 72.4 % (ref 44–72)
NRBC # BLD AUTO: 0 K/UL
NRBC BLD-RTO: 0 /100 WBC (ref 0–0.2)
PCO2 BLDV: 52.1 MMHG (ref 41–51)
PCO2 TEMP ADJ BLDV: 49.9 MMHG (ref 41–51)
PH BLDV: 7.27 [PH] (ref 7.31–7.45)
PH TEMP ADJ BLDV: 7.28 [PH] (ref 7.31–7.45)
PLATELET # BLD AUTO: 291 K/UL (ref 164–446)
PMV BLD AUTO: 11 FL (ref 9–12.9)
PO2 BLDV: 26.7 MMHG (ref 25–40)
PO2 TEMP ADJ BLDV: 24.9 MMHG (ref 25–40)
POTASSIUM SERPL-SCNC: 3.9 MMOL/L (ref 3.6–5.5)
PROT SERPL-MCNC: 7.6 G/DL (ref 6–8.2)
RBC # BLD AUTO: 4.46 M/UL (ref 4.7–6.1)
SAO2 % BLDV: 42.6 %
SODIUM SERPL-SCNC: 135 MMOL/L (ref 135–145)
WBC # BLD AUTO: 8.5 K/UL (ref 4.8–10.8)

## 2023-08-25 PROCEDURE — 71045 X-RAY EXAM CHEST 1 VIEW: CPT

## 2023-08-25 PROCEDURE — 82962 GLUCOSE BLOOD TEST: CPT

## 2023-08-25 PROCEDURE — 82803 BLOOD GASES ANY COMBINATION: CPT

## 2023-08-25 PROCEDURE — A9270 NON-COVERED ITEM OR SERVICE: HCPCS | Mod: UD | Performed by: STUDENT IN AN ORGANIZED HEALTH CARE EDUCATION/TRAINING PROGRAM

## 2023-08-25 PROCEDURE — 70450 CT HEAD/BRAIN W/O DYE: CPT

## 2023-08-25 PROCEDURE — 36415 COLL VENOUS BLD VENIPUNCTURE: CPT

## 2023-08-25 PROCEDURE — 700102 HCHG RX REV CODE 250 W/ 637 OVERRIDE(OP): Mod: UD | Performed by: STUDENT IN AN ORGANIZED HEALTH CARE EDUCATION/TRAINING PROGRAM

## 2023-08-25 PROCEDURE — 99284 EMERGENCY DEPT VISIT MOD MDM: CPT

## 2023-08-25 PROCEDURE — 82077 ASSAY SPEC XCP UR&BREATH IA: CPT

## 2023-08-25 PROCEDURE — 85025 COMPLETE CBC W/AUTO DIFF WBC: CPT

## 2023-08-25 PROCEDURE — 700105 HCHG RX REV CODE 258: Mod: UD | Performed by: STUDENT IN AN ORGANIZED HEALTH CARE EDUCATION/TRAINING PROGRAM

## 2023-08-25 PROCEDURE — 80053 COMPREHEN METABOLIC PANEL: CPT

## 2023-08-25 RX ORDER — SODIUM CHLORIDE, SODIUM LACTATE, POTASSIUM CHLORIDE, CALCIUM CHLORIDE 600; 310; 30; 20 MG/100ML; MG/100ML; MG/100ML; MG/100ML
1000 INJECTION, SOLUTION INTRAVENOUS ONCE
Status: COMPLETED | OUTPATIENT
Start: 2023-08-25 | End: 2023-08-25

## 2023-08-25 RX ORDER — CETIRIZINE HYDROCHLORIDE 10 MG/1
10 TABLET ORAL ONCE
Status: COMPLETED | OUTPATIENT
Start: 2023-08-25 | End: 2023-08-25

## 2023-08-25 RX ORDER — DEXTROSE MONOHYDRATE 25 G/50ML
25 INJECTION, SOLUTION INTRAVENOUS
Status: DISCONTINUED | OUTPATIENT
Start: 2023-08-25 | End: 2023-08-25 | Stop reason: HOSPADM

## 2023-08-25 RX ADMIN — CETIRIZINE HYDROCHLORIDE 10 MG: 10 TABLET, FILM COATED ORAL at 02:05

## 2023-08-25 RX ADMIN — SODIUM CHLORIDE, POTASSIUM CHLORIDE, SODIUM LACTATE AND CALCIUM CHLORIDE 1000 ML: 600; 310; 30; 20 INJECTION, SOLUTION INTRAVENOUS at 02:46

## 2023-08-25 RX ADMIN — SODIUM CHLORIDE, POTASSIUM CHLORIDE, SODIUM LACTATE AND CALCIUM CHLORIDE 1000 ML: 600; 310; 30; 20 INJECTION, SOLUTION INTRAVENOUS at 01:20

## 2023-08-25 ASSESSMENT — FIBROSIS 4 INDEX: FIB4 SCORE: 0.6

## 2023-08-25 NOTE — ED PROVIDER NOTES
ED Provider Note    CHIEF COMPLAINT  Chief Complaint   Patient presents with    Unresponsive     Pt found to be GCS 3 by EMS. EMS placed NPA and pt awoke to GCS 15. Pt reports use of opiates this evening. BG found to be 489 by EMS. Pt presents lethargic but cooperative.       EXTERNAL RECORDS REVIEWED  Inpatient Notes patient mated to the ICU in October 2022 for DKA    HPI/ROS  LIMITATION TO HISTORY   Select: : None  OUTSIDE HISTORIAN(S):  Friend family friend and neighbor    Gerber Langston is a 37 y.o. male who presents after he was found unresponsive.  The patient states that around 11 PM he took 30 mg of street oxycodone because he was having bilateral foot pain which she has had for several months.  He also drink some beers and 3 shots yesterday as well.  His mom found him down in the shower therefore called family friend who came over.  They then called EMS.  EMS placed an NPA in his nose and the patient woke up.  He was not given any Narcan.  The patient has no headache, neck pain, chest pain, shortness of breath, abdominal pain, nausea, vomiting, diarrhea.  He has not taken his insulin since yesterday.  He states that he is drinks daily but just recently started using opiates again but does not state when he started using them again.  He denies any other drug use.  He states that this was intentional and has no suicidal ideation, homicidal ideation or hallucinations.    PAST MEDICAL HISTORY   has a past medical history of Diabetes (HCC), Hep C w/ coma, chronic (01/01/2003), and Hepatitis C.    SURGICAL HISTORY   has a past surgical history that includes hernia repair (Left, 4/2016).    FAMILY HISTORY  History reviewed. No pertinent family history.    SOCIAL HISTORY  Social History     Tobacco Use    Smoking status: Every Day     Current packs/day: 0.25     Types: Cigarettes    Smokeless tobacco: Never   Vaping Use    Vaping Use: Never used   Substance and Sexual Activity    Alcohol use: Yes     Alcohol/week:  "1.8 oz     Types: 3 Cans of beer per week     Comment: socially    Drug use: Yes     Comment: current opiate, hx of meth use, denies use currently    Sexual activity: Not Currently       CURRENT MEDICATIONS  Home Medications       Reviewed by Cristel Avila R.N. (Registered Nurse) on 08/25/23 at 0051  Med List Status: Partial     Medication Last Dose Status   Alcohol Swabs  Active   Blood Glucose Monitoring Suppl (TRUE METRIX METER) w/Device Kit  Active   glucose blood (TRUE METRIX BLOOD GLUCOSE TEST) strip  Active   insulin glargine (LANTUS SOLOSTAR) 100 UNIT/ML Solution Pen-injector injection  Active   insulin lispro (HUMALOG,ADMELOG) 100 UNIT/ML Solution Pen-injector injection PEN  Active   Insulin Pen Needle 32 G x 4 mm (BD PEN NEEDLE MAGDALENA U/F)  Active   lisinopril (PRINIVIL) 5 MG Tab  Active   omeprazole (PRILOSEC) 20 MG Tablet Delayed Response delayed-release tablet  Active   TechLite Lancets Misc  Active                    ALLERGIES  No Known Allergies    PHYSICAL EXAM  VITAL SIGNS: Temp 36 °C (96.8 °F) (Tympanic)   Ht 1.626 m (5' 4\")   Wt 68 kg (150 lb)   BMI 25.75 kg/m²      Constitutional: Well developed, Well nourished, No acute distress, Non-toxic appearance.   HEENT: Normocephalic, Atraumatic,  external ears normal, pharynx pink,  Mucous  Membranes moist, No rhinorrhea or mucosal edema  Eyes: PERRL, EOMI, Conjunctiva normal, No discharge.   Neck: Normal range of motion, No tenderness, Supple, No stridor.   Lymphatic: No lymphadenopathy    Cardiovascular: Regular Rate and Rhythm, No murmurs,  rubs, or gallops.   Thorax & Lungs: Lungs clear to auscultation bilaterally, No respiratory distress, No wheezes, rhales or rhonchi, No chest wall tenderness.   Abdomen: Bowel sounds normal, Soft, non tender, non distended,  No pulsatile masses., no rebound guarding or peritoneal signs.   Skin: Warm, Dry, No erythema, No rash,   Back:  No CVA tenderness,  No spinal tenderness, bony crepitance step offs or " instability.   Extremities: Equal, intact distal pulses, No cyanosis, clubbing or edema,  No tenderness.   Musculoskeletal: Good range of motion in all major joints. No tenderness to palpation or major deformities noted.   Neurologic: Alert & oriented x 3, Cranial nerves II-XII intact, Equal strength and sensation upper and lower extremities bilaterally,  No focal deficits noted.   Psychiatric: Affect normal, Judgment normal, Mood normal. No suicidal or homicidal ideation    DIAGNOSTIC STUDIES / PROCEDURES      LABS  Results for orders placed or performed during the hospital encounter of 08/25/23   CBC with Differential   Result Value Ref Range    WBC 8.5 4.8 - 10.8 K/uL    RBC 4.46 (L) 4.70 - 6.10 M/uL    Hemoglobin 13.7 (L) 14.0 - 18.0 g/dL    Hematocrit 40.7 (L) 42.0 - 52.0 %    MCV 91.3 81.4 - 97.8 fL    MCH 30.7 27.0 - 33.0 pg    MCHC 33.7 32.3 - 36.5 g/dL    RDW 44.3 35.9 - 50.0 fL    Platelet Count 291 164 - 446 K/uL    MPV 11.0 9.0 - 12.9 fL    Neutrophils-Polys 72.40 (H) 44.00 - 72.00 %    Lymphocytes 20.50 (L) 22.00 - 41.00 %    Monocytes 5.20 0.00 - 13.40 %    Eosinophils 1.20 0.00 - 6.90 %    Basophils 0.60 0.00 - 1.80 %    Immature Granulocytes 0.10 0.00 - 0.90 %    Nucleated RBC 0.00 0.00 - 0.20 /100 WBC    Neutrophils (Absolute) 6.14 1.82 - 7.42 K/uL    Lymphs (Absolute) 1.74 1.00 - 4.80 K/uL    Monos (Absolute) 0.44 0.00 - 0.85 K/uL    Eos (Absolute) 0.10 0.00 - 0.51 K/uL    Baso (Absolute) 0.05 0.00 - 0.12 K/uL    Immature Granulocytes (abs) 0.01 0.00 - 0.11 K/uL    NRBC (Absolute) 0.00 K/uL   Comp Metabolic Panel   Result Value Ref Range    Sodium 135 135 - 145 mmol/L    Potassium 3.9 3.6 - 5.5 mmol/L    Chloride 100 96 - 112 mmol/L    Co2 18 (L) 20 - 33 mmol/L    Anion Gap 17.0 (H) 7.0 - 16.0    Glucose 382 (H) 65 - 99 mg/dL    Bun 6 (L) 8 - 22 mg/dL    Creatinine 0.57 0.50 - 1.40 mg/dL    Calcium 9.0 8.5 - 10.5 mg/dL    Correct Calcium 8.5 8.5 - 10.5 mg/dL    AST(SGOT) 39 12 - 45 U/L    ALT(SGPT)  16 2 - 50 U/L    Alkaline Phosphatase 120 (H) 30 - 99 U/L    Total Bilirubin 0.5 0.1 - 1.5 mg/dL    Albumin 4.6 3.2 - 4.9 g/dL    Total Protein 7.6 6.0 - 8.2 g/dL    Globulin 3.0 1.9 - 3.5 g/dL    A-G Ratio 1.5 g/dL   Diagnostic Alcohol   Result Value Ref Range    Diagnostic Alcohol 101.0 (H) <10.1 mg/dL   ESTIMATED GFR   Result Value Ref Range    GFR (CKD-EPI) 130 >60 mL/min/1.73 m 2   VENOUS BLOOD GAS   Result Value Ref Range    Venous Bg Ph 7.27 (L) 7.31 - 7.45    Venous Bg Ph Temp Corrected 7.28 (L) 7.31 - 7.45    Venous Bg Pco2 52.1 (H) 41.0 - 51.0 mmHg    Venous Bg Pco2 Temp Corrected 49.9 41.0 - 51.0 mmHg    Venous Bg Po2 26.7 25.0 - 40.0 mmHg    Venous Bg Po2 Temp Corrected 24.9 (L) 25.0 - 40.0 mmHg    Venous Bg O2 Saturation 42.6 %    Venous Bg Hco3 23 (L) 24 - 28 mmol/L    Venous Bg Base Excess -4 mmol/L    Body Temp 36.0 Centigrade   POCT glucose device results   Result Value Ref Range    POC Glucose, Blood 376 (H) 65 - 99 mg/dL         RADIOLOGY  I have independently interpreted the diagnostic imaging associated with this visit and am waiting the final reading from the radiologist.   My preliminary interpretation is as follows: no ICH  Radiologist interpretation:   CT-HEAD W/O   Final Result         1.  No acute intracranial abnormality.         DX-CHEST-PORTABLE (1 VIEW)   Final Result         1.  No acute cardiopulmonary disease.            COURSE & MEDICAL DECISION MAKING    ED Observation Status? Yes; I am placing the patient in to an observation status due to a diagnostic uncertainty as well as therapeutic intensity. Patient placed in observation status at 12:54 AM, 8/25/2023.     Observation plan is as follows: Labs, imaging, monitoring, IV fluid administration    2:09 AM patient complains of itching therefore he will be given Zyrtec.    Upon Reevaluation, the patient has eloped    Patient discharged from ED Observation status at 3:14 (Time) 08/25/2023 (Date).     INITIAL ASSESSMENT, COURSE AND  PLAN  Care Narrative: This is a 37-year-old male with history of diabetes with noncompliance with insulin who is presenting after he was found down in his shower.  He reports that he took street oxycodone and was drinking alcohol tonight.  EMS was called and they placed NPA in his nose and he woke up therefore no Narcan was given.    On arrival his ABCs are intact.  He is GCS 15 and his neurologic exam is normal.  He has no signs of trauma on his head but CT head was obtained and there is no acute intracranial process.  Chest x-ray shows no evidence of traumatic injury or pneumonia.    Labs without leukocytosis.  Hemoglobin at 13.7 which is identical to 10 months prior.  He has a history of diabetes and his blood glucose is 376 and states he has not taken his insulin for the past day.  On chemistry panel he does have elevated blood glucose to 382 and mildly low bicarb to 18 and anion gap of 17.  He was given an IV fluid bolus.  Alcohol 101.  Urine drug screen ordered but patient did not provide urine sample.  Patient did not need any Narcan while he was in the emergency room.    3:10 I was notified by the nursing staff that the patient had removed his IV and was walking out of the emergency room.  I was unable to discuss this patient further before he eloped from the emergency room.      HYDRATION: Based on the patient's presentation of Hyperglycemia and tachycardia the patient was given IV fluids. IV Hydration was used because oral hydration was not adequate alone. Upon recheck following hydration, the patient was improved with tachycardia resolved.      ADDITIONAL PROBLEM LIST  Polypharmacy   DM1  Hyperglycemia  Medication noncompliance    DISPOSITION AND DISCUSSIONS  Patient eloped from the emergency department prior to me discussing AMA versus discharge plans    FINAL DIAGNOSIS  Polypharmacy   Type 1 diabetes  Medication noncompliance       Electronically signed by: Sally Mayen M.D., 8/25/2023 12:54  AM

## 2023-08-25 NOTE — ED NOTES
Lunch RN: Pt reporting itching. ERP updated, orders received. Pt still very drowsy with slurred speech. Family at bedside. Call light within reach

## 2023-08-25 NOTE — ED TRIAGE NOTES
[facility]    Chief Complaint   Patient presents with    Unresponsive     Pt found to be GCS 3 by EMS. EMS placed NPA and pt awoke to GCS 15. Pt reports use of opiates this evening. BG found to be 489 by EMS. Pt presents lethargic but cooperative.         Pt placed on 2LPM via NC. ABCs intact. A+OX4 but lethargic. Skin PWD. Vitals/tele on the monitor.

## 2023-08-25 NOTE — ED NOTES
Pt noted to be leaving from room stating he will be leaving due to having work. Confirmed with pt IV was removed. Pt removed own IV and placed gauze and tape over site by himself. Bleeding controlled. Pt friend stated she will be the one transporting him home safely.    ERP made aware that pt eloped.

## 2023-09-22 ENCOUNTER — HOSPITAL ENCOUNTER (EMERGENCY)
Facility: MEDICAL CENTER | Age: 38
End: 2023-09-22
Payer: MEDICAID

## 2023-09-22 VITALS
HEIGHT: 64 IN | SYSTOLIC BLOOD PRESSURE: 137 MMHG | TEMPERATURE: 97.2 F | HEART RATE: 87 BPM | BODY MASS INDEX: 23.22 KG/M2 | WEIGHT: 136.02 LBS | DIASTOLIC BLOOD PRESSURE: 89 MMHG | RESPIRATION RATE: 16 BRPM | OXYGEN SATURATION: 98 %

## 2023-09-22 PROCEDURE — 302449 STATCHG TRIAGE ONLY (STATISTIC)

## 2023-09-22 ASSESSMENT — FIBROSIS 4 INDEX: FIB4 SCORE: 1.21

## 2023-09-22 NOTE — ED NOTES
Staff called patient name in lobby in attempt to bring patient back to assigned room. Staff unable to locate patient.

## 2023-09-22 NOTE — ED TRIAGE NOTES
"Chief Complaint   Patient presents with    Tooth Abscess     Pt has abscess located on L bottom tooth that started last weekend. Pt has dental appointment next week. Denies fever.     /89   Pulse 87   Temp 36.2 °C (97.2 °F) (Temporal)   Resp 16   Ht 1.626 m (5' 4\")   Wt 61.7 kg (136 lb 0.4 oz)   SpO2 98%   BMI 23.35 kg/m²     "

## 2024-08-31 ENCOUNTER — HOSPITAL ENCOUNTER (INPATIENT)
Facility: MEDICAL CENTER | Age: 39
End: 2024-08-31
Attending: EMERGENCY MEDICINE | Admitting: INTERNAL MEDICINE

## 2024-08-31 DIAGNOSIS — E10.10 DKA, TYPE 1, NOT AT GOAL (HCC): ICD-10-CM

## 2024-08-31 DIAGNOSIS — E10.10 DIABETIC KETOACIDOSIS WITHOUT COMA ASSOCIATED WITH TYPE 1 DIABETES MELLITUS (HCC): ICD-10-CM

## 2024-08-31 LAB
ACETONE UR QL: ABNORMAL
ALBUMIN SERPL BCP-MCNC: 4.9 G/DL (ref 3.2–4.9)
ALBUMIN/GLOB SERPL: 1 G/DL
ALP SERPL-CCNC: 169 U/L (ref 30–99)
ALT SERPL-CCNC: 13 U/L (ref 2–50)
ANION GAP SERPL CALC-SCNC: 22 MMOL/L (ref 7–16)
ANION GAP SERPL CALC-SCNC: 41 MMOL/L (ref 7–16)
APPEARANCE UR: CLEAR
AST SERPL-CCNC: 10 U/L (ref 12–45)
B-OH-BUTYR SERPL-MCNC: >8 MMOL/L (ref 0.02–0.27)
BASE EXCESS BLDV CALC-SCNC: -14 MMOL/L
BASOPHILS # BLD AUTO: 0.2 % (ref 0–1.8)
BASOPHILS # BLD: 0.03 K/UL (ref 0–0.12)
BILIRUB SERPL-MCNC: 0.7 MG/DL (ref 0.1–1.5)
BILIRUB UR QL STRIP.AUTO: NEGATIVE
BODY TEMPERATURE: 36.9 CENTIGRADE
BUN SERPL-MCNC: 37 MG/DL (ref 8–22)
BUN SERPL-MCNC: 45 MG/DL (ref 8–22)
CALCIUM ALBUM COR SERPL-MCNC: 9.6 MG/DL (ref 8.5–10.5)
CALCIUM SERPL-MCNC: 10.3 MG/DL (ref 8.5–10.5)
CALCIUM SERPL-MCNC: 9.3 MG/DL (ref 8.5–10.5)
CHLORIDE SERPL-SCNC: 103 MMOL/L (ref 96–112)
CHLORIDE SERPL-SCNC: 85 MMOL/L (ref 96–112)
CO2 SERPL-SCNC: 12 MMOL/L (ref 20–33)
CO2 SERPL-SCNC: 16 MMOL/L (ref 20–33)
COLOR UR: YELLOW
CREAT SERPL-MCNC: 1.17 MG/DL (ref 0.5–1.4)
CREAT SERPL-MCNC: 1.56 MG/DL (ref 0.5–1.4)
EOSINOPHIL # BLD AUTO: 0 K/UL (ref 0–0.51)
EOSINOPHIL NFR BLD: 0 % (ref 0–6.9)
ERYTHROCYTE [DISTWIDTH] IN BLOOD BY AUTOMATED COUNT: 40 FL (ref 35.9–50)
EST. AVERAGE GLUCOSE BLD GHB EST-MCNC: 252 MG/DL
GFR SERPLBLD CREATININE-BSD FMLA CKD-EPI: 58 ML/MIN/1.73 M 2
GFR SERPLBLD CREATININE-BSD FMLA CKD-EPI: 82 ML/MIN/1.73 M 2
GLOBULIN SER CALC-MCNC: 4.7 G/DL (ref 1.9–3.5)
GLUCOSE BLD STRIP.AUTO-MCNC: 444 MG/DL (ref 65–99)
GLUCOSE BLD STRIP.AUTO-MCNC: 487 MG/DL (ref 65–99)
GLUCOSE BLD STRIP.AUTO-MCNC: 493 MG/DL (ref 65–99)
GLUCOSE BLD STRIP.AUTO-MCNC: 599 MG/DL (ref 65–99)
GLUCOSE SERPL-MCNC: 326 MG/DL (ref 65–99)
GLUCOSE SERPL-MCNC: 668 MG/DL (ref 65–99)
GLUCOSE UR STRIP.AUTO-MCNC: 500 MG/DL
HBA1C MFR BLD: 10.4 % (ref 4–5.6)
HCO3 BLDV-SCNC: 13 MMOL/L (ref 24–28)
HCT VFR BLD AUTO: 48.2 % (ref 42–52)
HGB BLD-MCNC: 16.6 G/DL (ref 14–18)
IMM GRANULOCYTES # BLD AUTO: 0.28 K/UL (ref 0–0.11)
IMM GRANULOCYTES NFR BLD AUTO: 1.7 % (ref 0–0.9)
INHALED O2 FLOW RATE: ABNORMAL L/MIN
KETONES UR STRIP.AUTO-MCNC: >=80 MG/DL
LEUKOCYTE ESTERASE UR QL STRIP.AUTO: NEGATIVE
LIPASE SERPL-CCNC: 22 U/L (ref 11–82)
LYMPHOCYTES # BLD AUTO: 1.09 K/UL (ref 1–4.8)
LYMPHOCYTES NFR BLD: 6.7 % (ref 22–41)
MAGNESIUM SERPL-MCNC: 2.9 MG/DL (ref 1.5–2.5)
MCH RBC QN AUTO: 29.4 PG (ref 27–33)
MCHC RBC AUTO-ENTMCNC: 34.4 G/DL (ref 32.3–36.5)
MCV RBC AUTO: 85.3 FL (ref 81.4–97.8)
MICRO URNS: ABNORMAL
MONOCYTES # BLD AUTO: 0.68 K/UL (ref 0–0.85)
MONOCYTES NFR BLD AUTO: 4.2 % (ref 0–13.4)
NEUTROPHILS # BLD AUTO: 14.26 K/UL (ref 1.82–7.42)
NEUTROPHILS NFR BLD: 87.2 % (ref 44–72)
NITRITE UR QL STRIP.AUTO: NEGATIVE
NRBC # BLD AUTO: 0 K/UL
NRBC BLD-RTO: 0 /100 WBC (ref 0–0.2)
OSMOLALITY SERPL: 359 MOSM/KG H2O (ref 278–298)
PCO2 BLDV: 31.3 MMHG (ref 41–51)
PCO2 TEMP ADJ BLDV: 31.2 MMHG (ref 41–51)
PH BLDV: 7.22 [PH] (ref 7.31–7.45)
PH TEMP ADJ BLDV: 7.22 [PH] (ref 7.31–7.45)
PH UR STRIP.AUTO: 6 [PH] (ref 5–8)
PHOSPHATE SERPL-MCNC: 6.6 MG/DL (ref 2.5–4.5)
PLATELET # BLD AUTO: 533 K/UL (ref 164–446)
PMV BLD AUTO: 11.4 FL (ref 9–12.9)
PO2 BLDV: 43.2 MMHG (ref 25–40)
PO2 TEMP ADJ BLDV: 42.9 MMHG (ref 25–40)
POTASSIUM SERPL-SCNC: 4.3 MMOL/L (ref 3.6–5.5)
POTASSIUM SERPL-SCNC: 4.5 MMOL/L (ref 3.6–5.5)
PROT SERPL-MCNC: 9.6 G/DL (ref 6–8.2)
PROT UR QL STRIP: NEGATIVE MG/DL
RBC # BLD AUTO: 5.65 M/UL (ref 4.7–6.1)
RBC UR QL AUTO: NEGATIVE
SAO2 % BLDV: 71.1 %
SODIUM SERPL-SCNC: 138 MMOL/L (ref 135–145)
SODIUM SERPL-SCNC: 141 MMOL/L (ref 135–145)
SP GR UR STRIP.AUTO: 1.02
SPECIMEN SOURCE: NORMAL
UROBILINOGEN UR STRIP.AUTO-MCNC: 0.2 MG/DL
WBC # BLD AUTO: 16.3 K/UL (ref 4.8–10.8)

## 2024-08-31 PROCEDURE — 81002 URINALYSIS NONAUTO W/O SCOPE: CPT

## 2024-08-31 PROCEDURE — 87491 CHLMYD TRACH DNA AMP PROBE: CPT

## 2024-08-31 PROCEDURE — 83690 ASSAY OF LIPASE: CPT

## 2024-08-31 PROCEDURE — 700105 HCHG RX REV CODE 258: Performed by: INTERNAL MEDICINE

## 2024-08-31 PROCEDURE — 85025 COMPLETE CBC W/AUTO DIFF WBC: CPT

## 2024-08-31 PROCEDURE — A9270 NON-COVERED ITEM OR SERVICE: HCPCS | Performed by: INTERNAL MEDICINE

## 2024-08-31 PROCEDURE — 99291 CRITICAL CARE FIRST HOUR: CPT

## 2024-08-31 PROCEDURE — 99291 CRITICAL CARE FIRST HOUR: CPT | Performed by: INTERNAL MEDICINE

## 2024-08-31 PROCEDURE — 303105 HCHG CATHETER EXTRA

## 2024-08-31 PROCEDURE — 96365 THER/PROPH/DIAG IV INF INIT: CPT

## 2024-08-31 PROCEDURE — 83930 ASSAY OF BLOOD OSMOLALITY: CPT

## 2024-08-31 PROCEDURE — 82010 KETONE BODYS QUAN: CPT

## 2024-08-31 PROCEDURE — 36415 COLL VENOUS BLD VENIPUNCTURE: CPT

## 2024-08-31 PROCEDURE — 51702 INSERT TEMP BLADDER CATH: CPT

## 2024-08-31 PROCEDURE — 82962 GLUCOSE BLOOD TEST: CPT | Mod: 91

## 2024-08-31 PROCEDURE — 83735 ASSAY OF MAGNESIUM: CPT

## 2024-08-31 PROCEDURE — 82803 BLOOD GASES ANY COMBINATION: CPT

## 2024-08-31 PROCEDURE — 84100 ASSAY OF PHOSPHORUS: CPT

## 2024-08-31 PROCEDURE — 96361 HYDRATE IV INFUSION ADD-ON: CPT

## 2024-08-31 PROCEDURE — 700105 HCHG RX REV CODE 258: Performed by: EMERGENCY MEDICINE

## 2024-08-31 PROCEDURE — 83036 HEMOGLOBIN GLYCOSYLATED A1C: CPT

## 2024-08-31 PROCEDURE — 770022 HCHG ROOM/CARE - ICU (200)

## 2024-08-31 PROCEDURE — 81003 URINALYSIS AUTO W/O SCOPE: CPT

## 2024-08-31 PROCEDURE — 80053 COMPREHEN METABOLIC PANEL: CPT

## 2024-08-31 PROCEDURE — 700102 HCHG RX REV CODE 250 W/ 637 OVERRIDE(OP): Performed by: INTERNAL MEDICINE

## 2024-08-31 PROCEDURE — 700111 HCHG RX REV CODE 636 W/ 250 OVERRIDE (IP): Performed by: INTERNAL MEDICINE

## 2024-08-31 PROCEDURE — 87591 N.GONORRHOEAE DNA AMP PROB: CPT

## 2024-08-31 PROCEDURE — 80048 BASIC METABOLIC PNL TOTAL CA: CPT

## 2024-08-31 PROCEDURE — 87086 URINE CULTURE/COLONY COUNT: CPT

## 2024-08-31 RX ORDER — DEXTROSE AND SODIUM CHLORIDE 10; .45 G/100ML; G/100ML
INJECTION, SOLUTION INTRAVENOUS CONTINUOUS
Status: ACTIVE | OUTPATIENT
Start: 2024-08-31 | End: 2024-09-01

## 2024-08-31 RX ORDER — HEPARIN SODIUM 5000 [USP'U]/ML
5000 INJECTION, SOLUTION INTRAVENOUS; SUBCUTANEOUS EVERY 8 HOURS
Status: DISCONTINUED | OUTPATIENT
Start: 2024-08-31 | End: 2024-09-01

## 2024-08-31 RX ORDER — SODIUM CHLORIDE, SODIUM LACTATE, POTASSIUM CHLORIDE, CALCIUM CHLORIDE 600; 310; 30; 20 MG/100ML; MG/100ML; MG/100ML; MG/100ML
INJECTION, SOLUTION INTRAVENOUS CONTINUOUS
Status: DISCONTINUED | OUTPATIENT
Start: 2024-08-31 | End: 2024-09-01

## 2024-08-31 RX ORDER — CALCIUM CARBONATE 500 MG/1
500 TABLET, CHEWABLE ORAL 2 TIMES DAILY PRN
Status: DISCONTINUED | OUTPATIENT
Start: 2024-08-31 | End: 2024-09-02 | Stop reason: HOSPADM

## 2024-08-31 RX ORDER — SODIUM CHLORIDE, SODIUM LACTATE, POTASSIUM CHLORIDE, AND CALCIUM CHLORIDE .6; .31; .03; .02 G/100ML; G/100ML; G/100ML; G/100ML
2000 INJECTION, SOLUTION INTRAVENOUS ONCE
Status: DISCONTINUED | OUTPATIENT
Start: 2024-08-31 | End: 2024-08-31

## 2024-08-31 RX ORDER — AMOXICILLIN 250 MG
2 CAPSULE ORAL 2 TIMES DAILY
Status: DISCONTINUED | OUTPATIENT
Start: 2024-08-31 | End: 2024-09-02 | Stop reason: HOSPADM

## 2024-08-31 RX ORDER — ONDANSETRON 2 MG/ML
4 INJECTION INTRAMUSCULAR; INTRAVENOUS EVERY 4 HOURS PRN
Status: DISCONTINUED | OUTPATIENT
Start: 2024-08-31 | End: 2024-09-02 | Stop reason: HOSPADM

## 2024-08-31 RX ORDER — POTASSIUM CHLORIDE 7.45 MG/ML
10 INJECTION INTRAVENOUS
Status: COMPLETED | OUTPATIENT
Start: 2024-08-31 | End: 2024-08-31

## 2024-08-31 RX ORDER — MAGNESIUM SULFATE HEPTAHYDRATE 40 MG/ML
4 INJECTION, SOLUTION INTRAVENOUS
Status: DISCONTINUED | OUTPATIENT
Start: 2024-08-31 | End: 2024-09-01

## 2024-08-31 RX ORDER — SODIUM CHLORIDE, SODIUM LACTATE, POTASSIUM CHLORIDE, CALCIUM CHLORIDE 600; 310; 30; 20 MG/100ML; MG/100ML; MG/100ML; MG/100ML
INJECTION, SOLUTION INTRAVENOUS CONTINUOUS
Status: DISCONTINUED | OUTPATIENT
Start: 2024-08-31 | End: 2024-08-31

## 2024-08-31 RX ORDER — MAGNESIUM SULFATE HEPTAHYDRATE 40 MG/ML
2 INJECTION, SOLUTION INTRAVENOUS
Status: DISCONTINUED | OUTPATIENT
Start: 2024-08-31 | End: 2024-09-01

## 2024-08-31 RX ORDER — DEXTROSE, SODIUM CHLORIDE, SODIUM LACTATE, POTASSIUM CHLORIDE, AND CALCIUM CHLORIDE 5; .6; .31; .03; .02 G/100ML; G/100ML; G/100ML; G/100ML; G/100ML
INJECTION, SOLUTION INTRAVENOUS CONTINUOUS
Status: DISCONTINUED | OUTPATIENT
Start: 2024-08-31 | End: 2024-09-01

## 2024-08-31 RX ORDER — ONDANSETRON 4 MG/1
4 TABLET, ORALLY DISINTEGRATING ORAL EVERY 4 HOURS PRN
Status: DISCONTINUED | OUTPATIENT
Start: 2024-08-31 | End: 2024-09-02 | Stop reason: HOSPADM

## 2024-08-31 RX ORDER — SODIUM CHLORIDE, SODIUM LACTATE, POTASSIUM CHLORIDE, CALCIUM CHLORIDE 600; 310; 30; 20 MG/100ML; MG/100ML; MG/100ML; MG/100ML
1000 INJECTION, SOLUTION INTRAVENOUS ONCE
Status: COMPLETED | OUTPATIENT
Start: 2024-08-31 | End: 2024-08-31

## 2024-08-31 RX ORDER — POLYETHYLENE GLYCOL 3350 17 G/17G
1 POWDER, FOR SOLUTION ORAL
Status: DISCONTINUED | OUTPATIENT
Start: 2024-08-31 | End: 2024-09-02 | Stop reason: HOSPADM

## 2024-08-31 RX ORDER — PROMETHAZINE HYDROCHLORIDE 25 MG/1
12.5-25 TABLET ORAL EVERY 4 HOURS PRN
Status: DISCONTINUED | OUTPATIENT
Start: 2024-08-31 | End: 2024-09-02 | Stop reason: HOSPADM

## 2024-08-31 RX ORDER — TAMSULOSIN HYDROCHLORIDE 0.4 MG/1
0.4 CAPSULE ORAL
Status: DISCONTINUED | OUTPATIENT
Start: 2024-08-31 | End: 2024-09-02 | Stop reason: HOSPADM

## 2024-08-31 RX ORDER — LABETALOL HYDROCHLORIDE 5 MG/ML
10 INJECTION, SOLUTION INTRAVENOUS EVERY 4 HOURS PRN
Status: DISCONTINUED | OUTPATIENT
Start: 2024-08-31 | End: 2024-09-02 | Stop reason: HOSPADM

## 2024-08-31 RX ORDER — ACETAMINOPHEN 325 MG/1
650 TABLET ORAL EVERY 6 HOURS PRN
Status: DISCONTINUED | OUTPATIENT
Start: 2024-08-31 | End: 2024-09-02 | Stop reason: HOSPADM

## 2024-08-31 RX ORDER — PROMETHAZINE HYDROCHLORIDE 25 MG/1
12.5-25 SUPPOSITORY RECTAL EVERY 4 HOURS PRN
Status: DISCONTINUED | OUTPATIENT
Start: 2024-08-31 | End: 2024-09-02 | Stop reason: HOSPADM

## 2024-08-31 RX ORDER — PROCHLORPERAZINE EDISYLATE 5 MG/ML
5-10 INJECTION INTRAMUSCULAR; INTRAVENOUS EVERY 4 HOURS PRN
Status: DISCONTINUED | OUTPATIENT
Start: 2024-08-31 | End: 2024-09-02 | Stop reason: HOSPADM

## 2024-08-31 RX ADMIN — HEPARIN SODIUM 5000 UNITS: 5000 INJECTION, SOLUTION INTRAVENOUS; SUBCUTANEOUS at 23:23

## 2024-08-31 RX ADMIN — TAMSULOSIN HYDROCHLORIDE 0.4 MG: 0.4 CAPSULE ORAL at 19:15

## 2024-08-31 RX ADMIN — ANTACID TABLETS 500 MG: 500 TABLET, CHEWABLE ORAL at 21:28

## 2024-08-31 RX ADMIN — SODIUM CHLORIDE 4 UNITS/HR: 9 INJECTION, SOLUTION INTRAVENOUS at 19:23

## 2024-08-31 RX ADMIN — POTASSIUM CHLORIDE 10 MEQ: 7.46 INJECTION, SOLUTION INTRAVENOUS at 19:26

## 2024-08-31 RX ADMIN — SODIUM CHLORIDE, POTASSIUM CHLORIDE, SODIUM LACTATE AND CALCIUM CHLORIDE 1000 ML: 600; 310; 30; 20 INJECTION, SOLUTION INTRAVENOUS at 18:00

## 2024-08-31 RX ADMIN — SODIUM CHLORIDE 4 UNITS/HR: 9 INJECTION, SOLUTION INTRAVENOUS at 18:48

## 2024-08-31 RX ADMIN — POTASSIUM CHLORIDE 10 MEQ: 7.46 INJECTION, SOLUTION INTRAVENOUS at 20:48

## 2024-08-31 RX ADMIN — SODIUM CHLORIDE, POTASSIUM CHLORIDE, SODIUM LACTATE AND CALCIUM CHLORIDE: 600; 310; 30; 20 INJECTION, SOLUTION INTRAVENOUS at 19:18

## 2024-08-31 SDOH — ECONOMIC STABILITY: TRANSPORTATION INSECURITY
IN THE PAST 12 MONTHS, HAS THE LACK OF TRANSPORTATION KEPT YOU FROM MEDICAL APPOINTMENTS OR FROM GETTING MEDICATIONS?: NO

## 2024-08-31 SDOH — ECONOMIC STABILITY: TRANSPORTATION INSECURITY
IN THE PAST 12 MONTHS, HAS LACK OF RELIABLE TRANSPORTATION KEPT YOU FROM MEDICAL APPOINTMENTS, MEETINGS, WORK OR FROM GETTING THINGS NEEDED FOR DAILY LIVING?: NO

## 2024-08-31 ASSESSMENT — COGNITIVE AND FUNCTIONAL STATUS - GENERAL
MOBILITY SCORE: 21
SUGGESTED CMS G CODE MODIFIER MOBILITY: CJ
DRESSING REGULAR UPPER BODY CLOTHING: A LITTLE
SUGGESTED CMS G CODE MODIFIER DAILY ACTIVITY: CJ
CLIMB 3 TO 5 STEPS WITH RAILING: A LITTLE
EATING MEALS: A LITTLE
DAILY ACTIVITIY SCORE: 21
STANDING UP FROM CHAIR USING ARMS: A LITTLE
WALKING IN HOSPITAL ROOM: A LITTLE
TOILETING: A LITTLE

## 2024-08-31 ASSESSMENT — LIFESTYLE VARIABLES
CONSUMPTION TOTAL: NEGATIVE
TOTAL SCORE: 0
EVER HAD A DRINK FIRST THING IN THE MORNING TO STEADY YOUR NERVES TO GET RID OF A HANGOVER: NO
HAVE YOU EVER FELT YOU SHOULD CUT DOWN ON YOUR DRINKING: NO
HOW MANY TIMES IN THE PAST YEAR HAVE YOU HAD 5 OR MORE DRINKS IN A DAY: 0
ON A TYPICAL DAY WHEN YOU DRINK ALCOHOL HOW MANY DRINKS DO YOU HAVE: 0
TOTAL SCORE: 0
HAVE PEOPLE ANNOYED YOU BY CRITICIZING YOUR DRINKING: NO
TOTAL SCORE: 0
DOES PATIENT WANT TO STOP DRINKING: NO
AVERAGE NUMBER OF DAYS PER WEEK YOU HAVE A DRINK CONTAINING ALCOHOL: 1
EVER FELT BAD OR GUILTY ABOUT YOUR DRINKING: NO
ALCOHOL_USE: YES

## 2024-08-31 ASSESSMENT — SOCIAL DETERMINANTS OF HEALTH (SDOH)
WITHIN THE LAST YEAR, HAVE YOU BEEN HUMILIATED OR EMOTIONALLY ABUSED IN OTHER WAYS BY YOUR PARTNER OR EX-PARTNER?: NO
IN THE PAST 12 MONTHS, HAS THE ELECTRIC, GAS, OIL, OR WATER COMPANY THREATENED TO SHUT OFF SERVICE IN YOUR HOME?: NO
WITHIN THE LAST YEAR, HAVE YOU BEEN AFRAID OF YOUR PARTNER OR EX-PARTNER?: NO
WITHIN THE PAST 12 MONTHS, YOU WORRIED THAT YOUR FOOD WOULD RUN OUT BEFORE YOU GOT THE MONEY TO BUY MORE: NEVER TRUE
WITHIN THE PAST 12 MONTHS, THE FOOD YOU BOUGHT JUST DIDN'T LAST AND YOU DIDN'T HAVE MONEY TO GET MORE: NEVER TRUE
WITHIN THE LAST YEAR, HAVE YOU BEEN KICKED, HIT, SLAPPED, OR OTHERWISE PHYSICALLY HURT BY YOUR PARTNER OR EX-PARTNER?: NO
WITHIN THE LAST YEAR, HAVE TO BEEN RAPED OR FORCED TO HAVE ANY KIND OF SEXUAL ACTIVITY BY YOUR PARTNER OR EX-PARTNER?: NO

## 2024-08-31 ASSESSMENT — FIBROSIS 4 INDEX
FIB4 SCORE: 1.21
FIB4 SCORE: 0.19

## 2024-08-31 ASSESSMENT — PAIN DESCRIPTION - PAIN TYPE: TYPE: ACUTE PAIN

## 2024-08-31 NOTE — ED TRIAGE NOTES
"Chief Complaint   Patient presents with    Blood in Urine     Since last night. Denies pain with urination. Reports frequency. Patient states \"since this morning, It's hard to pee.\" Last urination last night.     Abdominal Pain     Abd pain/N/V x 2 days. Type 1 DM - last use of insulin 3 days ago in longterm. FSBS in triage 599.        "

## 2024-08-31 NOTE — ED NOTES
Triage note reviewed and agreed with. Pt dressed down to gown, placed on continuous monitoring.  20g PIV established to RAC. Denies needs/concerns at this time.

## 2024-08-31 NOTE — ED NOTES
"Med rec is complete per patient at bedside  Patient reports recently released from [facility] and hasn't had insulin since (3 days ago). States he normally receives \"20units long insulin and 20units short insulin\".  Outpatient antibiotics within the last 30 days: NONE  Anticoagulants: NONE      Idalia Bradford, PhT                "

## 2024-09-01 VITALS
RESPIRATION RATE: 23 BRPM | SYSTOLIC BLOOD PRESSURE: 146 MMHG | HEIGHT: 64 IN | HEART RATE: 108 BPM | OXYGEN SATURATION: 97 % | DIASTOLIC BLOOD PRESSURE: 87 MMHG | TEMPERATURE: 99.2 F | BODY MASS INDEX: 22.39 KG/M2 | WEIGHT: 131.17 LBS

## 2024-09-01 PROBLEM — N17.9 ACUTE KIDNEY INJURY (NONTRAUMATIC) (HCC): Status: ACTIVE | Noted: 2024-09-01

## 2024-09-01 LAB
ALBUMIN SERPL BCP-MCNC: 3.7 G/DL (ref 3.2–4.9)
ALBUMIN/GLOB SERPL: 1.1 G/DL
ALP SERPL-CCNC: 119 U/L (ref 30–99)
ALT SERPL-CCNC: 11 U/L (ref 2–50)
ANION GAP SERPL CALC-SCNC: 10 MMOL/L (ref 7–16)
ANION GAP SERPL CALC-SCNC: 16 MMOL/L (ref 7–16)
AST SERPL-CCNC: 8 U/L (ref 12–45)
BASOPHILS # BLD AUTO: 0.1 % (ref 0–1.8)
BASOPHILS # BLD: 0.02 K/UL (ref 0–0.12)
BILIRUB SERPL-MCNC: 0.7 MG/DL (ref 0.1–1.5)
BUN SERPL-MCNC: 30 MG/DL (ref 8–22)
BUN SERPL-MCNC: 34 MG/DL (ref 8–22)
C TRACH DNA SPEC QL NAA+PROBE: NEGATIVE
CALCIUM ALBUM COR SERPL-MCNC: 9.1 MG/DL (ref 8.5–10.5)
CALCIUM SERPL-MCNC: 8.9 MG/DL (ref 8.5–10.5)
CALCIUM SERPL-MCNC: 9.4 MG/DL (ref 8.5–10.5)
CHLORIDE SERPL-SCNC: 106 MMOL/L (ref 96–112)
CHLORIDE SERPL-SCNC: 110 MMOL/L (ref 96–112)
CO2 SERPL-SCNC: 20 MMOL/L (ref 20–33)
CO2 SERPL-SCNC: 24 MMOL/L (ref 20–33)
CREAT SERPL-MCNC: 1.04 MG/DL (ref 0.5–1.4)
CREAT SERPL-MCNC: 1.11 MG/DL (ref 0.5–1.4)
EOSINOPHIL # BLD AUTO: 0 K/UL (ref 0–0.51)
EOSINOPHIL NFR BLD: 0 % (ref 0–6.9)
ERYTHROCYTE [DISTWIDTH] IN BLOOD BY AUTOMATED COUNT: 38.9 FL (ref 35.9–50)
GFR SERPLBLD CREATININE-BSD FMLA CKD-EPI: 88 ML/MIN/1.73 M 2
GFR SERPLBLD CREATININE-BSD FMLA CKD-EPI: 95 ML/MIN/1.73 M 2
GLOBULIN SER CALC-MCNC: 3.3 G/DL (ref 1.9–3.5)
GLUCOSE BLD STRIP.AUTO-MCNC: 117 MG/DL (ref 65–99)
GLUCOSE BLD STRIP.AUTO-MCNC: 147 MG/DL (ref 65–99)
GLUCOSE BLD STRIP.AUTO-MCNC: 172 MG/DL (ref 65–99)
GLUCOSE BLD STRIP.AUTO-MCNC: 207 MG/DL (ref 65–99)
GLUCOSE BLD STRIP.AUTO-MCNC: 210 MG/DL (ref 65–99)
GLUCOSE BLD STRIP.AUTO-MCNC: 218 MG/DL (ref 65–99)
GLUCOSE BLD STRIP.AUTO-MCNC: 218 MG/DL (ref 65–99)
GLUCOSE BLD STRIP.AUTO-MCNC: 247 MG/DL (ref 65–99)
GLUCOSE BLD STRIP.AUTO-MCNC: 264 MG/DL (ref 65–99)
GLUCOSE BLD STRIP.AUTO-MCNC: 298 MG/DL (ref 65–99)
GLUCOSE BLD STRIP.AUTO-MCNC: 331 MG/DL (ref 65–99)
GLUCOSE BLD STRIP.AUTO-MCNC: 87 MG/DL (ref 65–99)
GLUCOSE BLD STRIP.AUTO-MCNC: 92 MG/DL (ref 65–99)
GLUCOSE BLD STRIP.AUTO-MCNC: 97 MG/DL (ref 65–99)
GLUCOSE SERPL-MCNC: 118 MG/DL (ref 65–99)
GLUCOSE SERPL-MCNC: 235 MG/DL (ref 65–99)
HCT VFR BLD AUTO: 38.6 % (ref 42–52)
HGB BLD-MCNC: 13.3 G/DL (ref 14–18)
IMM GRANULOCYTES # BLD AUTO: 0.06 K/UL (ref 0–0.11)
IMM GRANULOCYTES NFR BLD AUTO: 0.4 % (ref 0–0.9)
LYMPHOCYTES # BLD AUTO: 1.12 K/UL (ref 1–4.8)
LYMPHOCYTES NFR BLD: 7.8 % (ref 22–41)
MAGNESIUM SERPL-MCNC: 2.4 MG/DL (ref 1.5–2.5)
MAGNESIUM SERPL-MCNC: 2.5 MG/DL (ref 1.5–2.5)
MCH RBC QN AUTO: 28.7 PG (ref 27–33)
MCHC RBC AUTO-ENTMCNC: 34.5 G/DL (ref 32.3–36.5)
MCV RBC AUTO: 83.4 FL (ref 81.4–97.8)
MONOCYTES # BLD AUTO: 1.28 K/UL (ref 0–0.85)
MONOCYTES NFR BLD AUTO: 8.9 % (ref 0–13.4)
N GONORRHOEA DNA SPEC QL NAA+PROBE: NEGATIVE
NEUTROPHILS # BLD AUTO: 11.88 K/UL (ref 1.82–7.42)
NEUTROPHILS NFR BLD: 82.8 % (ref 44–72)
NRBC # BLD AUTO: 0 K/UL
NRBC BLD-RTO: 0 /100 WBC (ref 0–0.2)
PHOSPHATE SERPL-MCNC: 1.1 MG/DL (ref 2.5–4.5)
PHOSPHATE SERPL-MCNC: 1.8 MG/DL (ref 2.5–4.5)
PLATELET # BLD AUTO: 379 K/UL (ref 164–446)
PMV BLD AUTO: 10.7 FL (ref 9–12.9)
POTASSIUM SERPL-SCNC: 4.2 MMOL/L (ref 3.6–5.5)
POTASSIUM SERPL-SCNC: 4.3 MMOL/L (ref 3.6–5.5)
PROT SERPL-MCNC: 7 G/DL (ref 6–8.2)
RBC # BLD AUTO: 4.63 M/UL (ref 4.7–6.1)
SODIUM SERPL-SCNC: 142 MMOL/L (ref 135–145)
SODIUM SERPL-SCNC: 144 MMOL/L (ref 135–145)
SPECIMEN SOURCE: NORMAL
WBC # BLD AUTO: 14.4 K/UL (ref 4.8–10.8)

## 2024-09-01 PROCEDURE — 700105 HCHG RX REV CODE 258: Performed by: INTERNAL MEDICINE

## 2024-09-01 PROCEDURE — A9270 NON-COVERED ITEM OR SERVICE: HCPCS | Performed by: INTERNAL MEDICINE

## 2024-09-01 PROCEDURE — 99254 IP/OBS CNSLTJ NEW/EST MOD 60: CPT | Performed by: HOSPITALIST

## 2024-09-01 PROCEDURE — 80048 BASIC METABOLIC PNL TOTAL CA: CPT

## 2024-09-01 PROCEDURE — 700111 HCHG RX REV CODE 636 W/ 250 OVERRIDE (IP): Mod: JZ | Performed by: INTERNAL MEDICINE

## 2024-09-01 PROCEDURE — 80053 COMPREHEN METABOLIC PANEL: CPT

## 2024-09-01 PROCEDURE — 83735 ASSAY OF MAGNESIUM: CPT | Mod: 91

## 2024-09-01 PROCEDURE — 84100 ASSAY OF PHOSPHORUS: CPT

## 2024-09-01 PROCEDURE — 700102 HCHG RX REV CODE 250 W/ 637 OVERRIDE(OP): Performed by: HOSPITALIST

## 2024-09-01 PROCEDURE — 99233 SBSQ HOSP IP/OBS HIGH 50: CPT | Performed by: INTERNAL MEDICINE

## 2024-09-01 PROCEDURE — A9270 NON-COVERED ITEM OR SERVICE: HCPCS | Performed by: HOSPITALIST

## 2024-09-01 PROCEDURE — 82962 GLUCOSE BLOOD TEST: CPT | Mod: 91

## 2024-09-01 PROCEDURE — 700101 HCHG RX REV CODE 250: Performed by: INTERNAL MEDICINE

## 2024-09-01 PROCEDURE — 85025 COMPLETE CBC W/AUTO DIFF WBC: CPT

## 2024-09-01 PROCEDURE — 770001 HCHG ROOM/CARE - MED/SURG/GYN PRIV*

## 2024-09-01 PROCEDURE — 700102 HCHG RX REV CODE 250 W/ 637 OVERRIDE(OP): Performed by: INTERNAL MEDICINE

## 2024-09-01 RX ORDER — OMEPRAZOLE 20 MG/1
20 CAPSULE, DELAYED RELEASE ORAL DAILY
Status: DISCONTINUED | OUTPATIENT
Start: 2024-09-01 | End: 2024-09-02 | Stop reason: HOSPADM

## 2024-09-01 RX ORDER — ENOXAPARIN SODIUM 100 MG/ML
40 INJECTION SUBCUTANEOUS DAILY
Status: DISCONTINUED | OUTPATIENT
Start: 2024-09-01 | End: 2024-09-02 | Stop reason: HOSPADM

## 2024-09-01 RX ORDER — DEXTROSE MONOHYDRATE 25 G/50ML
25 INJECTION, SOLUTION INTRAVENOUS
Status: DISCONTINUED | OUTPATIENT
Start: 2024-09-01 | End: 2024-09-02 | Stop reason: HOSPADM

## 2024-09-01 RX ORDER — POTASSIUM CHLORIDE 7.45 MG/ML
10 INJECTION INTRAVENOUS ONCE
Status: COMPLETED | OUTPATIENT
Start: 2024-09-01 | End: 2024-09-01

## 2024-09-01 RX ORDER — LISINOPRIL 5 MG/1
5 TABLET ORAL DAILY
Status: DISCONTINUED | OUTPATIENT
Start: 2024-09-01 | End: 2024-09-02 | Stop reason: HOSPADM

## 2024-09-01 RX ADMIN — INSULIN GLARGINE-YFGN 22 UNITS: 100 INJECTION, SOLUTION SUBCUTANEOUS at 07:47

## 2024-09-01 RX ADMIN — POTASSIUM PHOSPHATE, MONOBASIC AND POTASSIUM PHOSPHATE, DIBASIC 30 MMOL: 224; 236 INJECTION, SOLUTION, CONCENTRATE INTRAVENOUS at 03:54

## 2024-09-01 RX ADMIN — POTASSIUM CHLORIDE 10 MEQ: 7.46 INJECTION, SOLUTION INTRAVENOUS at 00:17

## 2024-09-01 RX ADMIN — LIDOCAINE HYDROCHLORIDE 30 ML: 20 SOLUTION ORAL; TOPICAL at 17:07

## 2024-09-01 RX ADMIN — ANTACID TABLETS 500 MG: 500 TABLET, CHEWABLE ORAL at 20:44

## 2024-09-01 RX ADMIN — PROCHLORPERAZINE EDISYLATE 5 MG: 5 INJECTION INTRAMUSCULAR; INTRAVENOUS at 02:50

## 2024-09-01 RX ADMIN — HEPARIN SODIUM 5000 UNITS: 5000 INJECTION, SOLUTION INTRAVENOUS; SUBCUTANEOUS at 05:52

## 2024-09-01 RX ADMIN — LISINOPRIL 5 MG: 5 TABLET ORAL at 07:53

## 2024-09-01 RX ADMIN — SENNOSIDES AND DOCUSATE SODIUM 2 TABLET: 50; 8.6 TABLET ORAL at 17:07

## 2024-09-01 RX ADMIN — INSULIN HUMAN 4 UNITS: 100 INJECTION, SOLUTION PARENTERAL at 17:15

## 2024-09-01 RX ADMIN — OMEPRAZOLE 20 MG: 20 CAPSULE, DELAYED RELEASE ORAL at 07:53

## 2024-09-01 RX ADMIN — PROCHLORPERAZINE EDISYLATE 10 MG: 5 INJECTION INTRAMUSCULAR; INTRAVENOUS at 07:52

## 2024-09-01 RX ADMIN — SODIUM CHLORIDE, SODIUM LACTATE, POTASSIUM CHLORIDE, CALCIUM CHLORIDE AND DEXTROSE MONOHYDRATE: 5; 600; 310; 30; 20 INJECTION, SOLUTION INTRAVENOUS at 00:57

## 2024-09-01 RX ADMIN — ENOXAPARIN SODIUM 40 MG: 100 INJECTION SUBCUTANEOUS at 17:07

## 2024-09-01 RX ADMIN — SENNOSIDES AND DOCUSATE SODIUM 2 TABLET: 50; 8.6 TABLET ORAL at 05:53

## 2024-09-01 RX ADMIN — DEXTROSE AND SODIUM CHLORIDE: 10; .45 INJECTION, SOLUTION INTRAVENOUS at 04:53

## 2024-09-01 RX ADMIN — TAMSULOSIN HYDROCHLORIDE 0.4 MG: 0.4 CAPSULE ORAL at 07:53

## 2024-09-01 RX ADMIN — INSULIN HUMAN 4 UNITS: 100 INJECTION, SOLUTION PARENTERAL at 22:11

## 2024-09-01 ASSESSMENT — COGNITIVE AND FUNCTIONAL STATUS - GENERAL
STANDING UP FROM CHAIR USING ARMS: A LITTLE
WALKING IN HOSPITAL ROOM: A LITTLE
MOBILITY SCORE: 21
DAILY ACTIVITIY SCORE: 24
SUGGESTED CMS G CODE MODIFIER MOBILITY: CJ
SUGGESTED CMS G CODE MODIFIER DAILY ACTIVITY: CH
CLIMB 3 TO 5 STEPS WITH RAILING: A LITTLE

## 2024-09-01 ASSESSMENT — ENCOUNTER SYMPTOMS
DIZZINESS: 0
SORE THROAT: 0
NAUSEA: 0
VOMITING: 0
FEVER: 0
MUSCULOSKELETAL NEGATIVE: 1
NERVOUS/ANXIOUS: 1
EYES NEGATIVE: 1
PALPITATIONS: 0
BACK PAIN: 0
CHILLS: 0
ABDOMINAL PAIN: 0
SHORTNESS OF BREATH: 0
HEADACHES: 0
BLURRED VISION: 0
BRUISES/BLEEDS EASILY: 0
MYALGIAS: 0
SENSORY CHANGE: 0
ABDOMINAL PAIN: 1
VOMITING: 1
SPEECH CHANGE: 0
RESPIRATORY NEGATIVE: 1
DEPRESSION: 0
COUGH: 0
SPUTUM PRODUCTION: 0
NERVOUS/ANXIOUS: 0
NEUROLOGICAL NEGATIVE: 1
NAUSEA: 1
CARDIOVASCULAR NEGATIVE: 1

## 2024-09-01 ASSESSMENT — PAIN DESCRIPTION - PAIN TYPE
TYPE: ACUTE PAIN

## 2024-09-01 ASSESSMENT — FIBROSIS 4 INDEX: FIB4 SCORE: 0.19

## 2024-09-01 ASSESSMENT — LIFESTYLE VARIABLES: SUBSTANCE_ABUSE: 1

## 2024-09-01 NOTE — PROGRESS NOTES
4 Eyes Skin Assessment Completed by ANANYA Yung and ANANYA Zamora.    Head WDL  Ears Redness and Blanching  Nose WDL  Mouth Ulcer(s)  Neck WDL  Breast/Chest WDL  Shoulder Blades WDL  Spine WDL  (R) Arm/Elbow/Hand WDL  (L) Arm/Elbow/Hand WDL  Abdomen WDL  Groin WDL  Scrotum/Coccyx/Buttocks WDL  (R) Leg WDL  (L) Leg Abrasion  (R) Heel/Foot/Toe Redness and Blanching  (L) Heel/Foot/Toe Redness and Blanching          Devices In Places ECG, Tele Box, Blood Pressure Cuff, Pulse Ox, Cordero, and SCD's      Interventions In Place TAP System, Pillows, Q2 Turns, Low Air Loss Mattress, and Heels Loaded W/Pillows    Possible Skin Injury No    Pictures Uploaded Into Epic N/A  Wound Consult Placed N/A  RN Wound Prevention Protocol Ordered No

## 2024-09-01 NOTE — ASSESSMENT & PLAN NOTE
DKA secondary to medication non-compliance (no signs of infection found)  Transition from insulin drip to long and short acting insulin today  Begin diabetic diet  Discontinue IV fluids once tolerating oral hydration  Continue to monitor glucose, anion gap, bicarb, and electrolytes closely  Diabetic education

## 2024-09-01 NOTE — CONSULTS
Hospital Medicine Consultation    Date of Service  9/1/2024    Referring Physician  Jeremy M Gonda, M.D.    Consulting Physician  Ethan David M.D.    Reason for Consultation  Hospital medicine consultation requested and the patient admitted with DKA    History of Presenting Illness  38 y.o. male who presented 8/31/2024 with abdominal pain, nausea, vomiting, diagnosed with DKA on admission, the patient has a past med history including insulin requiring diabetes, hypertension, GERD, polysubstance abuse, hepatitis C, reportedly the patient was released from alf 4 days ago, and has not had had a chance to get his insulin apparently, he just went to  his prescriptions when he developed symptoms of DKA as described above, the patient presents to the emergency room, diagnosed with DKA, placed on insulin drip and admitted to the ICU.  The patient my evaluation is somnolent, lethargic, he complains of some nausea still, denies fevers chills, no abdominal pain.  Transitioning off insulin to long and short acting insulin and transitioning out of ICU to the medical unit as per the intensivist.  The patient is afebrile, heart rate in the 90s, respiration unlabored, blood pressure 120s over 50s, laboratory data and include a white count 14.4, hemoglobin 13.3, platelet count 379, glucose this morning 118, BUN 30, creatinine 1.04, follow-up blood glucose levels in the 80s and 90s, hemoglobin A1c is 10.4,  Review of Systems  Review of Systems   Constitutional:  Positive for malaise/fatigue.   HENT: Negative.     Eyes: Negative.    Respiratory: Negative.     Cardiovascular: Negative.    Gastrointestinal:  Positive for abdominal pain, nausea and vomiting.   Genitourinary: Negative.    Musculoskeletal: Negative.    Skin: Negative.    Neurological: Negative.    Endo/Heme/Allergies: Negative.    Psychiatric/Behavioral:  Positive for substance abuse. The patient is nervous/anxious.    All other systems reviewed and are  negative.      Past Medical History   has a past medical history of Diabetes (HCC), Hep C w/ coma, chronic (01/01/2003), and Hepatitis C.    Surgical History   has a past surgical history that includes hernia repair (Left, 4/2016).    Family History  None reported    Social History   reports that he has been smoking cigarettes. He has never used smokeless tobacco. He reports current alcohol use of about 1.8 oz of alcohol per week. He reports that he does not currently use drugs.    Medications  Prior to Admission Medications   Prescriptions Last Dose Informant Patient Reported? Taking?   Alcohol Swabs Supplies Patient No No   Sig: Wipe site with prep pad prior to injection.   Blood Glucose Monitoring Suppl (TRUE METRIX METER) w/Device Kit Supplies Patient No No   Sig: Test as directed   Insulin Pen Needle 32 G x 4 mm (BD PEN NEEDLE MAGDALENA U/F) Supplies Patient No No   Sig: Use for injections 3 times daily   TechLite Lancets Misc Supplies Patient No No   Sig: Use to test 3 times daily   glucose blood (TRUE METRIX BLOOD GLUCOSE TEST) strip Supplies Patient No No   Sig: Use to test 3 times daily as directd   insulin glargine (LANTUS SOLOSTAR) 100 UNIT/ML Solution Pen-injector injection 8/28/2024 at [facility] Patient No No   Sig: Inject 22 Units under the skin every morning.   Patient taking differently: Inject 20 Units under the skin every morning.   insulin lispro (HUMALOG,ADMELOG) 100 UNIT/ML Solution Pen-injector injection PEN 8/28/2024 at [facility] Patient No No   Sig: Inject 9 Units under the skin 3 times a day before meals. For glucose: 70-150mg/dL=0Units; 151-200mg/dL=2Units ;201-250mg/dL=3Units; 251-300mg/dL=5Units; 301-350mg/dL=6Units; 351-400mg/dL=8Units  >400 mg/dL=9Units; >400 x2 consecutive=9Units & call MD  if FSBG<or= to 70 mg/dL, give glucose & call MD   Patient taking differently: Inject 20 Units under the skin 3 times a day before meals. For glucose: 70-150mg/dL=0Units; 151-200mg/dL=2Units ;201-250mg/dL=3Units;  251-300mg/dL=5Units; 301-350mg/dL=6Units; 351-400mg/dL=8Units  >400 mg/dL=9Units; >400 x2 consecutive=9Units & call MD  if FSBG<or= to 70 mg/dL, give glucose & call MD   lisinopril (PRINIVIL) 5 MG Tab Unk at k Patient No No   Sig: Take 1 Tablet by mouth every day.   omeprazole (PRILOSEC) 20 MG Tablet Delayed Response delayed-release tablet Unk at Unk Patient No No   Sig: Take 1 Tablet by mouth every day.      Facility-Administered Medications: None       Allergies  No Known Allergies    Physical Exam  Temp:  [36.9 °C (98.5 °F)-37.3 °C (99.2 °F)] 37.3 °C (99.2 °F)  Pulse:  [] 96  Resp:  [10-23] 12  BP: (119-167)/() 119/76  SpO2:  [94 %-98 %] 96 %    Physical Exam  Vitals and nursing note reviewed.   Constitutional:       Appearance: He is well-developed. He is ill-appearing.      Comments: Pt seen and examined.   HENT:      Head: Normocephalic and atraumatic.      Mouth/Throat:      Comments: Poor dental status  Eyes:      Pupils: Pupils are equal, round, and reactive to light.   Cardiovascular:      Rate and Rhythm: Normal rate and regular rhythm.      Heart sounds: Normal heart sounds.   Pulmonary:      Effort: Pulmonary effort is normal.      Breath sounds: Normal breath sounds.   Abdominal:      General: Bowel sounds are normal.      Palpations: Abdomen is soft.   Genitourinary:     Penis: Normal.       Rectum: Normal.   Musculoskeletal:         General: Normal range of motion.      Cervical back: Normal range of motion and neck supple.   Skin:     General: Skin is warm and dry.      Coloration: Skin is pale.      Comments: Multiple tattoos   Neurological:      General: No focal deficit present.      Mental Status: He is alert and oriented to person, place, and time.   Psychiatric:         Behavior: Behavior normal.         Fluids      Laboratory  Recent Labs     08/31/24  1546 09/01/24  0551   WBC 16.3* 14.4*   RBC 5.65 4.63*   HEMOGLOBIN 16.6 13.3*   HEMATOCRIT 48.2 38.6*   MCV 85.3 83.4   MCH  29.4 28.7   MCHC 34.4 34.5   RDW 40.0 38.9   PLATELETCT 533* 379   MPV 11.4 10.7     Recent Labs     08/31/24  2318 09/01/24  0146 09/01/24  0551   SODIUM 141 142 144   POTASSIUM 4.5 4.3 4.2   CHLORIDE 103 106 110   CO2 16* 20 24   GLUCOSE 326* 235* 118*   BUN 37* 34* 30*   CREATININE 1.17 1.11 1.04   CALCIUM 9.3 9.4 8.9                     Imaging  No orders to display       Assessment/Plan  * DKA, type 1, not at goal (HCC)- (present on admission)  Assessment & Plan  DKA secondary to medication non-compliance (no signs of infection found)  Transition from insulin drip to long and short acting insulin today  Begin diabetic diet  Discontinue IV fluids once tolerating oral hydration  Continue to monitor glucose, anion gap, bicarb, and electrolytes closely  Diabetic education    Acute kidney injury (nontraumatic) ([facility])  Assessment & Plan  Prerenal - improving, nonoliguric  Avoid nephrotoxins  Monitor creatinine, urine output, electrolytes closely    Electrolyte abnormality- (present on admission)  Assessment & Plan  Replete and monitor low potassium and phosphorus levels    Hypertension- (present on admission)  Assessment & Plan  Resume outpatient lisinopril this morning  Prn labetalol for goal SBP <160    Gastroesophageal reflux disease without esophagitis- (present on admission)  Assessment & Plan  Tums prn    Urinary retention- (present on admission)  Assessment & Plan  Follow-up on STI results  Remove Cordero catheter  Continue Flomax      Plan  Monitor closely on current insulin regimen including Lantus, regular insulin per sliding scale,  Transition to Xarelto for DVT prophylaxis  Continue blood pressure control, lisinopril  GERD treatment, omeprazole,  Monitor urinary retention on Flomax  A.m. labs  See orders  Continue gentle IV hydration  Patient is has a high medical complexity, complex decision making and is at high risk for complication, morbidity, and mortality.  I spent 61 minutes, reviewing the chart,  obtaining and/or reviewing separately obtained history. Performing a medically appropriate examination and evaluation.  Counseling and educating the patient. Ordering and reviewing medications, tests, or procedures.   Documenting clinical information in EPIC. Independently interpreting results and communicating results to patient. Discussing future disposition of care with patient, RN and case management.    Thank you for consulting with us, we will follow closely while the patient is hospitalized      Please note that this dictation was created using voice recognition software. I have made every reasonable attempt to correct obvious errors, but I expect that there are errors of grammar and possibly context that I did not discover before finalizing the note.

## 2024-09-01 NOTE — DIETARY
Nutrition Services: Diabetes Education Consult   Day 1 of admit.  Gerber Langston is a 38 y.o. male with admitting DX of DKA, type 1, not at goal    RD received consult for consistent CHO diet education; MST score of 2 (unsure wt loss in <1 week), no poor PO reported. Wt has been stable per chart review. Of note, pt admitted with abdominal pain, nausea and vomiting. Per chart review, pt was without insulin x 3 days.     RD included nutrition recommendations and tips in dietary discharge instructions that align with pt's current dx. Outpatient resources included to encourage follow-up with [facility] for continued support after discharge.     No other education needs identified at this time. Please consult RD as needed for supplemental education or at request of pt.

## 2024-09-01 NOTE — ASSESSMENT & PLAN NOTE
Prerenal - improving, nonoliguric  Avoid nephrotoxins  Monitor creatinine, urine output, electrolytes closely

## 2024-09-01 NOTE — CONSULTS
Critical Care Consultation    Date of consult: 8/31/2024    Referring Physician  Lizzie Penaloza M.D.    Reason for Consultation  DKA    History of Presenting Illness  38 y.o. male with a PMHx DM, HTN, GERD, polysubstance abuse, Hep C who presented 8/31/2024 with abdominal pain, nausea, and vomiting consistent with prior episodes of DKA. He was released from alf 3 days ago after 10 months and has not had his insulin. He picked up his insulin today but he already developed symptoms of DKA. In the ER his labs were consistent with DKA, he was admitted to the ICU on an insulin gtt. He endorses difficulty urinating which has occurred before in the past, denies syncope, chest pain, infectious s/s.     Code Status  Full Code    Review of Systems  Review of Systems   Unable to perform ROS: Critical illness       Past Medical History   has a past medical history of Diabetes (HCC), Hep C w/ coma, chronic (01/01/2003), and Hepatitis C.    Surgical History   has a past surgical history that includes hernia repair (Left, 4/2016).    Family History  family history is not on file.    Social History   reports that he has been smoking cigarettes. He has never used smokeless tobacco. He reports current alcohol use of about 1.8 oz of alcohol per week. He reports that he does not currently use drugs.    Medications  Home Medications       Reviewed by Susan Campos (Pharmacy Tech) on 08/31/24 at 1642  Med List Status: Complete     Medication Last Dose Status   Alcohol Swabs Supplies Active   Blood Glucose Monitoring Suppl (TRUE METRIX METER) w/Device Kit Supplies Active   glucose blood (TRUE METRIX BLOOD GLUCOSE TEST) strip Supplies Active   insulin glargine (LANTUS SOLOSTAR) 100 UNIT/ML Solution Pen-injector injection 8/28/2024 Active   insulin lispro (HUMALOG,ADMELOG) 100 UNIT/ML Solution Pen-injector injection PEN 8/28/2024 Active   Insulin Pen Needle 32 G x 4 mm (BD PEN NEEDLE MAGDALENA U/F) Supplies Active   lisinopril  (PRINIVIL) 5 MG Tab Unk Active   omeprazole (PRILOSEC) 20 MG Tablet Delayed Response delayed-release tablet Unk Active   TechLite Lancets Misc Supplies Active                  Current Facility-Administered Medications   Medication Dose Route Frequency Provider Last Rate Last Admin    D10 1/2 NS infusion   Intravenous Continuous Primo Jasmine M.D. MD ALERT-PHARMACY TO CONSULT FOR DKA MONITORING 1 Each  1 Each Other PRN Primo Jasmine M.D.        magnesium sulfate IVPB premix 2 g  2 g Intravenous Once PRN Primo Jasmine M.D.        Or    magnesium sulfate IVPB premix 4 g  4 g Intravenous Once PRN Primo Jasmine M.D.        potassium phosphate 30 mmol in  mL ivpb  30 mmol Intravenous Once PRN Primo Jasmine M.D.        Or    sodium phosphate 30 mmol in 1/2  mL ivpb  30 mmol Intravenous Once PRN Primo Jasmine M.D.        Adult DKA potassium(K+) replacement scale  1 Each Intravenous Q4HRS Primo Jasmine M.D.   1 Each at 08/31/24 1800    acetaminophen (Tylenol) tablet 650 mg  650 mg Oral Q6HRS PRN Primo Jasmine M.D.        senna-docusate (Pericolace Or Senokot S) 8.6-50 MG per tablet 2 Tablet  2 Tablet Oral BID Primo Jasmine M.D.        And    polyethylene glycol/lytes (Miralax) Packet 1 Packet  1 Packet Oral QDAY PRN Primo Jasmine M.D.        lactated ringers infusion   Intravenous Continuous Primo Jasmine M.D. 100 mL/hr at 08/31/24 1918 New Bag at 08/31/24 1918    D5LR infusion   Intravenous Continuous Primo Jasmine M.D.        heparin injection 5,000 Units  5,000 Units Subcutaneous Q8HRS Primo Jasmine M.D.        labetalol (Normodyne/Trandate) injection 10 mg  10 mg Intravenous Q4HRS PRN Primo Jasmine M.D.        ondansetron (Zofran) syringe/vial injection 4 mg  4 mg Intravenous Q4HRS PRN Primo Jasmine M.D.        ondansetron (Zofran ODT) dispertab 4 mg  4 mg Oral Q4HRS PRN Primo Jasmine M.D.        promethazine (Phenergan) tablet 12.5-25 mg   12.5-25 mg Oral Q4HRS PRN Primo Jasmine M.D.        promethazine (Phenergan) suppository 12.5-25 mg  12.5-25 mg Rectal Q4HRS PRN Primo Jasmine M.D.        prochlorperazine (Compazine) injection 5-10 mg  5-10 mg Intravenous Q4HRS PRN Primo Jasmine M.D.        insulin regular (HumuLIN R/NovoLIN R) 100 Units in  mL Infusion for DKA  4 Units/hr Intravenous Continuous Primo Jasmine M.D. 6 mL/hr at 08/31/24 2034 6 Units/hr at 08/31/24 2034    tamsulosin (Flomax) capsule 0.4 mg  0.4 mg Oral AFTER BREAKFAST Primo Jasmine M.D.   0.4 mg at 08/31/24 1915    potassium chloride (KCL) ivpb 10 mEq  10 mEq Intravenous Q HOUR Primo Jasmine M.D. 100 mL/hr at 08/31/24 2048 10 mEq at 08/31/24 2048    calcium carbonate (Tums) chewable tab 500 mg  500 mg Oral BID PRN Primo Jasmine M.D.           Allergies  No Known Allergies    Vital Signs last 24 hours  Temp:  [36.9 °C (98.5 °F)-37.3 °C (99.2 °F)] 37.3 °C (99.2 °F)  Pulse:  [] (P) 105  Resp:  [10-23] (P) 10  BP: (134-167)/() (P) 152/81  SpO2:  [96 %-98 %] (P) 97 %    Physical Exam  Physical Exam  Vitals and nursing note reviewed.   HENT:      Head: Normocephalic and atraumatic.      Nose: Nose normal.      Mouth/Throat:      Mouth: Mucous membranes are moist.   Eyes:      Pupils: Pupils are equal, round, and reactive to light.   Cardiovascular:      Rate and Rhythm: Tachycardia present.      Pulses: Normal pulses.   Pulmonary:      Effort: Pulmonary effort is normal. No respiratory distress.   Abdominal:      General: Abdomen is flat. There is no distension.      Palpations: Abdomen is soft.      Tenderness: There is no abdominal tenderness. There is no guarding or rebound.   Musculoskeletal:      Right lower leg: No edema.      Left lower leg: No edema.   Skin:     General: Skin is warm and dry.      Capillary Refill: Capillary refill takes less than 2 seconds.   Neurological:      General: No focal deficit present.      Mental Status: He  is alert and oriented to person, place, and time.      Motor: No weakness.   Psychiatric:         Mood and Affect: Mood normal.         Fluids    Intake/Output Summary (Last 24 hours) at 8/31/2024 2126  Last data filed at 8/31/2024 2000  Gross per 24 hour   Intake 1057.49 ml   Output 1600 ml   Net -542.51 ml       Laboratory  Recent Results (from the past 48 hour(s))   POCT glucose device results    Collection Time: 08/31/24  3:39 PM   Result Value Ref Range    POC Glucose, Blood 599 (HH) 65 - 99 mg/dL   CBC WITH DIFFERENTIAL    Collection Time: 08/31/24  3:46 PM   Result Value Ref Range    WBC 16.3 (H) 4.8 - 10.8 K/uL    RBC 5.65 4.70 - 6.10 M/uL    Hemoglobin 16.6 14.0 - 18.0 g/dL    Hematocrit 48.2 42.0 - 52.0 %    MCV 85.3 81.4 - 97.8 fL    MCH 29.4 27.0 - 33.0 pg    MCHC 34.4 32.3 - 36.5 g/dL    RDW 40.0 35.9 - 50.0 fL    Platelet Count 533 (H) 164 - 446 K/uL    MPV 11.4 9.0 - 12.9 fL    Neutrophils-Polys 87.20 (H) 44.00 - 72.00 %    Lymphocytes 6.70 (L) 22.00 - 41.00 %    Monocytes 4.20 0.00 - 13.40 %    Eosinophils 0.00 0.00 - 6.90 %    Basophils 0.20 0.00 - 1.80 %    Immature Granulocytes 1.70 (H) 0.00 - 0.90 %    Nucleated RBC 0.00 0.00 - 0.20 /100 WBC    Neutrophils (Absolute) 14.26 (H) 1.82 - 7.42 K/uL    Lymphs (Absolute) 1.09 1.00 - 4.80 K/uL    Monos (Absolute) 0.68 0.00 - 0.85 K/uL    Eos (Absolute) 0.00 0.00 - 0.51 K/uL    Baso (Absolute) 0.03 0.00 - 0.12 K/uL    Immature Granulocytes (abs) 0.28 (H) 0.00 - 0.11 K/uL    NRBC (Absolute) 0.00 K/uL   COMP METABOLIC PANEL    Collection Time: 08/31/24  3:46 PM   Result Value Ref Range    Sodium 138 135 - 145 mmol/L    Potassium 4.3 3.6 - 5.5 mmol/L    Chloride 85 (L) 96 - 112 mmol/L    Co2 12 (L) 20 - 33 mmol/L    Anion Gap 41.0 (H) 7.0 - 16.0    Glucose 668 (HH) 65 - 99 mg/dL    Bun 45 (H) 8 - 22 mg/dL    Creatinine 1.56 (H) 0.50 - 1.40 mg/dL    Calcium 10.3 8.5 - 10.5 mg/dL    Correct Calcium 9.6 8.5 - 10.5 mg/dL    AST(SGOT) 10 (L) 12 - 45 U/L     ALT(SGPT) 13 2 - 50 U/L    Alkaline Phosphatase 169 (H) 30 - 99 U/L    Total Bilirubin 0.7 0.1 - 1.5 mg/dL    Albumin 4.9 3.2 - 4.9 g/dL    Total Protein 9.6 (H) 6.0 - 8.2 g/dL    Globulin 4.7 (H) 1.9 - 3.5 g/dL    A-G Ratio 1.0 g/dL   LIPASE    Collection Time: 08/31/24  3:46 PM   Result Value Ref Range    Lipase 22 11 - 82 U/L   ESTIMATED GFR    Collection Time: 08/31/24  3:46 PM   Result Value Ref Range    GFR (CKD-EPI) 58 (A) >60 mL/min/1.73 m 2   BETA-HYDROXYBUTYRIC ACID    Collection Time: 08/31/24  3:46 PM   Result Value Ref Range    beta-Hydroxybutyric Acid >8.00 (H) 0.02 - 0.27 mmol/L   HEMOGLOBIN A1C    Collection Time: 08/31/24  3:46 PM   Result Value Ref Range    Glycohemoglobin 10.4 (H) 4.0 - 5.6 %    Est Avg Glucose 252 mg/dL   OSMOLALITY SERUM    Collection Time: 08/31/24  3:46 PM   Result Value Ref Range    Osmolality Serum 359 (H) 278 - 298 mOsm/kg H2O   MAGNESIUM    Collection Time: 08/31/24  3:46 PM   Result Value Ref Range    Magnesium 2.9 (H) 1.5 - 2.5 mg/dL   PHOSPHORUS    Collection Time: 08/31/24  3:46 PM   Result Value Ref Range    Phosphorus 6.6 (H) 2.5 - 4.5 mg/dL   URINALYSIS    Collection Time: 08/31/24  5:37 PM    Specimen: Urine, Clean Catch   Result Value Ref Range    Color Yellow     Character Clear     Specific Gravity 1.025 <1.035    Ph 6.0 5.0 - 8.0    Glucose 500 (A) Negative mg/dL    Ketones >=80 (A) Negative mg/dL    Protein Negative Negative mg/dL    Bilirubin Negative Negative    Urobilinogen, Urine 0.2 Negative    Nitrite Negative Negative    Leukocyte Esterase Negative Negative    Occult Blood Negative Negative    Micro Urine Req see below    Chlamydia/GC, PCR (Urine)    Collection Time: 08/31/24  5:37 PM    Specimen: Urine   Result Value Ref Range    Source Urine    KETONES-URINE QUAL(ACETONE URINE QUAL)    Collection Time: 08/31/24  5:37 PM   Result Value Ref Range    Ketones Moderate (A) Negative   VENOUS BLOOD GAS    Collection Time: 08/31/24  5:40 PM   Result Value Ref  Range    Venous Bg Ph 7.22 (L) 7.31 - 7.45    Venous Bg Ph Temp Corrected 7.22 (L) 7.31 - 7.45    Venous Bg Pco2 31.3 (L) 41.0 - 51.0 mmHg    Venous Bg Pco2 Temp Corrected 31.2 (L) 41.0 - 51.0 mmHg    Venous Bg Po2 43.2 (H) 25.0 - 40.0 mmHg    Venous Bg Po2 Temp Corrected 42.9 (H) 25.0 - 40.0 mmHg    Venous Bg O2 Saturation 71.1 %    Venous Bg Hco3 13 (L) 24 - 28 mmol/L    Venous Bg Base Excess -14 mmol/L    Body Temp 36.9 Centigrade    O2 Therapy -    POCT glucose device results    Collection Time: 08/31/24  6:47 PM   Result Value Ref Range    POC Glucose, Blood 493 (HH) 65 - 99 mg/dL   POCT glucose device results    Collection Time: 08/31/24  7:09 PM   Result Value Ref Range    POC Glucose, Blood 487 (HH) 65 - 99 mg/dL   POCT glucose device results    Collection Time: 08/31/24  8:32 PM   Result Value Ref Range    POC Glucose, Blood 444 (HH) 65 - 99 mg/dL       Imaging  No orders to display       Assessment/Plan  * DKA, type 1, not at goal (HCC)- (present on admission)  Assessment & Plan  Lacked access to insulin after getting out of skilled nursing    DKA protocol insulin infusion  Q1 hour blood glucose and q4 hour BMP  Crystalloid resuscitation   Electrolyte replacement  Transition to subcutaneous insulin when bicarb > 17 and AG < 12     Hypertension- (present on admission)  Assessment & Plan  Reintroduce oral antihypertensives when appropriate      Gastroesophageal reflux disease without esophagitis- (present on admission)  Assessment & Plan  Tums prn    Urinary retention- (present on admission)  Assessment & Plan  UA and STI check ordered by ERP    Cordero catheter  F/U UA and STI labs  Reassess   Flomax         Discussed patient condition and risk of morbidity and/or mortality with Family, RN, RT, Pharmacy, Charge nurse / hot rounds, and Patient.    The patient remains critically ill.  Critical care time = 61 minutes in directly providing and coordinating critical care and extensive data review.  No time overlap and  excludes procedures.

## 2024-09-01 NOTE — ASSESSMENT & PLAN NOTE
Lacked access to insulin after getting out of senior care    DKA protocol insulin infusion  Q1 hour blood glucose and q4 hour BMP  Crystalloid resuscitation   Electrolyte replacement  Transition to subcutaneous insulin when bicarb > 17 and AG < 12

## 2024-09-01 NOTE — ED PROVIDER NOTES
"  ER Provider Note    Scribed for Lizzie Penaloza M.d. by Swetha Galindo. 8/31/2024  5:16 PM    Primary Care Provider: Pcp Pt States None    CHIEF COMPLAINT  Chief Complaint   Patient presents with    Blood in Urine     Since last night. Denies pain with urination. Reports frequency. Patient states \"since this morning, It's hard to pee.\" Last urination last night.     Abdominal Pain     Abd pain/N/V x 2 days. Type 1 DM - last use of insulin 3 days ago in half-way. FSBS in triage 599.      LIMITATION TO HISTORY   Select: : None    HPI/ROS  OUTSIDE HISTORIAN(S):  Family at bedside who adds the patient hasn't taken his insulin in a few days.    Gerber Langston is a 38 y.o. male with a history of type I diabetes who presents to the ED for abdominal pain and difficulty urinating onset last night. The patient describes his pain as \"burning.\" He endorses additional dizziness when standing, nausea, and blood in his urine. His mother notes he hasn't taken his insulin in three days since getting out of half-way. The patient denies any history of DKA.    PAST MEDICAL HISTORY  Past Medical History:   Diagnosis Date    Diabetes (HCC)     type 1    Hep C w/ coma, chronic 01/01/2003    Hepatitis C        SURGICAL HISTORY  Past Surgical History:   Procedure Laterality Date    HERNIA REPAIR Left 4/2016    inguinal       FAMILY HISTORY  None noted     SOCIAL HISTORY   reports that he has been smoking cigarettes. He has never used smokeless tobacco. He reports current alcohol use of about 1.8 oz of alcohol per week. He reports that he does not currently use drugs.    CURRENT MEDICATIONS  Previous Medications    ALCOHOL SWABS    Wipe site with prep pad prior to injection.    BLOOD GLUCOSE MONITORING SUPPL (TRUE METRIX METER) W/DEVICE KIT    Test as directed    GLUCOSE BLOOD (TRUE METRIX BLOOD GLUCOSE TEST) STRIP    Use to test 3 times daily as directd    INSULIN GLARGINE (LANTUS SOLOSTAR) 100 UNIT/ML SOLUTION PEN-INJECTOR INJECTION    Inject " "22 Units under the skin every morning.    INSULIN LISPRO (HUMALOG,ADMELOG) 100 UNIT/ML SOLUTION PEN-INJECTOR INJECTION PEN    Inject 9 Units under the skin 3 times a day before meals. For glucose: 70-150mg/dL=0Units; 151-200mg/dL=2Units ;201-250mg/dL=3Units; 251-300mg/dL=5Units; 301-350mg/dL=6Units; 351-400mg/dL=8Units  >400 mg/dL=9Units; >400 x2 consecutive=9Units & call MD  if FSBG<or= to 70 mg/dL, give glucose & call MD    INSULIN PEN NEEDLE 32 G X 4 MM (BD PEN NEEDLE MAGDALENA U/F)    Use for injections 3 times daily    LISINOPRIL (PRINIVIL) 5 MG TAB    Take 1 Tablet by mouth every day.    OMEPRAZOLE (PRILOSEC) 20 MG TABLET DELAYED RESPONSE DELAYED-RELEASE TABLET    Take 1 Tablet by mouth every day.    TECHLITE LANCETS MISC    Use to test 3 times daily       ALLERGIES  Patient has no known allergies.    PHYSICAL EXAM  BP (!) 143/97   Pulse (!) 118   Temp 36.9 °C (98.5 °F) (Temporal)   Resp 15   Ht 1.626 m (5' 4\")   Wt 72.6 kg (160 lb)   SpO2 96%   BMI 27.46 kg/m²     Constitutional: Alert in no apparent distress.  HENT: No signs of trauma, Bilateral external ears normal, Nose normal, Dry mucous membranes.   Eyes: Pupils are equal and reactive, Conjunctiva normal, Non-icteric.   Neck: No stridor.   Cardiovascular: Tachycardic rate and regular rhythm, no murmurs.   Thorax & Lungs: Normal breath sounds, No respiratory distress, No wheezing, No chest tenderness.   Abdomen: Bowel sounds normal, Soft, No tenderness, No masses, No peritoneal signs.  Skin: Warm, Dry, No erythema, No rash.   Musculoskeletal:  No major deformities noted.  Neurologic: Alert, moving all extremities without difficulty, no focal deficits.     DIAGNOSTIC STUDIES & PROCEDURES    Labs:   Results for orders placed or performed during the hospital encounter of 08/31/24   CBC WITH DIFFERENTIAL   Result Value Ref Range    WBC 16.3 (H) 4.8 - 10.8 K/uL    RBC 5.65 4.70 - 6.10 M/uL    Hemoglobin 16.6 14.0 - 18.0 g/dL    Hematocrit 48.2 42.0 - 52.0 % "    MCV 85.3 81.4 - 97.8 fL    MCH 29.4 27.0 - 33.0 pg    MCHC 34.4 32.3 - 36.5 g/dL    RDW 40.0 35.9 - 50.0 fL    Platelet Count 533 (H) 164 - 446 K/uL    MPV 11.4 9.0 - 12.9 fL    Neutrophils-Polys 87.20 (H) 44.00 - 72.00 %    Lymphocytes 6.70 (L) 22.00 - 41.00 %    Monocytes 4.20 0.00 - 13.40 %    Eosinophils 0.00 0.00 - 6.90 %    Basophils 0.20 0.00 - 1.80 %    Immature Granulocytes 1.70 (H) 0.00 - 0.90 %    Nucleated RBC 0.00 0.00 - 0.20 /100 WBC    Neutrophils (Absolute) 14.26 (H) 1.82 - 7.42 K/uL    Lymphs (Absolute) 1.09 1.00 - 4.80 K/uL    Monos (Absolute) 0.68 0.00 - 0.85 K/uL    Eos (Absolute) 0.00 0.00 - 0.51 K/uL    Baso (Absolute) 0.03 0.00 - 0.12 K/uL    Immature Granulocytes (abs) 0.28 (H) 0.00 - 0.11 K/uL    NRBC (Absolute) 0.00 K/uL   COMP METABOLIC PANEL   Result Value Ref Range    Sodium 138 135 - 145 mmol/L    Potassium 4.3 3.6 - 5.5 mmol/L    Chloride 85 (L) 96 - 112 mmol/L    Co2 12 (L) 20 - 33 mmol/L    Anion Gap 41.0 (H) 7.0 - 16.0    Glucose 668 (HH) 65 - 99 mg/dL    Bun 45 (H) 8 - 22 mg/dL    Creatinine 1.56 (H) 0.50 - 1.40 mg/dL    Calcium 10.3 8.5 - 10.5 mg/dL    Correct Calcium 9.6 8.5 - 10.5 mg/dL    AST(SGOT) 10 (L) 12 - 45 U/L    ALT(SGPT) 13 2 - 50 U/L    Alkaline Phosphatase 169 (H) 30 - 99 U/L    Total Bilirubin 0.7 0.1 - 1.5 mg/dL    Albumin 4.9 3.2 - 4.9 g/dL    Total Protein 9.6 (H) 6.0 - 8.2 g/dL    Globulin 4.7 (H) 1.9 - 3.5 g/dL    A-G Ratio 1.0 g/dL   LIPASE   Result Value Ref Range    Lipase 22 11 - 82 U/L   ESTIMATED GFR   Result Value Ref Range    GFR (CKD-EPI) 58 (A) >60 mL/min/1.73 m 2   POCT glucose device results   Result Value Ref Range    POC Glucose, Blood 599 (HH) 65 - 99 mg/dL      All labs reviewed by me.       COURSE & MEDICAL DECISION MAKING    ED Observation Status? No; Patient does not meet criteria for ED Observation.     5:16 PM - Patient seen and evaluated at bedside. Ordered UA w/ culture, Lipase, CMP, CBC w/ diff, Chlamydia/GC PCR, Osmolality serum,  Venous blood gas, Hemoglobin A1C, Ketones-Urine qual, Beta-Hydroxybutyric acid, Phosphorus, and Magnesium to evaluate. He understands and agrees to the plan of care. Differential diagnoses include but are not limited to: DKA. I will page the Hospitalist.    5:46 PM - Patient was reevaluated at bedside. Discussed lab results with the patient and informed them he has DKA and will require hospitalization. I discussed the patient's case and the above findings with Dr. Jasmine (Intensivist) who agrees to evaluate the patient for hospitalization. Patient's care was transferred at this time    CRITICAL CARE  The very real possibilty of a deterioration of this patient's condition required the highest level of my preparedness for sudden, emergent intervention.  I provided critical care services, which included medication orders, frequent reevaluations of the patient's condition and response to treatment, ordering and reviewing test results, and discussing the case with various consultants.  The critical care time associated with the care of the patient was 30 minutes. Review chart for interventions. This time is exclusive of any other billable procedures.      INITIAL ASSESSMENT AND PLAN  Care Narrative: This is a 38-year-old gentleman with a history of type 1 diabetes presenting having not had any of his medications since leaving present 3 days ago with an elevated sugar.  He is tachycardic he is thirsty he says he cannot urinate and is requesting a Cordero catheter.  His sugar was over 500 on Accu-Chek.  Labs show evidence of DKA with a bicarb of 12 and anion gap of 41 and a significantly elevated glucose.  He was given aggressive fluid resuscitation here in the emergency department.  He has ketones in his urine he is acidemic on his VBG.  No evidence of infection his urinalysis I did send for chlamydia and gonorrhea given the fact that he said he could not pee and was recently in senior living.  His A1c is quite elevated.  He will  need ICU level hospitalization for an insulin drip.  I spoke with Dr. Jasmine, intensivist who is agreeable to consult for hospitalization.  Patient was hospitalized in critical condition.      Hydration: Based on the patient's presentation of Dehydration and DKA the patient was given IV fluids. IV Hydration was used because oral hydration was not adequate alone. Upon recheck following hydration, the patient was improved.               DISPOSITION AND DISCUSSIONS  I have discussed management of the patient with the following physicians and MAGGY's: Dr. Jasmine (Hospitalist)    Discussion of management with other [facility] or appropriate source(s): None         DISPOSITION:  Patient will be hospitalized by Dr. Jasmine in critical condition.     FINAL IMPRESSION   1. Diabetic ketoacidosis without coma associated with type 1 diabetes mellitus (HCC)    2. CCT: 30 minutes    Swetha ROWE (Scribe), am scribing for, and in the presence of, Lizzie Penaloza M.D..    Electronically signed by: Swetha Galindo (Scribe), 8/31/2024    ILizzie M.D. personally performed the services described in this documentation, as scribed by Swetha Galindo in my presence, and it is both accurate and complete.    The note accurately reflects work and decisions made by me.  Lizzie Penaloza M.D.  8/31/2024  10:35 PM

## 2024-09-01 NOTE — ED NOTES
Pt's ICU room assignment changed, pt remains in ER, awaiting new placement, no changes in pt condition

## 2024-09-01 NOTE — CARE PLAN
The patient is Stable - Low risk of patient condition declining or worsening    Shift Goals  Clinical Goals: Transition from DKA insulin gtt, mobilize, comfort, manage pain/nausea prn  Patient Goals: Rest  Family Goals: CORIE    Progress made toward(s) clinical / shift goals:    Problem: Knowledge Deficit - Standard  Goal: Patient and family/care givers will demonstrate understanding of plan of care, disease process/condition, diagnostic tests and medications  Description: Target End Date:  1-3 days or as soon as patient condition allows    Document in Patient Education    1.  Patient and family/caregiver oriented to unit, equipment, visitation policy and means for communicating concern  2.  Complete/review Learning Assessment  3.  Assess knowledge level of disease process/condition, treatment plan, diagnostic tests and medications  4.  Explain disease process/condition, treatment plan, diagnostic tests and medications  Outcome: Progressing  Note: Discuss POC with patient  Address questions and concerns, escalate as appropriate  Check for pt understanding of POC       Problem: Pain - Standard  Goal: Alleviation of pain or a reduction in pain to the patient’s comfort goal  Description: Target End Date:  Prior to discharge or change in level of care    Document on Vitals flowsheet    1.  Document pain using the appropriate pain scale per order or unit policy  2.  Educate and implement non-pharmacologic comfort measures (i.e. relaxation, distraction, massage, cold/heat therapy, etc.)  3.  Pain management medications as ordered  4.  Reassess pain after pain med administration per policy  5.  If opiods administered assess patient's response to pain medication is appropriate per POSS sedation scale  6.  Follow pain management plan developed in collaboration with patient and interdisciplinary team (including palliative care or pain specialists if applicable)  Outcome: Progressing  Note: Assess pain per unit standard and as  needed  Provide comfort measures as appropriate; see flowsheet  Administer medications as ordered       Problem: Hemodynamics  Goal: Patient's hemodynamics, fluid balance and neurologic status will be stable or improve  Description: Target End Date:  Prior to discharge or change in level of care    Document on Assessment and I/O flowsheet templates    1.  Monitor vital signs, pulse oximetry and cardiac monitor per provider order and/or policy  2.  Maintain blood pressure per provider order  3.  Hemodynamic monitoring per provider order  4.  Manage IV fluids and IV infusions  5.  Monitor intake and output  6.  Daily weights per unit policy or provider order  7.  Assess peripheral pulses and capillary refill  8.  Assess color and body temperature  9.  Position patient for maximum circulation/cardiac output  10. Monitor for signs/symptoms of excessive bleeding  11. Assess mental status, restlessness and changes in level of consciousness  12. Monitor temperature and report fever or hypothermia to provider immediately. Consideration of targeted temperature management.  Outcome: Progressing  Note: Monitor VS, respiratory status, and I&O  Assess circulation in all extremities  Assess LOC  Report abnormal findings to provider         Patient is not progressing towards the following goals:

## 2024-09-01 NOTE — PROGRESS NOTES
Pt transitioning off DKA insulin gtt, BGL assessed @ 97, up 5 from previous. Pt previously receiving off-protocol insulin gtt @ 5units/hr, notified resident of updated BGL and ordered to maintain gtt at current rate until off in 1 hour.

## 2024-09-01 NOTE — PROGRESS NOTES
Pt arrived to room. POC discussed. No acute distress noted. Pt denies further needs at this time.

## 2024-09-01 NOTE — PROGRESS NOTES
2149 pt blood glucose 331 from 444 decrease by 113. Insulin gtt titrated per protocol and notified Dr. Cody. Per Dr. Cody, keep fluids and insulin rate as is and check the next blood glucose level, notify if blood glucose level decrease >100.

## 2024-09-01 NOTE — PROGRESS NOTES
"Critical Care Progress Note    Date of admission  8/31/2024    Chief Complaint  38 y.o. male admitted 8/31/2024 with DKA    Hospital Course  \"38 y.o. male with a PMHx DM, HTN, GERD, polysubstance abuse, Hep C who presented 8/31/2024 with abdominal pain, nausea, and vomiting consistent with prior episodes of DKA. He was released from senior care 3 days ago after 10 months and has not had his insulin. He picked up his insulin today but he already developed symptoms of DKA. In the ER his labs were consistent with DKA, he was admitted to the ICU on an insulin gtt. He endorses difficulty urinating which has occurred before in the past, denies syncope, chest pain, infectious s/s. \" - Dr. Best 8/31    Interval Problem Update  Reviewed last 24 hour events:   - remains on insulin gtt @ 8 units/hr   - AG closed, bicarb improved, glucose down   - sinus tach, -150   - AF, improving WBC   - Hgb down to 13, plts normalized   - improving MARY   - low K, phos   - AAOx4    Review of Systems  Review of Systems   Constitutional:  Negative for chills, fever and malaise/fatigue.   HENT:  Negative for congestion and sore throat.    Eyes:  Negative for blurred vision.   Respiratory:  Negative for cough, sputum production and shortness of breath.    Cardiovascular:  Negative for chest pain, palpitations and leg swelling.   Gastrointestinal:  Negative for abdominal pain, nausea and vomiting.   Genitourinary:  Negative for dysuria.   Musculoskeletal:  Negative for back pain and myalgias.   Skin:  Negative for rash.   Neurological:  Negative for dizziness, sensory change, speech change and headaches.   Endo/Heme/Allergies:  Does not bruise/bleed easily.   Psychiatric/Behavioral:  Negative for depression. The patient is not nervous/anxious.    All other systems reviewed and are negative.       Vital Signs for last 24 hours   Temp:  [36.9 °C (98.5 °F)-37.3 °C (99.2 °F)] 37.3 °C (99.2 °F)  Pulse:  [] 96  Resp:  [10-23] 12  BP: " (119-167)/() 119/76  SpO2:  [94 %-98 %] 96 %    Respiratory Information for the last 24 hours   Room air    Physical Exam   Physical Exam  Vitals and nursing note reviewed.   Constitutional:       General: He is not in acute distress.     Appearance: He is well-developed.   HENT:      Head: Normocephalic and atraumatic.      Nose: Nose normal.      Mouth/Throat:      Mouth: Mucous membranes are dry.      Pharynx: Oropharynx is clear. No oropharyngeal exudate.      Comments: Poor dentition  Eyes:      General: No scleral icterus.     Conjunctiva/sclera: Conjunctivae normal.      Pupils: Pupils are equal, round, and reactive to light.   Neck:      Vascular: No JVD.   Cardiovascular:      Rate and Rhythm: Regular rhythm. Tachycardia present.      Pulses: Normal pulses.      Heart sounds: Normal heart sounds. No murmur heard.  Pulmonary:      Effort: Pulmonary effort is normal. No respiratory distress.      Breath sounds: Normal breath sounds. No stridor. No wheezing.   Abdominal:      General: Bowel sounds are normal. There is no distension.      Palpations: Abdomen is soft.      Tenderness: There is no abdominal tenderness. There is no guarding.   Genitourinary:     Comments: Cordero catheter in place  Musculoskeletal:         General: No tenderness.      Cervical back: Neck supple. No tenderness.      Right lower leg: No edema.      Left lower leg: No edema.   Skin:     General: Skin is warm and dry.      Capillary Refill: Capillary refill takes less than 2 seconds.      Coloration: Skin is not pale.      Comments: Multiple tattoos throughout   Neurological:      General: No focal deficit present.      Mental Status: He is alert and oriented to person, place, and time.      Cranial Nerves: No cranial nerve deficit.      Sensory: No sensory deficit.   Psychiatric:         Mood and Affect: Mood normal.         Behavior: Behavior normal.         Thought Content: Thought content normal.         Medications  Current  Facility-Administered Medications   Medication Dose Route Frequency Provider Last Rate Last Admin    insulin GLARGINE (Lantus,Semglee) injection  22 Units Subcutaneous QAM INSULIN Jeremy M Gonda, M.D.        insulin regular (HumuLIN R,NovoLIN R) injection  3-14 Units Subcutaneous 4X/DAY ACHS Jeremy M Gonda, M.D.        And    dextrose 50% (D50W) injection 25 g  25 g Intravenous Q15 MIN PRN Jeremy M Gonda, M.D.        D10 1/2 NS infusion   Intravenous Continuous Jeremy M Gonda, M.D. 100 mL/hr at 09/01/24 0453 New Bag at 09/01/24 0453    potassium phosphate 30 mmol in  mL ivpb  30 mmol Intravenous Once PRN Primo Jasmine M.D. 125 mL/hr at 09/01/24 0354 30 mmol at 09/01/24 0354    acetaminophen (Tylenol) tablet 650 mg  650 mg Oral Q6HRS PRN Primo Jasmine M.D.        senna-docusate (Pericolace Or Senokot S) 8.6-50 MG per tablet 2 Tablet  2 Tablet Oral BID Primo Jasmine M.D.   2 Tablet at 09/01/24 0553    And    polyethylene glycol/lytes (Miralax) Packet 1 Packet  1 Packet Oral QDAY PRN Primo Jasmine M.D.        heparin injection 5,000 Units  5,000 Units Subcutaneous Q8HRS Primo Jasmine M.D.   5,000 Units at 09/01/24 0552    labetalol (Normodyne/Trandate) injection 10 mg  10 mg Intravenous Q4HRS PRN Primo Jasmine M.D.        ondansetron (Zofran) syringe/vial injection 4 mg  4 mg Intravenous Q4HRS PRN Primo Jasmine M.D.        ondansetron (Zofran ODT) dispertab 4 mg  4 mg Oral Q4HRS PRN Primo Jasmine M.D.        promethazine (Phenergan) tablet 12.5-25 mg  12.5-25 mg Oral Q4HRS PRN Primo Jasmine M.D.        promethazine (Phenergan) suppository 12.5-25 mg  12.5-25 mg Rectal Q4HRS PRN Primo Jasmine M.D.        prochlorperazine (Compazine) injection 5-10 mg  5-10 mg Intravenous Q4HRS PRN Primo Jasmine M.D.   5 mg at 09/01/24 0250    insulin regular (HumuLIN R/NovoLIN R) 100 Units in  mL Infusion for DKA  4 Units/hr Intravenous Continuous Jeremy M Gonda, M.D. 8 mL/hr at  09/01/24 0445 8 Units/hr at 09/01/24 0445    tamsulosin (Flomax) capsule 0.4 mg  0.4 mg Oral AFTER BREAKFAST Primo Jasmine M.D.   0.4 mg at 08/31/24 1915    calcium carbonate (Tums) chewable tab 500 mg  500 mg Oral BID PRN Primo Jasmine M.D.   500 mg at 08/31/24 2128       Fluids    Intake/Output Summary (Last 24 hours) at 9/1/2024 0646  Last data filed at 9/1/2024 0600  Gross per 24 hour   Intake 1815.68 ml   Output 2225 ml   Net -409.32 ml       Laboratory  Recent Labs     08/31/24  1740   [id number] -         Recent Labs     08/31/24  1546 08/31/24 2318 09/01/24 0146 09/01/24  0551   SODIUM 138 141 142 144   POTASSIUM 4.3 4.5 4.3 4.2   CHLORIDE 85* 103 106 110   CO2 12* 16* 20 24   BUN 45* 37* 34* 30*   CREATININE 1.56* 1.17 1.11 1.04   MAGNESIUM 2.9*  --  2.5 2.4   PHOSPHORUS 6.6*  --  1.1* 1.8*   CALCIUM 10.3 9.3 9.4 8.9     Recent Labs     08/31/24  1546 08/31/24 2318 09/01/24 0146 09/01/24  0551   ALTSGPT 13  --   --  11   ASTSGOT 10*  --   --  8*   ALKPHOSPHAT 169*  --   --  119*   TBILIRUBIN 0.7  --   --  0.7   LIPASE 22  --   --   --    GLUCOSE 668* 326* 235* 118*     Recent Labs     08/31/24  1546 09/01/24  0551   WBC 16.3* 14.4*   NEUTSPOLYS 87.20* 82.80*   LYMPHOCYTES 6.70* 7.80*   MONOCYTES 4.20 8.90   EOSINOPHILS 0.00 0.00   BASOPHILS 0.20 0.10   ASTSGOT 10* 8*   ALTSGPT 13 11   ALKPHOSPHAT 169* 119*   TBILIRUBIN 0.7 0.7     Recent Labs     08/31/24 1546 09/01/24  0551   RBC 5.65 4.63*   HEMOGLOBIN 16.6 13.3*   HEMATOCRIT 48.2 38.6*   PLATELETCT 533* 379       Imaging  None    Assessment/Plan  * DKA, type 1, not at goal (HCC)- (present on admission)  Assessment & Plan  DKA secondary to medication non-compliance (no signs of infection found)  Transition from insulin drip to long and short acting insulin today  Begin diabetic diet  Discontinue IV fluids once tolerating oral hydration  Continue to monitor glucose, anion gap, bicarb, and electrolytes closely  Diabetic education    Acute kidney  injury (nontraumatic) ([facility])  Assessment & Plan  Prerenal - improving, nonoliguric  Avoid nephrotoxins  Monitor creatinine, urine output, electrolytes closely    Hypertension- (present on admission)  Assessment & Plan  Resume outpatient lisinopril this morning  Prn labetalol for goal SBP <160    Gastroesophageal reflux disease without esophagitis- (present on admission)  Assessment & Plan  Tums prn    Urinary retention- (present on admission)  Assessment & Plan  Follow-up on STI results  Remove Cordero catheter  Continue Flomax    Electrolyte abnormality- (present on admission)  Assessment & Plan  Replete and monitor low potassium and phosphorus levels         VTE:  Lovenox  Ulcer: Not Indicated  Lines: Cordero Catheter  to be removed today    I have performed a physical exam and reviewed and updated ROS and Plan today (9/1/2024). In review of yesterday's note (8/31/2024), there are no changes except as documented above.     Discussed patient condition and risk of morbidity and/or mortality with Hospitalist, RN, RT, Pharmacy, [facility] resident, Charge nurse / hot rounds, and Patient. Critical care services will sign off at this time.  Please call with any questions or concerns.    Please note that this dictation was created using voice recognition software. I have made every reasonable attempt to correct obvious errors, but there may be errors of grammar and possibly content that I did not discover before finalizing the note.

## 2024-09-01 NOTE — DISCHARGE INSTR - DIET
Carbohydrate Counting for People With Diabetes  Foods with carbohydrates make your blood glucose level go up. Learning how to count carbohydrates can help you control your blood glucose levels. First, identify the foods you eat that contain carbohydrates. Then, using the Foods with Carbohydrates chart, determine about how much carbohydrates are in your meals and snacks. Make sure you are eating foods with fiber, protein, and healthy fat along with your carbohydrate foods.    Foods with Carbohydrates  The following table shows carbohydrate foods that have about 15 grams of carbohydrate each. Using measuring cups, spoons, or a food scale when you first begin learning about carbohydrate counting can help you learn about the portion sizes you typically eat.      The following foods have 15 grams carbohydrate each:    Grains  1 slice bread (1 ounce)  1 small tortilla (6-inch size)  ¼ large bagel (1 ounce)  1/3 cup pasta or rice (cooked)  ½ hamburger or hot dog bun (¾ ounce)  ½ cup cooked cereal  ½ to ¾ cup ready-to-eat cereal  2 taco shells (5-inch size)    Fruit  1 small fresh fruit (¾ to 1 cup)  ½ medium banana  17 small grapes (3 ounces)  1 cup melon or berries  ½ cup canned or frozen fruit  2 tablespoons dried fruit (blueberries, cherries, cranberries, raisins)  ½ cup unsweetened fruit juice    Starchy Vegetables  ½ cup cooked beans, peas, corn, potatoes/sweet potatoes  ¼ large baked potato (3 ounces)  1 cup acorn or butternut squash    Snack Foods  3 to 6 crackers  8 potato chips or 13 tortilla chips (¾ ounce to 1 ounce)  3 cups popped popcorn    Dairy  3/4 cup (6 ounces) nonfat plain yogurt, or yogurt with sugar-free sweetener  1 cup milk  1 cup plain rice, soy, coconut or flavored almond milk    Sweets and Desserts  ½ cup ice cream or frozen yogurt  1 tablespoon jam, jelly, pancake syrup, table sugar, or honey  2 tablespoons light pancake syrup  1 inch square of frosted cake or 2 inch square of unfrosted cake  2  small cookies (2/3 ounce each) or ¼ large cookie    Sometimes you’ll have to estimate carbohydrate amounts if you don’t know the exact recipe. One cup of mixed foods like soups can have 1 to 2 carbohydrate servings, while some casseroles might have 2 or more servings of carbohydrate.    Foods that have less than 20 calories in each serving can be counted as “free” foods. Count 1 cup raw vegetables, or ½ cup cooked non-starchy vegetables as “free” foods. If you eat 3 or more servings at one meal, then count them as 1 carbohydrate serving.    Foods without Carbohydrates  Not all foods contain carbohydrates. Meat, some dairy, fats, non-starchy vegetables, and many beverages don’t contain carbohydrate. So when you count carbohydrates, you can generally exclude chicken, pork, beef, fish, seafood, eggs, tofu, cheese, butter, sour cream, avocado, nuts, seeds, olives, mayonnaise, water, black coffee, unsweetened tea, and zero-calorie drinks. Vegetables with no or low carbohydrate include green beans, cauliflower, tomatoes, and onions.    How much carbohydrate should I eat at each meal?  Carbohydrate counting can help you plan your meals and manage your weight. Following are some starting points for carbohydrate intake at each meal. Work with your registered dietitian nutritionist to find the best range that works for your blood glucose and weight.       Women  2 - 3 carb servings (To Lose Weight)  3 - 4 carb servings (To Maintain Weight)    Men  3 - 4 carb servings (To Lose Weight)  4 - 5 carb servings (To Maintain Weight)      Checking your blood glucose after meals will help you know if you need to adjust the timing, type, or number of carbohydrate servings in your meal plan. Achieve and keep a healthy body weight by balancing your food intake and physical activity.         How should I plan my meals?  Plan for half the food on your plate to include non-starchy vegetables, like salad greens, broccoli, or carrots. Try to  eat 3 to 5 servings of non-starchy vegetables every day. Have a protein food at each meal. Protein foods include chicken, fish, meat, eggs, or beans (note that beans contain carbohydrate). These two food groups (non-starchy vegetables and proteins) are low in carbohydrate. If you fill up your plate with these foods, you will eat less carbohydrate but still fill up your stomach. Try to limit your carbohydrate portion to ¼ of the plate.    What fats are healthiest to eat?  Diabetes increases risk for heart disease. To help protect your heart, eat more healthy fats, such as olive oil, nuts, and avocado. Eat less saturated fats like butter, cream, and high-fat meats, like de la torre and sausage. Avoid trans fats, which are in all foods that list “partially hydrogenated oil” as an ingredient.      What should I drink?  Choose drinks that are not sweetened with sugar. The healthiest choices are water, carbonated or seltzer agarwal, and tea and coffee without added sugars.    Sweet drinks will make your blood glucose go up very quickly. One serving of soda or energy drink is ½ cup. It is best to drink these beverages only if your blood glucose is low.    Artificially sweetened, or diet drinks, typically do not increase your blood glucose if they have zero calories in them. Read labels of beverages, as some diet drinks do have carbohydrate and will raise your blood glucose.    Label Reading Tips  Read Nutrition Facts labels to find out how many grams of carbohydrate are in a food you want to eat. Don’t forget: sometimes serving sizes on the label aren’t the same as how much food you are going to eat, so you may need to calculate how much carbohydrate is in the food you are serving yourself.        The Nutrition Facts information is based on a standard serving size. However, that serving size may not be the same as the serving size used in carbohydrate counting.  Always start by checking the serving size on the label. Is this the  serving size you will be eating? How many servings are in the package?  Next, look at the total carbohydrate. It is measured in grams (g). To find the number of carbohydrate servings in 1 standard serving of a food, divide the total grams of carbohydrate by 15. One (1) carbohydrate serving is the amount of food with 15 g carbohydrate.  You don’t need to count grams of sugars. They are included in the total carbohydrate.  The label shows how many calories are in the standard serving. It also lists the amount of fat, cholesterol, sodium, protein, and some vitamins and minerals. Talk to your registered dietitian nutritionist or diabetes educator to learn about your goals for these nutrients.  Look below the line listing total fat to find out how much of that fat is saturated fat or trans fat. Choose foods that are low in these kinds of fats because they are not healthy for your heart. In the foods that are healthiest for your heart, grams of saturated fat and trans fat are less than one-third of the total fat grams.  If foods are very low in calories (less than 20 calories per serving) or carbohydrates (5 g carbohydrate or less per serving), you may not need to count them when you count carbohydrates. Ask your registered dietitian nutritionist or diabetes educator about these “free” foods.        Nutrition Counseling  Our expert team offers:   Medical Nutrition Therapy for Chronic Conditions   Weight Management   Diabetes Education and Management   Wellness Services   Body Composition Measurements   Gastrointestinal Health    [facility] are located at:  [address] [place] [id number]  For more information and to schedule a consultation, please call [phone].  A physician referral may be required by your insurance for coverage.

## 2024-09-01 NOTE — HOSPITAL COURSE
"\"38 y.o. male with a PMHx DM, HTN, GERD, polysubstance abuse, Hep C who presented 8/31/2024 with abdominal pain, nausea, and vomiting consistent with prior episodes of DKA. He was released from detention 3 days ago after 10 months and has not had his insulin. He picked up his insulin today but he already developed symptoms of DKA. In the ER his labs were consistent with DKA, he was admitted to the ICU on an insulin gtt. He endorses difficulty urinating which has occurred before in the past, denies syncope, chest pain, infectious s/s. \" - Dr. Best 8/31  "

## 2024-09-01 NOTE — CARE PLAN
The patient is Watcher - Medium risk of patient condition declining or worsening    Shift Goals  Clinical Goals: (P) Monitor blood sugar, replace electrolytes, manage DKA  Patient Goals: (P) Rest  Family Goals: (P) CORIE    Progress made toward(s) clinical / shift goals:      Problem: Fall Risk  Goal: Patient will remain free from falls  Outcome: Progressing     Problem: Pain - Standard  Goal: Alleviation of pain or a reduction in pain to the patient’s comfort goal  Outcome: Progressing     Problem: Hemodynamics  Goal: Patient's hemodynamics, fluid balance and neurologic status will be stable or improve  Outcome: Progressing     Problem: Respiratory  Goal: Patient will achieve/maintain optimum respiratory ventilation and gas exchange  Outcome: Progressing     Problem: Risk for Aspiration  Goal: Patient's risk for aspiration will be absent or decrease  Outcome: Progressing     Problem: Urinary Elimination  Goal: Establish and maintain regular urinary output  Outcome: Progressing       Patient is not progressing towards the following goals:

## 2024-09-01 NOTE — ED NOTES
Pt resting in bed, VSS on RA, GCS 15, pt in obvious discomfort, mother at bedside, pt and family aware POC to admit

## 2024-09-02 ENCOUNTER — PHARMACY VISIT (OUTPATIENT)
Dept: PHARMACY | Facility: MEDICAL CENTER | Age: 39
End: 2024-09-02
Payer: COMMERCIAL

## 2024-09-02 VITALS
HEIGHT: 64 IN | RESPIRATION RATE: 16 BRPM | BODY MASS INDEX: 22.32 KG/M2 | TEMPERATURE: 97.9 F | SYSTOLIC BLOOD PRESSURE: 128 MMHG | HEART RATE: 67 BPM | DIASTOLIC BLOOD PRESSURE: 75 MMHG | OXYGEN SATURATION: 95 % | WEIGHT: 130.73 LBS

## 2024-09-02 LAB
ALBUMIN SERPL BCP-MCNC: 3.4 G/DL (ref 3.2–4.9)
ALBUMIN/GLOB SERPL: 1.1 G/DL
ALP SERPL-CCNC: 105 U/L (ref 30–99)
ALT SERPL-CCNC: 11 U/L (ref 2–50)
AMPHET UR QL SCN: NEGATIVE
ANION GAP SERPL CALC-SCNC: 11 MMOL/L (ref 7–16)
AST SERPL-CCNC: 14 U/L (ref 12–45)
BACTERIA UR CULT: NORMAL
BARBITURATES UR QL SCN: NEGATIVE
BASOPHILS # BLD AUTO: 0.1 % (ref 0–1.8)
BASOPHILS # BLD: 0.01 K/UL (ref 0–0.12)
BENZODIAZ UR QL SCN: NEGATIVE
BILIRUB SERPL-MCNC: 1.1 MG/DL (ref 0.1–1.5)
BUN SERPL-MCNC: 17 MG/DL (ref 8–22)
BZE UR QL SCN: NEGATIVE
CALCIUM ALBUM COR SERPL-MCNC: 9.2 MG/DL (ref 8.5–10.5)
CALCIUM SERPL-MCNC: 8.7 MG/DL (ref 8.5–10.5)
CANNABINOIDS UR QL SCN: NEGATIVE
CHLORIDE SERPL-SCNC: 102 MMOL/L (ref 96–112)
CO2 SERPL-SCNC: 27 MMOL/L (ref 20–33)
CREAT SERPL-MCNC: 0.71 MG/DL (ref 0.5–1.4)
EOSINOPHIL # BLD AUTO: 0.04 K/UL (ref 0–0.51)
EOSINOPHIL NFR BLD: 0.4 % (ref 0–6.9)
ERYTHROCYTE [DISTWIDTH] IN BLOOD BY AUTOMATED COUNT: 39.7 FL (ref 35.9–50)
FENTANYL UR QL: NEGATIVE
GFR SERPLBLD CREATININE-BSD FMLA CKD-EPI: 121 ML/MIN/1.73 M 2
GLOBULIN SER CALC-MCNC: 3.1 G/DL (ref 1.9–3.5)
GLUCOSE BLD STRIP.AUTO-MCNC: 116 MG/DL (ref 65–99)
GLUCOSE BLD STRIP.AUTO-MCNC: 147 MG/DL (ref 65–99)
GLUCOSE BLD STRIP.AUTO-MCNC: 166 MG/DL (ref 65–99)
GLUCOSE BLD STRIP.AUTO-MCNC: 188 MG/DL (ref 65–99)
GLUCOSE SERPL-MCNC: 150 MG/DL (ref 65–99)
HCT VFR BLD AUTO: 36.3 % (ref 42–52)
HGB BLD-MCNC: 12.5 G/DL (ref 14–18)
IMM GRANULOCYTES # BLD AUTO: 0.03 K/UL (ref 0–0.11)
IMM GRANULOCYTES NFR BLD AUTO: 0.3 % (ref 0–0.9)
LYMPHOCYTES # BLD AUTO: 2.62 K/UL (ref 1–4.8)
LYMPHOCYTES NFR BLD: 29.3 % (ref 22–41)
MAGNESIUM SERPL-MCNC: 2.4 MG/DL (ref 1.5–2.5)
MCH RBC QN AUTO: 29.1 PG (ref 27–33)
MCHC RBC AUTO-ENTMCNC: 34.4 G/DL (ref 32.3–36.5)
MCV RBC AUTO: 84.4 FL (ref 81.4–97.8)
METHADONE UR QL SCN: NEGATIVE
MONOCYTES # BLD AUTO: 0.68 K/UL (ref 0–0.85)
MONOCYTES NFR BLD AUTO: 7.6 % (ref 0–13.4)
NEUTROPHILS # BLD AUTO: 5.55 K/UL (ref 1.82–7.42)
NEUTROPHILS NFR BLD: 62.3 % (ref 44–72)
NRBC # BLD AUTO: 0 K/UL
NRBC BLD-RTO: 0 /100 WBC (ref 0–0.2)
OPIATES UR QL SCN: NEGATIVE
OXYCODONE UR QL SCN: NEGATIVE
PCP UR QL SCN: NEGATIVE
PHOSPHATE SERPL-MCNC: 2 MG/DL (ref 2.5–4.5)
PLATELET # BLD AUTO: 333 K/UL (ref 164–446)
PMV BLD AUTO: 11.4 FL (ref 9–12.9)
POTASSIUM SERPL-SCNC: 3.9 MMOL/L (ref 3.6–5.5)
PROPOXYPH UR QL SCN: NEGATIVE
PROT SERPL-MCNC: 6.5 G/DL (ref 6–8.2)
RBC # BLD AUTO: 4.3 M/UL (ref 4.7–6.1)
SIGNIFICANT IND 70042: NORMAL
SITE SITE: NORMAL
SODIUM SERPL-SCNC: 140 MMOL/L (ref 135–145)
SOURCE SOURCE: NORMAL
WBC # BLD AUTO: 8.9 K/UL (ref 4.8–10.8)

## 2024-09-02 PROCEDURE — 82962 GLUCOSE BLOOD TEST: CPT

## 2024-09-02 PROCEDURE — A9270 NON-COVERED ITEM OR SERVICE: HCPCS | Performed by: HOSPITALIST

## 2024-09-02 PROCEDURE — 85025 COMPLETE CBC W/AUTO DIFF WBC: CPT

## 2024-09-02 PROCEDURE — 700102 HCHG RX REV CODE 250 W/ 637 OVERRIDE(OP): Performed by: HOSPITALIST

## 2024-09-02 PROCEDURE — 700102 HCHG RX REV CODE 250 W/ 637 OVERRIDE(OP): Performed by: INTERNAL MEDICINE

## 2024-09-02 PROCEDURE — RXMED WILLOW AMBULATORY MEDICATION CHARGE

## 2024-09-02 PROCEDURE — 84100 ASSAY OF PHOSPHORUS: CPT

## 2024-09-02 PROCEDURE — A9270 NON-COVERED ITEM OR SERVICE: HCPCS

## 2024-09-02 PROCEDURE — 80053 COMPREHEN METABOLIC PANEL: CPT

## 2024-09-02 PROCEDURE — 83735 ASSAY OF MAGNESIUM: CPT

## 2024-09-02 PROCEDURE — 700102 HCHG RX REV CODE 250 W/ 637 OVERRIDE(OP)

## 2024-09-02 PROCEDURE — 80307 DRUG TEST PRSMV CHEM ANLYZR: CPT

## 2024-09-02 PROCEDURE — A9270 NON-COVERED ITEM OR SERVICE: HCPCS | Performed by: INTERNAL MEDICINE

## 2024-09-02 PROCEDURE — 99239 HOSP IP/OBS DSCHRG MGMT >30: CPT | Mod: GC | Performed by: INTERNAL MEDICINE

## 2024-09-02 RX ORDER — SYRINGE-NEEDLE,INSULIN,0.5 ML 27GX1/2"
SYRINGE, EMPTY DISPOSABLE MISCELLANEOUS
Qty: 100 EACH | Refills: 0 | Status: SHIPPED | OUTPATIENT
Start: 2024-09-02

## 2024-09-02 RX ORDER — CALCIUM CARBONATE 500 MG/1
500 TABLET, CHEWABLE ORAL 2 TIMES DAILY PRN
Qty: 30 TABLET | Refills: 1 | Status: CANCELLED | OUTPATIENT
Start: 2024-09-02 | End: 2024-10-02

## 2024-09-02 RX ORDER — FAMOTIDINE 20 MG/1
20 TABLET, FILM COATED ORAL 2 TIMES DAILY
Status: DISCONTINUED | OUTPATIENT
Start: 2024-09-02 | End: 2024-09-02 | Stop reason: HOSPADM

## 2024-09-02 RX ORDER — INSULIN ASPART 100 [IU]/ML
5 INJECTION, SOLUTION INTRAVENOUS; SUBCUTANEOUS
Qty: 10 ML | Refills: 0 | Status: ACTIVE | OUTPATIENT
Start: 2024-09-02 | End: 2024-10-02

## 2024-09-02 RX ORDER — DIPHENHYDRAMINE HYDROCHLORIDE 25 MG/1
CAPSULE, LIQUID FILLED ORAL
Qty: 1 KIT | Refills: 0 | Status: SHIPPED | OUTPATIENT
Start: 2024-09-02

## 2024-09-02 RX ORDER — AVOBENZONE, HOMOSALATE, OCTISALATE, OCTOCRYLENE 30; 40; 45; 26 MG/ML; MG/ML; MG/ML; MG/ML
CREAM TOPICAL
Qty: 100 EACH | Refills: 0 | Status: SHIPPED | OUTPATIENT
Start: 2024-09-02

## 2024-09-02 RX ORDER — FAMOTIDINE 20 MG/1
20 TABLET, FILM COATED ORAL 2 TIMES DAILY
Qty: 60 TABLET | Refills: 0 | Status: CANCELLED | OUTPATIENT
Start: 2024-09-02 | End: 2024-10-02

## 2024-09-02 RX ADMIN — LIDOCAINE HYDROCHLORIDE 30 ML: 20 SOLUTION ORAL; TOPICAL at 16:03

## 2024-09-02 RX ADMIN — INSULIN HUMAN 3 UNITS: 100 INJECTION, SOLUTION PARENTERAL at 13:39

## 2024-09-02 RX ADMIN — INSULIN GLARGINE-YFGN 22 UNITS: 100 INJECTION, SOLUTION SUBCUTANEOUS at 05:30

## 2024-09-02 RX ADMIN — TAMSULOSIN HYDROCHLORIDE 0.4 MG: 0.4 CAPSULE ORAL at 10:17

## 2024-09-02 RX ADMIN — ANTACID TABLETS 500 MG: 500 TABLET, CHEWABLE ORAL at 10:17

## 2024-09-02 RX ADMIN — LISINOPRIL 5 MG: 5 TABLET ORAL at 05:30

## 2024-09-02 RX ADMIN — FAMOTIDINE 20 MG: 20 TABLET, FILM COATED ORAL at 10:17

## 2024-09-02 RX ADMIN — SENNOSIDES AND DOCUSATE SODIUM 2 TABLET: 50; 8.6 TABLET ORAL at 05:30

## 2024-09-02 RX ADMIN — OMEPRAZOLE 20 MG: 20 CAPSULE, DELAYED RELEASE ORAL at 05:30

## 2024-09-02 RX ADMIN — LIDOCAINE HYDROCHLORIDE 30 ML: 20 SOLUTION ORAL; TOPICAL at 08:15

## 2024-09-02 ASSESSMENT — PAIN DESCRIPTION - PAIN TYPE: TYPE: ACUTE PAIN

## 2024-09-02 NOTE — CARE PLAN
The patient is Stable - Low risk of patient condition declining or worsening    Shift Goals  Clinical Goals: monitor and manage BG, UA sample,  Patient Goals: Rest  Family Goals: CORIE    Progress made toward(s) clinical / shift goals:    Problem: Knowledge Deficit - Standard  Goal: Patient and family/care givers will demonstrate understanding of plan of care, disease process/condition, diagnostic tests and medications  Description: Target End Date:  1-3 days or as soon as patient condition allows    Document in Patient Education    1.  Patient and family/caregiver oriented to unit, equipment, visitation policy and means for communicating concern  2.  Complete/review Learning Assessment  3.  Assess knowledge level of disease process/condition, treatment plan, diagnostic tests and medications  4.  Explain disease process/condition, treatment plan, diagnostic tests and medications  Outcome: Progressing  Note: Patient updated on plan of care, including prescribed medication regimen, continued monitoring of blood glucose, and possible discharge home     Problem: Urinary Elimination  Goal: Establish and maintain regular urinary output  Description: Target End Date:  Prior to discharge or change in level of care    Document on I/O and Assessment flowsheets    1.  Evaluate need to continue indwelling catheter every shift  2.  Assess signs and symptoms of urinary retention  3.  Assess post-void residual volumes  4.  Implement bladder training program  5.  Encourage scheduled voidings  6.  Assist patient to sit on bedside commode or toilet for voiding  7.  Educate patient and family/caregiver on use and purpose of urine collection devices (document in Patient Education)  Outcome: Progressing  Note: Patient urinating into toilet throughout the shift without difficulty.   Patient educated on urine collection needed and urinal provided.        Patient is not progressing towards the following goals:

## 2024-09-02 NOTE — DISCHARGE SUMMARY
"[facility] Internal Medicine Discharge Summary    Attending: Garrett Tovar M.d.  Senior Resident: Dr. Ramos  Intern:  Dr. Rao  Contact Number: [phone]    CHIEF COMPLAINT ON ADMISSION  Chief Complaint   Patient presents with    Blood in Urine     Since last night. Denies pain with urination. Reports frequency. Patient states \"since this morning, It's hard to pee.\" Last urination last night.     Abdominal Pain     Abd pain/N/V x 2 days. Type 1 DM - last use of insulin 3 days ago in assisted. FSBS in triage 599.      Reason for Admission  Blood in urine     Admission Date  8/31/2024    CODE STATUS  Full Code    HPI & HOSPITAL COURSE  38 y.o. male with PMHx HTN, T1DM (Hgb A1c 10.1%), GERD, polysubstance abuse, hepatitis C who presented 8/31/2024 with abdominal pain, nausea, vomiting, diagnosed with DKA on admission on 8/31/24. He was released from assisted 3 days ago after a 10 month stay and did not had had a chance to get his insulin. He had just went to  his prescriptions. The patient was admitted to the ICU for DKA and was transferred to the floor on 9/1/24 after DKA resolved with protocol and IVF resuscitation.     He did have urinary retention during hospital stay which resolved prior to discharge. Flomax was discontinued due to patient not requiring it anymore. Hgb stable and UA negative blood and no evidence of infection.    He was transitioned to glargine 22u and SSI. He was tolerating diabetic diet without difficulty. He was educated by nursing staff and also DM educator about how to use his insulin and correctly administer it. Prior to discharge, he was able to administer his own insulin and track his blood sugars. He did not need a refill of his glargine on discharge. He had insulin aspart delivered to his bedside prior to discharge.     He will follow up with his PCP for further management of his diabetes.     Therefore, he is discharged in fair and stable condition to home with close outpatient " follow-up.    The patient met 2-midnight criteria for an inpatient stay at the time of discharge.    Discharge Date  9/2/24    Physical Exam on Day of Discharge  Physical Exam  Constitutional:       General: He is not in acute distress.     Appearance: He is not ill-appearing, toxic-appearing or diaphoretic.   HENT:      Head: Normocephalic and atraumatic.      Nose: Nose normal. No rhinorrhea.      Mouth/Throat:      Mouth: Mucous membranes are moist.      Pharynx: Oropharynx is clear.   Eyes:      Extraocular Movements: Extraocular movements intact.      Conjunctiva/sclera: Conjunctivae normal.   Cardiovascular:      Rate and Rhythm: Normal rate and regular rhythm.   Pulmonary:      Effort: Pulmonary effort is normal. No respiratory distress.      Breath sounds: No wheezing.   Abdominal:      General: There is no distension.      Palpations: Abdomen is soft.      Tenderness: There is no abdominal tenderness. There is no guarding or rebound.   Musculoskeletal:      Cervical back: Normal range of motion and neck supple.      Right lower leg: No edema.      Left lower leg: No edema.   Neurological:      General: No focal deficit present.      Mental Status: He is alert and oriented to person, place, and time.   Psychiatric:         Mood and Affect: Mood normal.         Behavior: Behavior normal.       FOLLOW UP ITEMS POST DISCHARGE  Follow up with PCP regarding diabetes management    DISCHARGE DIAGNOSES  Principal Problem:    DKA, type 1, not at goal (HCC) (POA: Yes)  Active Problems:    Urinary retention (POA: Yes)      Overview: IMO load March 2020    Gastroesophageal reflux disease without esophagitis (POA: Yes)    Hypertension (POA: Yes)    Electrolyte abnormality (POA: Yes)    Acute kidney injury (nontraumatic) (HCC) (POA: Unknown)  Resolved Problems:    * No resolved hospital problems. *    FOLLOW UP  Referral has been placed to establish care with PCP    MEDICATIONS ON DISCHARGE     Medication List         START taking these medications        Instructions   NovoLOG 100 UNIT/ML Soln  Generic drug: insulin aspart   Inject 5 Units under the skin 3 times a day before meals for 30 days. 70   - 150  mg/dL =      0 Units  151 - 200  mg/dL =    3 Units  201 - 250  mg/dL =    4 Units  251 - 300  mg/dL  =   7 Units  301 - 350  mg/dL  =   10 Units  351 - 400 mg/dL   =   12 Units  Over 400 mg/dL   =   14 Units  Dose: 5 Units     phosphorus 250 MG tablet  Commonly known as: K-Phos-Neutral   Take 1 Tablet by mouth 2 times a day for 1 day.  Dose: 1 Tablet            CONTINUE taking these medications        Instructions   Alcohol Swabs Pads   Doctor's comments: Per formulary preference. ICD-10 code: E10.65 - Uncontrolled type 1 Diabetes Mellitus  Wipe site with prep pad prior to injection.     BD Pen Needle Sally U/F  Generic drug: Insulin Pen Needle 32 G x 4 mm   Doctor's comments: Per formulary preference. ICD-10 code: E10.65 - Uncontrolled type 1 Diabetes Mellitus  Use for injections 3 times daily     Lantus SoloStar 100 UNIT/ML Sopn injection  Generic drug: insulin glargine   Inject 22 Units under the skin every morning.  Dose: 22 Units     lisinopril 5 MG Tabs  Commonly known as: Prinivil   Take 1 Tablet by mouth every day.  Dose: 5 mg     omeprazole 20 MG Tbec delayed-release tablet  Commonly known as: PriLOSEC   Take 1 Tablet by mouth every day.  Dose: 20 mg     TechLite Lancets Misc   Doctor's comments: Or per formulary preference. ICD-10 code: E10.65 - Uncontrolled type 1 Diabetes Mellitus  Use to test 3 times daily     True Metrix Meter w/Device Kit   Doctor's comments: Or per formulary preference. ICD-10 code: E10.65 - Uncontrolled type 1 Diabetes Mellitus  Test as directed            STOP taking these medications      insulin lispro 100 UNIT/ML Sopn injection PEN  Commonly known as: HumaLOG/AdmeLOG     True Metrix Blood Glucose Test strip  Generic drug: glucose blood            Allergies  No Known  Allergies    DIET  Orders Placed This Encounter   Procedures    Diet Order Diet: Consistent CHO (Diabetic)     Standing Status:   Standing     Number of Occurrences:   1     Order Specific Question:   Diet:     Answer:   Consistent CHO (Diabetic) [4]     ACTIVITY  As tolerated.  Weight bearing as tolerated    CONSULTATIONS  Critical care    PROCEDURES  None    LABORATORY  Lab Results   Component Value Date    SODIUM 140 09/02/2024    POTASSIUM 3.9 09/02/2024    CHLORIDE 102 09/02/2024    CO2 27 09/02/2024    GLUCOSE 150 (H) 09/02/2024    BUN 17 09/02/2024    CREATININE 0.71 09/02/2024      Lab Results   Component Value Date    WBC 8.9 09/02/2024    HEMOGLOBIN 12.5 (L) 09/02/2024    HEMATOCRIT 36.3 (L) 09/02/2024    PLATELETCT 333 09/02/2024      Total time of the discharge process exceeds 40 minutes.

## 2024-09-02 NOTE — PROGRESS NOTES
..4 Eyes Skin Assessment Completed by Kanchan RN and ANANYA Fortune.    Head WDL tattoo  Ears WDL  Nose WDL  Mouth WDL  Neck WDL tattoo  Breast/Chest WDL tattoo  Shoulder Blades WDL tattoo  Spine WDL *Dryness on spine and lower back from recent tattoo  (R) Arm/Elbow/Hand WDL tattoo  (L) Arm/Elbow/Hand WDL tattoo  Abdomen WDL  Groin WDL  Scrotum/Coccyx/Buttocks WDL  (R) Leg WDL  (L) Leg WDL  (R) Heel/Foot/Toe Redness and Blanching  (L) Heel/Foot/Toe Redness and Blanching          Devices In Places None      Interventions In Place Pillows    Possible Skin Injury No    Pictures Uploaded Into Epic N/A  Wound Consult Placed N/A  RN Wound Prevention Protocol Ordered No

## 2024-09-02 NOTE — DISCHARGE INSTRUCTIONS
Continue over the counter prilosec and tums as previously recommended.         Diabetic Ketoacidosis  Diabetic ketoacidosis (DKA) is a serious complication of diabetes. This condition develops when there is not enough insulin in the body. Insulin is a hormone that regulates blood sugar (glucose) levels in the body. Normally, insulin allows glucose to enter the cells in the body. The cells break down glucose for energy. Without enough insulin, the body cannot break down glucose and breaks down fats instead. This leads to high blood glucose levels in the body. It also leads to the production of acids that are called ketones. Ketones are poisonous at high levels.  If DKA is not treated, it can cause severe dehydration and can lead to a coma or death.  What are the causes?  This condition develops when a lack of insulin causes the body to break down fats instead of glucose. This may be triggered by:  Stress on the body. This stress can be brought on by an illness.  Infection.  Medicines that raise blood glucose levels.  Not taking or skipping doses of diabetes medicines.  New onset of type 1 diabetes mellitus.  Missing insulin on purpose or by accident.  Interruption of insulin through an insulin pump. This can happen if the cannula that connects you to the insulin pump gets dislodged or kinked.  What are the signs or symptoms?  Symptoms of this condition include:  Early symptoms may include:  Excessive thirst or dry mouth or excessive urination.  More severe symptoms may include:  Abdominal pain, nausea, or vomiting.  Vision changes.  Fruity or sweet-smelling breath.  Irritability or confusion.  Rapid breathing.  Signs shown by testing include:  High blood glucose.  High levels of ketones in the body.  How is this diagnosed?  This condition is diagnosed based on your medical history, a physical exam, and blood tests. You may also have a urine test to check for ketones.  How is this treated?  This condition may be  treated with:  Fluid replacement. This may be done with IV fluids to correct dehydration.  Correcting high blood glucose with insulin. This may be given through the skin as injections or through an IV.  Electrolyte replacement. Electrolytes are minerals in your blood. Electrolytes such as potassium and sodium may be given in pill form or through an IV.  Antibiotic medicines. These may be prescribed if your condition was caused by an infection.  DKA is a serious medical condition. You may need emergency treatment in the hospital so that you can be monitored closely.  Follow these instructions at home:  Medicines  Take over-the-counter and prescription medicines only as told by your health care provider.  Continue to take insulin and other diabetes medicines as told by your health care provider.  If you were prescribed an antibiotic medicine, take it as told by your health care provider. Do not stop using the antibiotic even if you start to feel better.  Eating and drinking    Drink enough fluid to keep your urine pale yellow.  If you are able to eat, follow your usual diet and drink sugar-free liquids, such as water, tea, and sugar-free soft drinks. You can also have sugar-free gelatin or ice pops.  If you are not able to eat, drink liquids that contain sugar in small amounts as you are able. Liquids include fruit juice, regular soft drinks, and sherbet.  Checking ketones and blood glucose    Check your urine for ketones when you are ill and as told by your health care provider.  If your blood glucose is 240 mg/dL (13.3 mmol/L) or higher, check your urine ketones every 4 hours. If you have moderate or large ketones, call your health care provider.  To check your ketone levels follow these steps:  Collect urine in a small cup.  Dip a test strip in the urine.  Wait for it to change color.  Compare the strip to the color chart that comes with the test kit.  Check your blood glucose every day, and as often as told by  your health care provider.  If your blood glucose is high, drink plenty of fluids. This helps flush out ketones.  If your blood glucose is above your target for two tests in a row, contact your health care provider.  General instructions  Carry a medical alert card or wear medical alert jewelry that shows that you have diabetes.  Do exercises as told by your health care provider. Do not exercise when your blood glucose is high and you have ketones in your urine.  If you get sick, call your health care provider and begin treatment quickly. Your body often needs extra insulin to fight an illness. Check your blood glucose every 4 hours when you are sick.  Keep all follow-up visits. This is important.  Where to find more information  For more information, guidance, and advice, look online here:  American Diabetes Association: diabetes.org  Association of Diabetes Care & Education Specialists: diabeteseducator.org  Contact a health care provider if:  Your blood glucose level is higher than 240 mg/dL (13.3 mmol/L) for 2 days in a row.  You have moderate or large ketones in your urine.  You have a fever.  You cannot eat or drink without vomiting.  You have been vomiting for more than 2 hours.  You continue to have symptoms of DKA.  You develop new symptoms.  Get help right away if:  Your blood glucose monitor reads high even when you are taking insulin.  You faint.  You have chest pain or you have trouble breathing.  You have sudden trouble speaking or swallowing.  You have vomiting or diarrhea that gets worse after 3 hours.  You are unable to stay awake or you have trouble thinking.  You are severely dehydrated. Symptoms of severe dehydration include:  Extreme thirst.  Dry mouth.  Rapid breathing.  These symptoms may represent a serious problem that is an emergency. Do not wait to see if the symptoms will go away. Get medical help right away. Call your local emergency services (911 in the [place] Do not drive yourself to  the hospital.  Summary  DKA is a serious complication of diabetes. This condition develops when there is not enough insulin in the body.  This condition is diagnosed based on your medical history, a physical exam, and blood tests. You may also have a urine test to check for ketones.  DKA is a serious medical condition. You may need emergency treatment in the hospital to monitor your condition.  Contact your health care provider if your blood glucose is higher than 240 mg/dL for 2 days in a row or if you have moderate or large ketones in your urine.  This information is not intended to replace advice given to you by your health care provider. Make sure you discuss any questions you have with your health care provider.  Document Revised: 12/28/2021 Document Reviewed: 10/20/2021  [employer] Patient Education © 2023 [employer]

## 2024-09-02 NOTE — PROGRESS NOTES
Assumed care of patient this morning. A&Ox4, VSS, refused bed alarm, up self and continent x 2. Complaints of heartburn, hx of same. Given GI cocktail. Pending USD and DM edu.

## 2024-09-02 NOTE — PROGRESS NOTES
Patient able to demonstrate taking own blood sugar as well as drawing up and administering insulin by self accurately.